# Patient Record
Sex: FEMALE | Race: WHITE | NOT HISPANIC OR LATINO | Employment: OTHER | URBAN - NONMETROPOLITAN AREA
[De-identification: names, ages, dates, MRNs, and addresses within clinical notes are randomized per-mention and may not be internally consistent; named-entity substitution may affect disease eponyms.]

---

## 2017-05-18 ENCOUNTER — OFFICE VISIT (OUTPATIENT)
Dept: ORTHOPEDIC SURGERY | Facility: CLINIC | Age: 72
End: 2017-05-18

## 2017-05-18 DIAGNOSIS — S43.005A SHOULDER JOINT DISLOCATION, LEFT, INITIAL ENCOUNTER: Primary | ICD-10-CM

## 2017-05-18 PROCEDURE — 99213 OFFICE O/P EST LOW 20 MIN: CPT | Performed by: ORTHOPAEDIC SURGERY

## 2017-05-23 ENCOUNTER — OFFICE VISIT (OUTPATIENT)
Dept: ORTHOPEDIC SURGERY | Facility: CLINIC | Age: 72
End: 2017-05-23

## 2017-05-23 DIAGNOSIS — S43.005D: Primary | ICD-10-CM

## 2017-05-23 PROBLEM — S43.006A SHOULDER JOINT DISLOCATION: Status: ACTIVE | Noted: 2017-05-23

## 2017-05-23 PROCEDURE — 99213 OFFICE O/P EST LOW 20 MIN: CPT | Performed by: ORTHOPAEDIC SURGERY

## 2017-06-15 ENCOUNTER — OFFICE VISIT (OUTPATIENT)
Dept: ORTHOPEDIC SURGERY | Facility: CLINIC | Age: 72
End: 2017-06-15

## 2017-06-15 DIAGNOSIS — S43.005D: Primary | ICD-10-CM

## 2017-06-15 PROCEDURE — 99213 OFFICE O/P EST LOW 20 MIN: CPT | Performed by: ORTHOPAEDIC SURGERY

## 2017-06-15 RX ORDER — LORAZEPAM 1 MG/1
1 TABLET ORAL NIGHTLY PRN
COMMUNITY
Start: 2016-03-25 | End: 2021-11-17

## 2017-06-15 RX ORDER — ATENOLOL 25 MG/1
31.25 TABLET ORAL DAILY
COMMUNITY
Start: 2017-05-14 | End: 2021-11-03 | Stop reason: SDUPTHER

## 2017-06-15 RX ORDER — ESCITALOPRAM OXALATE 10 MG/1
5 TABLET ORAL DAILY
COMMUNITY
Start: 2016-04-13 | End: 2021-07-17 | Stop reason: SDUPTHER

## 2017-06-15 RX ORDER — PILOCARPINE HYDROCHLORIDE 10 MG/ML
SOLUTION/ DROPS OPHTHALMIC
COMMUNITY
Start: 2017-05-02

## 2017-06-15 RX ORDER — ATENOLOL 50 MG/1
TABLET ORAL
COMMUNITY
Start: 2016-03-19 | End: 2021-08-03

## 2017-06-15 RX ORDER — BACITRACIN 500 [USP'U]/G
OINTMENT OPHTHALMIC
COMMUNITY
Start: 2016-09-22 | End: 2021-11-03

## 2017-06-15 NOTE — PROGRESS NOTES
Chief Complaint   Patient presents with   • Left Shoulder - Follow-up   Patient is here to follow up on a left shoulder dislocation that caused some tearing on May 15,2017.        HPI patient is following up on a shoulder dislocation which led to a massive rotator cuff tear.  She had an MRI which is consistent with a large tear of the supraspinatus.  She is very much set against surgery and does not want to have a surgical intervention at this point.  I certainly agree with conservative, nonoperative management for as long as she can tolerate her symptoms.  If however she starts to develop cuff tear arthropathy she does understand that she is going to need a shoulder joint replacement.  I have discussed that with the patient as well as her  in great detail.  She is making good progress with physical therapy and does not have any night or rest pain at this point.        There were no vitals filed for this visit.        Review of Systems   Constitutional: Negative for activity change, appetite change, fatigue, fever and unexpected weight change.   HENT: Negative for congestion, sneezing and voice change.    Eyes: Negative for visual disturbance.   Respiratory: Negative for cough, chest tightness and wheezing.    Cardiovascular: Negative for chest pain, palpitations and leg swelling.   Gastrointestinal: Negative for abdominal distention, constipation, diarrhea and vomiting.   Endocrine: Negative for polydipsia, polyphagia and polyuria.   Genitourinary: Negative for decreased urine volume, difficulty urinating, dysuria, flank pain and frequency.   Musculoskeletal: Negative for arthralgias, back pain, gait problem, joint swelling, myalgias, neck pain and neck stiffness.   Skin: Negative for color change, pallor and wound.   Allergic/Immunologic: Negative for environmental allergies and food allergies.   Neurological: Negative for dizziness, weakness and headaches.   Hematological: Does not bruise/bleed easily.    Psychiatric/Behavioral: Negative for agitation, confusion, decreased concentration and sleep disturbance. The patient is not nervous/anxious.            Physical Exam   Constitutional: She is oriented to person, place, and time. She appears well-developed and well-nourished.   HENT:   Head: Normocephalic.   Eyes: Conjunctivae are normal. Pupils are equal, round, and reactive to light.   Neck: Normal range of motion. Neck supple. No JVD present.   Cardiovascular: Normal rate, normal heart sounds and intact distal pulses.    Pulmonary/Chest: Effort normal and breath sounds normal. No respiratory distress.   Abdominal: Soft. Bowel sounds are normal. There is no tenderness. There is no guarding.   Lymphadenopathy:     She has no cervical adenopathy.   Neurological: She is alert and oriented to person, place, and time. She has normal reflexes.   Skin: Skin is warm and dry.   Psychiatric: Her behavior is normal. Judgment and thought content normal.   Vitals reviewed.          Joint/Body Part Specific Exam:    left shoulder. The shoulder is extremely tender anteriorly. There is tenderness over the insertion of the rotator cuff over the greater tuberosity of the humerus. Slight proximal migration of the humeral head is noted. AC joint is tender. Crossover adduction test is positive. Drop arm sign is positive. Forward flexion is 0-160° degrees, abduction is 0-165° degrees, external rotation is 0-30° degrees. Patient has mild atrophy both of the supra and infraspinatus fossa. Sulcus sign is negative. There is no evidence of multidirectional instability. Neer test is positive on compression. Skin and soft tissue tenderness is noted. Patient’s strength in abduction and external rotation are extremely lacking. The pain level is 2.    X-RAY Report:  No x-rays taken at this visit      Diagnostics:        Yanni was seen today for follow-up.    Diagnoses and all orders for this visit:    Shoulder joint dislocation, left,  subsequent encounter          Procedures        Plan:  Continue with outpatient physical therapy with stretching and strengthening exercises of the shoulder to prevent a frozen shoulder.    Redislocation precautions and avoid the provocative position for shoulder dislocation.    Tablet ibuprofen 600 mg tab 1 by mouth daily when necessary pain and discomfort.    Patient will eventually need a reverse total shoulder arthroplasty think that her shoulder function especially the rotator cuff function is in a recliner situation and will probably get worse as time goes by.    Follow-up in my office in 8 weeks care

## 2017-08-14 ENCOUNTER — OFFICE VISIT (OUTPATIENT)
Dept: ORTHOPEDIC SURGERY | Facility: CLINIC | Age: 72
End: 2017-08-14

## 2017-08-14 DIAGNOSIS — S43.005D: Primary | ICD-10-CM

## 2017-08-14 PROCEDURE — 99213 OFFICE O/P EST LOW 20 MIN: CPT | Performed by: ORTHOPAEDIC SURGERY

## 2017-08-14 NOTE — PROGRESS NOTES
Chief Complaint   Patient presents with   • Left Shoulder - Follow-up     CC: Follow up on her left shoulder dislocation with a massive rotator cuff tear      HPI the patient is here today for a follow up of her left shoulder. Patient states that she is doing quite well at this point.  The pain, swelling and bruising is settled down nicely.  Physical therapy has helped her immensely.  She has regained her near normal range of motion.  She has some discomfort in forward flexion and abduction but for the most part she is not taking any medication to control the symptoms.  Her symptoms are very tolerable.  At this point the patient is very convinced that she does not want to consider any form of surgical intervention.  She does understand the pathology of a shoulder dislocation that can lead to a massive rotator cuff tear and eventual cuff tear arthropathy         There were no vitals filed for this visit.        Review of Systems   Constitutional: Negative.    HENT: Negative.    Eyes: Negative.    Respiratory: Negative.    Cardiovascular: Negative.    Gastrointestinal: Negative.    Endocrine: Negative.    Genitourinary: Negative.    Musculoskeletal: Negative.    Skin: Negative.    Allergic/Immunologic: Negative.    Neurological: Negative.    Hematological: Negative.    Psychiatric/Behavioral: Negative.            Physical Exam   Constitutional: She is oriented to person, place, and time. She appears well-nourished.   HENT:   Head: Atraumatic.   Eyes: EOM are normal.   Neck: Neck supple.   Cardiovascular: Normal rate and intact distal pulses.    Pulmonary/Chest: Breath sounds normal.   Abdominal: Soft. Bowel sounds are normal.   Musculoskeletal: Normal range of motion. She exhibits edema and tenderness. She exhibits no deformity.   Neurological: She is alert and oriented to person, place, and time. She has normal reflexes.   Skin: Skin is dry.   Psychiatric: She has a normal mood and affect. Her behavior is normal.  Judgment and thought content normal.   Nursing note and vitals reviewed.          Joint/Body Part Specific Exam:  left shoulder. Rotator cuff function is somewhat impaired. There is a high riding humeral head with articulation of the humerus to the undersurface of the acromiohumeral articulation. AC joint is exquisitely tender and painful for patient. Axillary nerve function is well preserved. There is significant winging of the scapula with hollowing of the fossae over the proximal and distal aspects of the spine of the scapula. Forward flexion is 0-130 degrees, active abduction is 0-130 degrees. Drop arm sign is positive. External rotation against resistance is extremely painful and limited for the patient. Skin and soft tissues are essentially normal other than some amounts of swelling. The pain level is 5.  Apprehension sign is positive although she has had no further episodes of shoulder dislocation since the index injury.        X-RAY Report:        Diagnostics:        Yanni was seen today for follow-up.    Diagnoses and all orders for this visit:    Shoulder joint dislocation, left, subsequent encounter          Procedures        Plan:  Continue with a home based exercise program with stretching and strengthening exercises.    Re-dislocation precautions.    Continue with outpatient physical therapy 1-2 times a week.    Tablet ibuprofen 600 mg tab 1 by mouth twice a day p.m. pain and discomfort.    Intra-articular steroid injection discussed and offered to the patient if needed for control of pain and symptoms on a temporary basis.    Eventually this patient is going to need a reverse total shoulder arthroplasty in the future as the arthritis progresses and atrophy of the rotator cuff tear continues to bother her.    Calcium and vitamin D for bone health.    Follow-up in my office in 3-4 months for reevaluation and repeat x-rays.

## 2017-11-16 ENCOUNTER — OFFICE VISIT (OUTPATIENT)
Dept: ORTHOPEDIC SURGERY | Facility: CLINIC | Age: 72
End: 2017-11-16

## 2017-11-16 DIAGNOSIS — S43.005D DISLOCATION OF LEFT SHOULDER JOINT, SUBSEQUENT ENCOUNTER: ICD-10-CM

## 2017-11-16 DIAGNOSIS — M25.522 LEFT ELBOW PAIN: Primary | ICD-10-CM

## 2017-11-16 DIAGNOSIS — S43.005S SHOULDER DISLOCATION, LEFT, SEQUELA: ICD-10-CM

## 2017-11-16 PROCEDURE — 99213 OFFICE O/P EST LOW 20 MIN: CPT | Performed by: ORTHOPAEDIC SURGERY

## 2017-11-16 PROCEDURE — 73030 X-RAY EXAM OF SHOULDER: CPT | Performed by: ORTHOPAEDIC SURGERY

## 2017-11-16 PROCEDURE — 73070 X-RAY EXAM OF ELBOW: CPT | Performed by: ORTHOPAEDIC SURGERY

## 2017-11-16 NOTE — PROGRESS NOTES
Chief Complaint   Patient presents with   • Left Shoulder - Follow-up   • Left Elbow - Follow-up           HPI the patient is here today for a follow up of her left shoulder and left elbow.Patient sustained an injury to the left upper extremity when she fell and dislocated the glenohumeral joint.  A closed reduction is performed at the emergency room.  This has been followed by conservative, nonoperative care.  An MRI has been obtained which revealed a massive tear of the rotator cuff involving the supra and infraspinatus tendons.  She has been adamant in not wanting to undergo surgery and therefore has been working hard with physical therapy.  She has recovered range of motion to near normal levels of the shoulder.  She is very functional at this point.  There have been no further episodes of dislocation.  The patient states that she would like to as long as possible without surgical intervention.  The left elbow continues to bother her on the medial aspect of the elbow.  She has difficulty with full extension.  She finds it difficult to lift heavy weight with the left upper extremity because of the elbow pain.  We have discussed the pathology of medial epicondylar injury patient and have recommended conservative, nonoperative care.    There were no vitals filed for this visit.    Review of Systems   Constitutional: Negative.    HENT: Negative.    Eyes: Negative.    Respiratory: Negative.    Cardiovascular: Negative.    Gastrointestinal: Negative.    Endocrine: Negative.    Genitourinary: Negative.    Musculoskeletal: Negative.    Skin: Negative.    Allergic/Immunologic: Negative.    Neurological: Negative.    Hematological: Negative.    Psychiatric/Behavioral: Negative.            Physical Exam   Constitutional: She is oriented to person, place, and time. She appears well-nourished.   HENT:   Head: Atraumatic.   Eyes: EOM are normal.   Neck: Neck supple.   Cardiovascular: Normal rate and intact distal pulses.     Pulmonary/Chest: Breath sounds normal.   Abdominal: Bowel sounds are normal.   Musculoskeletal: She exhibits edema and tenderness. She exhibits no deformity.   Neurological: She is alert and oriented to person, place, and time. She has normal reflexes.   Skin: Skin is dry.   Psychiatric: She has a normal mood and affect. Her behavior is normal. Judgment and thought content normal.   Nursing note and vitals reviewed.          Joint/Body Part Specific Exam:  left elbow. medial Relationship of 3 bony points is well preserved. There is no evidence of posterior interosseous nerve palsy. Mild effusion is noted of the elbow. There is no ulnar neuritis. Patient has a lot of pain and tenderness over the lateral/medial aspect of the elbow. Range of motion of the elbow is 0-130 degrees of flexion, 0-90 degrees of pronation, 0-90 degrees of supination. Extension of the wrist against resistance bothers the patient significantly. Radial artery pulses are palpable. Skin and soft tissues are slightly swollen. There is point tenderness over the flexor pronator muscle mass/extensor supinator muscle mass.    left shoulder. Rotator cuff function is extremely impaired. There is a high riding humeral head with articulation of the humerus to the undersurface of the acromiohumeral articulation. AC joint is exquisitely tender and painful for patient. Axillary nerve function is well preserved. There is significant winging of the scapula with hollowing of the fossae over the proximal and distal aspects of the spine of the scapula. Forward flexion is 0-130 degrees, active abduction is 0-150 degrees. Drop arm sign is positive. External rotation against resistance is extremely painful and limited for the patient. Skin and soft tissues are essentially normal other than some amounts of swelling. The pain level is 3    X-RAY Report:  left Elbow X-Ray  Indication: Pain on full extension of the elbow  Views: AP and Lateral views  Findings: Fairly  normal contour of the bony architecture of the elbow   no bony lesion  Soft tissues within normal limits  within normal limits joint spaces  Hardware appropriately positioned not applicable      no prior studies available for comparison.    X-RAY was ordered and reviewed by Darshan Dela Cruz MD        left Shoulder X-Ray  Indication: Evaluation of the shoulder joint reduction status post closed reduction of a dislocation  AP and Lateral  Findings: Anatomically reduced joint with slight proximal migration of the humeral head.  no bony lesion  Soft tissues within normal limits  decreased joint spaces  Hardware appropriately positioned not applicable      yes prior studies available for comparison.    X-RAY was ordered and reviewed by Darshan Dela Cruz MD          Diagnostics:        Yanni was seen today for follow-up and follow-up.    Diagnoses and all orders for this visit:    Left elbow pain  -     XR Elbow 2 View Left    Shoulder dislocation, left, sequela  -     XR Shoulder 2+ View Left    Dislocation of left shoulder joint, subsequent encounter          Procedures        Plan:  Continue with a home based exercise program with stretching and strengthening exercises of the rotator cuff musculature.    Tablet ibuprofen 600 mg orally twice a day for pain and discomfort.    Redislocation precautions.    Topical application of Voltaren Gel to act as an anti-inflammatory salve to help decrease the swelling and inflammation around the elbow and the shoulder joint as well.    Intra-articular injection to the medial epicondylar tendons as well as to the shoulder discussed with the patient.    Eventually she will need a reverse total shoulder arthroplasty but the patient will like to proceed with conservative, nonoperative care for as long as possible.    Follow-up in my office in 3 months.

## 2018-02-22 ENCOUNTER — OFFICE VISIT (OUTPATIENT)
Dept: ORTHOPEDIC SURGERY | Facility: CLINIC | Age: 73
End: 2018-02-22

## 2018-02-22 DIAGNOSIS — M25.522 LEFT ELBOW PAIN: ICD-10-CM

## 2018-02-22 DIAGNOSIS — G56.22 ULNAR NEURITIS, LEFT: ICD-10-CM

## 2018-02-22 DIAGNOSIS — S43.005D DISLOCATION OF LEFT SHOULDER JOINT, SUBSEQUENT ENCOUNTER: ICD-10-CM

## 2018-02-22 DIAGNOSIS — S43.005S: Primary | ICD-10-CM

## 2018-02-22 PROCEDURE — 73020 X-RAY EXAM OF SHOULDER: CPT | Performed by: ORTHOPAEDIC SURGERY

## 2018-02-22 PROCEDURE — 99213 OFFICE O/P EST LOW 20 MIN: CPT | Performed by: ORTHOPAEDIC SURGERY

## 2018-02-22 PROCEDURE — 73070 X-RAY EXAM OF ELBOW: CPT | Performed by: ORTHOPAEDIC SURGERY

## 2018-02-22 NOTE — PROGRESS NOTES
Chief Complaint   Patient presents with   • Left Shoulder - Follow-up   • Shoulder Injury     LEFT SHOULDER DISLOCATION   Patient is here today following up on a left shoulder dislocation.        HPI patient has done quite well in terms of return of range of motion of the shoulder following a dislocation.  The rotator cuff function is impaired because of the tear.  She is most likely going to advance into a situation with cuff tear arthropathy.  However at this point it is her elbow that is bothering her the most.  She has pain and discomfort which started during the physical therapy process.  The pain is over the medial aspect of the elbow.  Extension against resistance bothers the patient significantly.  She has pain along the ulnar nerve which radiates along the ulnar side forearm into the fourth and fifth digits.  She has some weakness in  strength as well.  There is no history of recurrent dislocation of the shoulder.  It is my impression that she injured the medial side of the elbow over the epicondylar tendons as well as the ulnar nerve which is now getting aggravated as she is actively mobilize in the shoulder and the upper extremity as a whole.  If her symptoms continue to bother her she might be a candidate for an EMG/nerve conduction study for evaluation of the ulnar nerve involvement.        There were no vitals filed for this visit.        Review of Systems   Constitutional: Negative.    HENT: Negative.    Eyes: Negative.    Respiratory: Negative.    Cardiovascular: Negative.    Gastrointestinal: Negative.    Endocrine: Negative.    Genitourinary: Negative.    Musculoskeletal: Positive for joint swelling.   Skin: Negative.    Allergic/Immunologic: Negative.    Neurological: Negative.    Hematological: Negative.    Psychiatric/Behavioral: Negative.            Physical Exam   Constitutional: She is oriented to person, place, and time. She appears well-nourished.   HENT:   Head: Atraumatic.   Eyes: EOM  are normal.   Neck: Neck supple.   Cardiovascular: Normal rate, regular rhythm, normal heart sounds and intact distal pulses.    Pulmonary/Chest: Breath sounds normal.   Abdominal: Bowel sounds are normal.   Musculoskeletal: She exhibits edema and tenderness. She exhibits no deformity.   Neurological: She is alert and oriented to person, place, and time. She has normal reflexes.   Skin: Skin is dry.   Psychiatric: She has a normal mood and affect. Her behavior is normal. Judgment and thought content normal.   Nursing note and vitals reviewed.          Joint/Body Part Specific Exam:  left shoulder. Rotator cuff function is extremely impaired. There is a high riding humeral head with articulation of the humerus to the undersurface of the acromiohumeral articulation. AC joint is exquisitely tender and painful for patient. Axillary nerve function is well preserved. There is significant winging of the scapula with hollowing of the fossae over the proximal and distal aspects of the spine of the scapula. Forward flexion is 0-130 degrees, active abduction is 0-150 degrees. Drop arm sign is positive. External rotation against resistance is extremely painful and limited for the patient. Skin and soft tissues are essentially normal other than some amounts of swelling. The pain level is 2.     left elbow. medial Relationship of 3 bony points is well preserved. There is no evidence of posterior interosseous nerve palsy. Mild effusion is noted of the elbow. There is no ulnar neuritis. Patient has a lot of pain and tenderness over the lateral/medial aspect of the elbow. Range of motion of the elbow is 0-130 degrees of flexion, 0-90 degrees of pronation, 0-90 degrees of supination. Extension of the wrist against resistance bothers the patient significantly. Radial artery pulses are palpable. Skin and soft tissues are slightly swollen. There is point tenderness over the flexor pronator muscle mass/extensor supinator muscle mass.    Relationship of 3 bony points of the elbow is well preserved. There is no subluxation of the ulnar nerve anteriorly. Range of motion is 0-130 degrees of flexion, 0-90 degrees of supination. There is no instability of the ulnar nerve. There is tenderness over the cubital tunnel in full flexion but no anterior subluxation of the nerve is noted. Distally there is subjective weakness of the interosseous muscle function and slightly prolonged moving 2 point discrimination of ulnar nerve territory. Tinel’s sign is positive over the posterior aspect of the medial epicondyle, over the course of the ulnar nerve, over the cubital tunnel region. Ulnar artery pulses are palpable. Skin and soft tissues are essentially normal.  Diagnostics:    X-Ray:  left Shoulder X-Ray  Indication: Pain after dislocation of the shoulder  AP and Lateral  Findings: Anatomically reduced glenohumeral joint with slight proximal migration of the humeral head  no bony lesion  Soft tissues within normal limits  within normal limits joint spaces  Hardware appropriately positioned not applicable      yes prior studies available for comparison.    X-RAY was ordered and reviewed by MD Kristina Boldens was seen today for shoulder injury and follow-up.    Diagnoses and all orders for this visit:    Shoulder joint dislocation, left, sequela  -     XR Shoulder 1 View Left            Procedures        Plan:  Catching and strengthening exercises of the shoulder to prevent a shoulder redislocation.    Avoid repetitive loading of the elbow to let the ulnar neuritis settled down.    Topical application of Pennsaid as an anti-inflammatory salve to help the inflammatory symptoms around the ulnar nerve.    Avoid repetitive loading and lifting off the upper extremity.    Follow-up in my office in 3 months for reevaluation.

## 2018-02-24 PROBLEM — S43.005S: Status: ACTIVE | Noted: 2018-02-24

## 2018-02-24 PROBLEM — G56.22 ULNAR NEURITIS, LEFT: Status: ACTIVE | Noted: 2018-02-24

## 2018-02-24 PROBLEM — M25.522 LEFT ELBOW PAIN: Status: ACTIVE | Noted: 2018-02-24

## 2018-02-28 ENCOUNTER — OFFICE VISIT CONVERTED (OUTPATIENT)
Dept: FAMILY MEDICINE CLINIC | Age: 73
End: 2018-02-28
Attending: FAMILY MEDICINE

## 2018-05-30 ENCOUNTER — OFFICE VISIT (OUTPATIENT)
Dept: ORTHOPEDIC SURGERY | Facility: CLINIC | Age: 73
End: 2018-05-30

## 2018-05-30 DIAGNOSIS — S43.005D DISLOCATION OF LEFT SHOULDER JOINT, SUBSEQUENT ENCOUNTER: ICD-10-CM

## 2018-05-30 DIAGNOSIS — G56.22 ULNAR NEURITIS, LEFT: ICD-10-CM

## 2018-05-30 DIAGNOSIS — M25.522 LEFT ELBOW PAIN: Primary | ICD-10-CM

## 2018-05-30 PROCEDURE — 20605 DRAIN/INJ JOINT/BURSA W/O US: CPT | Performed by: ORTHOPAEDIC SURGERY

## 2018-05-30 PROCEDURE — 99213 OFFICE O/P EST LOW 20 MIN: CPT | Performed by: ORTHOPAEDIC SURGERY

## 2018-05-30 RX ADMIN — METHYLPREDNISOLONE ACETATE 160 MG: 80 INJECTION, SUSPENSION INTRA-ARTICULAR; INTRALESIONAL; INTRAMUSCULAR; SOFT TISSUE at 14:19

## 2018-05-30 RX ADMIN — LIDOCAINE HYDROCHLORIDE 2 ML: 20 INJECTION, SOLUTION EPIDURAL; INFILTRATION; INTRACAUDAL; PERINEURAL at 14:19

## 2018-06-10 RX ORDER — METHYLPREDNISOLONE ACETATE 80 MG/ML
160 INJECTION, SUSPENSION INTRA-ARTICULAR; INTRALESIONAL; INTRAMUSCULAR; SOFT TISSUE
Status: COMPLETED | OUTPATIENT
Start: 2018-05-30 | End: 2018-05-30

## 2018-06-10 RX ORDER — LIDOCAINE HYDROCHLORIDE 20 MG/ML
2 INJECTION, SOLUTION EPIDURAL; INFILTRATION; INTRACAUDAL; PERINEURAL
Status: COMPLETED | OUTPATIENT
Start: 2018-05-30 | End: 2018-05-30

## 2018-06-26 ENCOUNTER — OFFICE VISIT CONVERTED (OUTPATIENT)
Dept: FAMILY MEDICINE CLINIC | Age: 73
End: 2018-06-26
Attending: FAMILY MEDICINE

## 2018-09-19 ENCOUNTER — OFFICE VISIT CONVERTED (OUTPATIENT)
Dept: FAMILY MEDICINE CLINIC | Age: 73
End: 2018-09-19
Attending: FAMILY MEDICINE

## 2018-09-24 ENCOUNTER — OFFICE VISIT CONVERTED (OUTPATIENT)
Dept: FAMILY MEDICINE CLINIC | Age: 73
End: 2018-09-24
Attending: NURSE PRACTITIONER

## 2018-12-19 ENCOUNTER — OFFICE VISIT CONVERTED (OUTPATIENT)
Dept: FAMILY MEDICINE CLINIC | Age: 73
End: 2018-12-19
Attending: FAMILY MEDICINE

## 2019-03-19 ENCOUNTER — OFFICE VISIT CONVERTED (OUTPATIENT)
Dept: FAMILY MEDICINE CLINIC | Age: 74
End: 2019-03-19
Attending: FAMILY MEDICINE

## 2019-03-20 ENCOUNTER — HOSPITAL ENCOUNTER (OUTPATIENT)
Dept: OTHER | Facility: HOSPITAL | Age: 74
Discharge: HOME | End: 2019-03-20
Attending: FAMILY MEDICINE

## 2019-03-20 LAB
ALBUMIN SERPL-MCNC: 4.3 G/DL (ref 3.5–5)
ALBUMIN/GLOB SERPL: 1.3 {RATIO} (ref 1.4–2.6)
ALP SERPL-CCNC: 86 U/L (ref 43–160)
ALT SERPL-CCNC: 18 U/L (ref 10–40)
ANION GAP SERPL CALC-SCNC: 14 MMOL/L (ref 8–19)
AST SERPL-CCNC: 17 U/L (ref 15–50)
BILIRUB SERPL-MCNC: 0.68 MG/DL (ref 0.2–1.3)
BUN SERPL-MCNC: 19 MG/DL (ref 5–25)
BUN/CREAT SERPL: 32 {RATIO} (ref 6–20)
CALCIUM SERPL-MCNC: 9.2 MG/DL (ref 8.7–10.4)
CHLORIDE SERPL-SCNC: 99 MMOL/L (ref 99–111)
CHOLEST SERPL-MCNC: 172 MG/DL (ref 107–200)
CHOLEST/HDLC SERPL: 3.7 {RATIO} (ref 3–6)
CONV CO2: 26 MMOL/L (ref 22–32)
CONV TOTAL PROTEIN: 7.6 G/DL (ref 6.3–8.2)
CREAT UR-MCNC: 0.6 MG/DL (ref 0.5–0.9)
GFR SERPLBLD BASED ON 1.73 SQ M-ARVRAT: >60 ML/MIN/{1.73_M2}
GLOBULIN UR ELPH-MCNC: 3.3 G/DL (ref 2–3.5)
GLUCOSE SERPL-MCNC: 121 MG/DL (ref 65–99)
HDLC SERPL-MCNC: 47 MG/DL (ref 40–60)
LDLC SERPL CALC-MCNC: 102 MG/DL (ref 70–100)
OSMOLALITY SERPL CALC.SUM OF ELEC: 284 MOSM/KG (ref 273–304)
POTASSIUM SERPL-SCNC: 4.4 MMOL/L (ref 3.5–5.3)
SODIUM SERPL-SCNC: 135 MMOL/L (ref 135–147)
TRIGL SERPL-MCNC: 116 MG/DL (ref 40–150)
VLDLC SERPL-MCNC: 23 MG/DL (ref 5–37)

## 2019-07-10 ENCOUNTER — OFFICE VISIT CONVERTED (OUTPATIENT)
Dept: FAMILY MEDICINE CLINIC | Age: 74
End: 2019-07-10
Attending: FAMILY MEDICINE

## 2019-09-11 ENCOUNTER — OFFICE VISIT CONVERTED (OUTPATIENT)
Dept: FAMILY MEDICINE CLINIC | Age: 74
End: 2019-09-11
Attending: NURSE PRACTITIONER

## 2019-10-10 ENCOUNTER — HOSPITAL ENCOUNTER (OUTPATIENT)
Dept: OTHER | Facility: HOSPITAL | Age: 74
Discharge: HOME | End: 2019-10-10
Attending: FAMILY MEDICINE

## 2019-10-10 ENCOUNTER — OFFICE VISIT CONVERTED (OUTPATIENT)
Dept: FAMILY MEDICINE CLINIC | Age: 74
End: 2019-10-10
Attending: FAMILY MEDICINE

## 2019-10-10 LAB
ALBUMIN SERPL-MCNC: 4.4 G/DL (ref 3.5–5)
ALBUMIN/GLOB SERPL: 1.4 {RATIO} (ref 1.4–2.6)
ALP SERPL-CCNC: 96 U/L (ref 43–160)
ALT SERPL-CCNC: 19 U/L (ref 10–40)
ANION GAP SERPL CALC-SCNC: 20 MMOL/L (ref 8–19)
APPEARANCE UR: CLEAR
AST SERPL-CCNC: 18 U/L (ref 15–50)
BACTERIA UR QL AUTO: NORMAL
BILIRUB SERPL-MCNC: 0.58 MG/DL (ref 0.2–1.3)
BILIRUB UR QL: NEGATIVE
BUN SERPL-MCNC: 18 MG/DL (ref 5–25)
BUN/CREAT SERPL: 29 {RATIO} (ref 6–20)
CALCIUM SERPL-MCNC: 9.9 MG/DL (ref 8.7–10.4)
CASTS URNS QL MICRO: NORMAL /[LPF]
CHLORIDE SERPL-SCNC: 100 MMOL/L (ref 99–111)
COLOR UR: YELLOW
CONV CO2: 23 MMOL/L (ref 22–32)
CONV LEUKOCYTE ESTERASE: NEGATIVE
CONV TOTAL PROTEIN: 7.6 G/DL (ref 6.3–8.2)
CONV UROBILINOGEN IN URINE BY AUTOMATED TEST STRIP: 0.2 {EHRLICHU}/DL (ref 0.1–1)
CREAT UR-MCNC: 0.63 MG/DL (ref 0.5–0.9)
EPI CELLS #/AREA URNS HPF: NORMAL /[HPF]
GFR SERPLBLD BASED ON 1.73 SQ M-ARVRAT: >60 ML/MIN/{1.73_M2}
GLOBULIN UR ELPH-MCNC: 3.2 G/DL (ref 2–3.5)
GLUCOSE 24H UR-MCNC: NEGATIVE MG/DL
GLUCOSE SERPL-MCNC: 107 MG/DL (ref 65–99)
HGB UR QL STRIP: NEGATIVE
KETONES UR QL STRIP: NEGATIVE MG/DL
MUCOUS THREADS URNS QL MICRO: NORMAL
NITRITE UR-MCNC: NEGATIVE MG/ML
OSMOLALITY SERPL CALC.SUM OF ELEC: 290 MOSM/KG (ref 273–304)
PH UR STRIP.AUTO: 5.5 [PH] (ref 5–8)
POTASSIUM SERPL-SCNC: 4.4 MMOL/L (ref 3.5–5.3)
PROT UR-MCNC: NEGATIVE MG/DL
RBC # BLD AUTO: NORMAL /[HPF]
SODIUM SERPL-SCNC: 139 MMOL/L (ref 135–147)
SP GR UR STRIP: 1.01 (ref 1–1.03)
SPECIMEN SOURCE: NORMAL
UNIDENT CRYS URNS QL MICRO: NORMAL /[HPF]
WBC #/AREA URNS HPF: NORMAL /[HPF]

## 2019-10-23 ENCOUNTER — OFFICE VISIT CONVERTED (OUTPATIENT)
Dept: FAMILY MEDICINE CLINIC | Age: 74
End: 2019-10-23
Attending: NURSE PRACTITIONER

## 2019-10-25 ENCOUNTER — HOSPITAL ENCOUNTER (OUTPATIENT)
Dept: OTHER | Facility: HOSPITAL | Age: 74
Discharge: HOME | End: 2019-10-25
Attending: NURSE PRACTITIONER

## 2019-11-21 ENCOUNTER — OFFICE VISIT CONVERTED (OUTPATIENT)
Dept: FAMILY MEDICINE CLINIC | Age: 74
End: 2019-11-21
Attending: NURSE PRACTITIONER

## 2019-11-21 ENCOUNTER — HOSPITAL ENCOUNTER (OUTPATIENT)
Dept: OTHER | Facility: HOSPITAL | Age: 74
Discharge: HOME | End: 2019-11-21
Attending: NURSE PRACTITIONER

## 2020-01-10 ENCOUNTER — OFFICE VISIT CONVERTED (OUTPATIENT)
Dept: FAMILY MEDICINE CLINIC | Age: 75
End: 2020-01-10
Attending: FAMILY MEDICINE

## 2020-01-10 ENCOUNTER — HOSPITAL ENCOUNTER (OUTPATIENT)
Dept: OTHER | Facility: HOSPITAL | Age: 75
Discharge: HOME | End: 2020-01-10
Attending: FAMILY MEDICINE

## 2020-01-10 LAB
ALBUMIN SERPL-MCNC: 4.4 G/DL (ref 3.5–5)
ALBUMIN/GLOB SERPL: 1.3 {RATIO} (ref 1.4–2.6)
ALP SERPL-CCNC: 92 U/L (ref 43–160)
ALT SERPL-CCNC: 17 U/L (ref 10–40)
ANION GAP SERPL CALC-SCNC: 15 MMOL/L (ref 8–19)
AST SERPL-CCNC: 16 U/L (ref 15–50)
BILIRUB SERPL-MCNC: 0.59 MG/DL (ref 0.2–1.3)
BUN SERPL-MCNC: 18 MG/DL (ref 5–25)
BUN/CREAT SERPL: 32 {RATIO} (ref 6–20)
CALCIUM SERPL-MCNC: 9.3 MG/DL (ref 8.7–10.4)
CHLORIDE SERPL-SCNC: 99 MMOL/L (ref 99–111)
CHOLEST SERPL-MCNC: 181 MG/DL (ref 107–200)
CHOLEST/HDLC SERPL: 3.5 {RATIO} (ref 3–6)
CONV CO2: 27 MMOL/L (ref 22–32)
CONV TOTAL PROTEIN: 7.9 G/DL (ref 6.3–8.2)
CREAT UR-MCNC: 0.56 MG/DL (ref 0.5–0.9)
GFR SERPLBLD BASED ON 1.73 SQ M-ARVRAT: >60 ML/MIN/{1.73_M2}
GLOBULIN UR ELPH-MCNC: 3.5 G/DL (ref 2–3.5)
GLUCOSE SERPL-MCNC: 99 MG/DL (ref 65–99)
HDLC SERPL-MCNC: 52 MG/DL (ref 40–60)
LDLC SERPL CALC-MCNC: 103 MG/DL (ref 70–100)
OSMOLALITY SERPL CALC.SUM OF ELEC: 286 MOSM/KG (ref 273–304)
POTASSIUM SERPL-SCNC: 4.1 MMOL/L (ref 3.5–5.3)
SODIUM SERPL-SCNC: 137 MMOL/L (ref 135–147)
TRIGL SERPL-MCNC: 131 MG/DL (ref 40–150)
VLDLC SERPL-MCNC: 26 MG/DL (ref 5–37)

## 2020-04-03 ENCOUNTER — OFFICE VISIT CONVERTED (OUTPATIENT)
Dept: FAMILY MEDICINE CLINIC | Age: 75
End: 2020-04-03
Attending: FAMILY MEDICINE

## 2020-04-15 ENCOUNTER — OFFICE VISIT CONVERTED (OUTPATIENT)
Dept: PULMONOLOGY | Facility: CLINIC | Age: 75
End: 2020-04-15
Attending: INTERNAL MEDICINE

## 2020-07-07 ENCOUNTER — OFFICE VISIT CONVERTED (OUTPATIENT)
Dept: FAMILY MEDICINE CLINIC | Age: 75
End: 2020-07-07
Attending: FAMILY MEDICINE

## 2020-07-15 ENCOUNTER — HOSPITAL ENCOUNTER (OUTPATIENT)
Dept: OTHER | Facility: HOSPITAL | Age: 75
Discharge: HOME | End: 2020-07-15
Attending: FAMILY MEDICINE

## 2020-07-23 ENCOUNTER — OFFICE VISIT (OUTPATIENT)
Dept: ORTHOPEDIC SURGERY | Facility: CLINIC | Age: 75
End: 2020-07-23

## 2020-07-23 VITALS — BODY MASS INDEX: 36.5 KG/M2 | WEIGHT: 206 LBS | HEIGHT: 63 IN | TEMPERATURE: 97.6 F

## 2020-07-23 DIAGNOSIS — S89.91XA RIGHT KNEE INJURY, INITIAL ENCOUNTER: ICD-10-CM
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
## 2020-07-23 PROCEDURE — 99203 OFFICE O/P NEW LOW 30 MIN: CPT | Performed by: ORTHOPAEDIC SURGERY

## 2020-09-29 ENCOUNTER — OFFICE VISIT CONVERTED (OUTPATIENT)
Dept: FAMILY MEDICINE CLINIC | Age: 75
End: 2020-09-29
Attending: FAMILY MEDICINE

## 2020-09-29 ENCOUNTER — HOSPITAL ENCOUNTER (OUTPATIENT)
Dept: OTHER | Facility: HOSPITAL | Age: 75
Discharge: HOME | End: 2020-09-29
Attending: FAMILY MEDICINE

## 2020-09-30 LAB
ALBUMIN SERPL-MCNC: 4.5 G/DL (ref 3.5–5)
ALBUMIN/GLOB SERPL: 1.5 {RATIO} (ref 1.4–2.6)
ALP SERPL-CCNC: 84 U/L (ref 43–160)
ALT SERPL-CCNC: 18 U/L (ref 10–40)
ANION GAP SERPL CALC-SCNC: 16 MMOL/L (ref 8–19)
AST SERPL-CCNC: 18 U/L (ref 15–50)
BILIRUB SERPL-MCNC: 0.66 MG/DL (ref 0.2–1.3)
BUN SERPL-MCNC: 21 MG/DL (ref 5–25)
BUN/CREAT SERPL: 32 {RATIO} (ref 6–20)
CALCIUM SERPL-MCNC: 9.6 MG/DL (ref 8.7–10.4)
CHLORIDE SERPL-SCNC: 100 MMOL/L (ref 99–111)
CHOLEST SERPL-MCNC: 188 MG/DL (ref 107–200)
CHOLEST/HDLC SERPL: 4.2 {RATIO} (ref 3–6)
CONV CO2: 26 MMOL/L (ref 22–32)
CONV TOTAL PROTEIN: 7.6 G/DL (ref 6.3–8.2)
CREAT UR-MCNC: 0.65 MG/DL (ref 0.5–0.9)
GFR SERPLBLD BASED ON 1.73 SQ M-ARVRAT: >60 ML/MIN/{1.73_M2}
GLOBULIN UR ELPH-MCNC: 3.1 G/DL (ref 2–3.5)
GLUCOSE SERPL-MCNC: 112 MG/DL (ref 65–99)
HDLC SERPL-MCNC: 45 MG/DL (ref 40–60)
LDLC SERPL CALC-MCNC: 103 MG/DL (ref 70–100)
OSMOLALITY SERPL CALC.SUM OF ELEC: 288 MOSM/KG (ref 273–304)
POTASSIUM SERPL-SCNC: 4.6 MMOL/L (ref 3.5–5.3)
SODIUM SERPL-SCNC: 137 MMOL/L (ref 135–147)
TRIGL SERPL-MCNC: 199 MG/DL (ref 40–150)
VLDLC SERPL-MCNC: 40 MG/DL (ref 5–37)

## 2020-11-10 ENCOUNTER — HOSPITAL ENCOUNTER (OUTPATIENT)
Dept: OTHER | Facility: HOSPITAL | Age: 75
Discharge: HOME | End: 2020-11-10
Attending: PEDIATRICS

## 2020-11-10 ENCOUNTER — OFFICE VISIT CONVERTED (OUTPATIENT)
Dept: FAMILY MEDICINE CLINIC | Age: 75
End: 2020-11-10

## 2020-11-13 LAB — SARS-COV-2 RNA SPEC QL NAA+PROBE: DETECTED

## 2021-01-21 ENCOUNTER — OFFICE VISIT (OUTPATIENT)
Dept: ORTHOPEDIC SURGERY | Facility: CLINIC | Age: 76
End: 2021-01-21

## 2021-01-21 VITALS — HEIGHT: 63 IN | TEMPERATURE: 97 F | BODY MASS INDEX: 35.79 KG/M2 | WEIGHT: 202 LBS

## 2021-01-21 DIAGNOSIS — M17.11 PRIMARY OSTEOARTHRITIS OF RIGHT KNEE: ICD-10-CM

## 2021-01-21 DIAGNOSIS — S43.006A: ICD-10-CM

## 2021-01-21 DIAGNOSIS — G56.22 ULNAR NEURITIS, LEFT: Primary | ICD-10-CM

## 2021-01-21 PROCEDURE — 99213 OFFICE O/P EST LOW 20 MIN: CPT | Performed by: ORTHOPAEDIC SURGERY

## 2021-01-21 RX ORDER — CEPHALEXIN 500 MG/1
CAPSULE ORAL
COMMUNITY
Start: 2021-01-07 | End: 2021-08-03

## 2021-01-26 ENCOUNTER — HOSPITAL ENCOUNTER (OUTPATIENT)
Dept: OTHER | Facility: HOSPITAL | Age: 76
Discharge: HOME | End: 2021-01-26
Attending: FAMILY MEDICINE

## 2021-01-26 ENCOUNTER — OFFICE VISIT CONVERTED (OUTPATIENT)
Dept: FAMILY MEDICINE CLINIC | Age: 76
End: 2021-01-26
Attending: FAMILY MEDICINE

## 2021-01-26 LAB
APPEARANCE UR: CLEAR
BACTERIA UR QL AUTO: NORMAL
BILIRUB UR QL: NEGATIVE
CASTS URNS QL MICRO: NORMAL /[LPF]
COLOR UR: YELLOW
CONV LEUKOCYTE ESTERASE: NEGATIVE
CONV UROBILINOGEN IN URINE BY AUTOMATED TEST STRIP: 0.2 {EHRLICHU}/DL (ref 0.1–1)
EPI CELLS #/AREA URNS HPF: NORMAL /[HPF]
GLUCOSE 24H UR-MCNC: NEGATIVE MG/DL
HGB UR QL STRIP: NEGATIVE
KETONES UR QL STRIP: NEGATIVE MG/DL
MUCOUS THREADS URNS QL MICRO: NORMAL
NITRITE UR-MCNC: NEGATIVE MG/ML
PH UR STRIP.AUTO: 6.5 [PH] (ref 5–8)
PROT UR-MCNC: NEGATIVE MG/DL
RBC # BLD AUTO: NORMAL /[HPF]
SP GR UR STRIP: 1.02 (ref 1–1.03)
SPECIMEN SOURCE: NORMAL
UNIDENT CRYS URNS QL MICRO: NORMAL /[HPF]
WBC #/AREA URNS HPF: NORMAL /[HPF]

## 2021-02-03 PROBLEM — M17.11 PRIMARY OSTEOARTHRITIS OF RIGHT KNEE: Status: ACTIVE | Noted: 2021-02-03

## 2021-02-26 ENCOUNTER — OFFICE VISIT CONVERTED (OUTPATIENT)
Dept: FAMILY MEDICINE CLINIC | Age: 76
End: 2021-02-26
Attending: FAMILY MEDICINE

## 2021-03-01 ENCOUNTER — HOSPITAL ENCOUNTER (OUTPATIENT)
Dept: OTHER | Facility: HOSPITAL | Age: 76
Discharge: HOME | End: 2021-03-01
Attending: FAMILY MEDICINE

## 2021-03-01 LAB
ALBUMIN SERPL-MCNC: 4.3 G/DL (ref 3.5–5)
ALBUMIN/GLOB SERPL: 1.3 {RATIO} (ref 1.4–2.6)
ALP SERPL-CCNC: 91 U/L (ref 43–160)
ALT SERPL-CCNC: 16 U/L (ref 10–40)
ANION GAP SERPL CALC-SCNC: 20 MMOL/L (ref 8–19)
AST SERPL-CCNC: 20 U/L (ref 15–50)
BILIRUB SERPL-MCNC: 0.74 MG/DL (ref 0.2–1.3)
BUN SERPL-MCNC: 22 MG/DL (ref 5–25)
BUN/CREAT SERPL: 32 {RATIO} (ref 6–20)
CALCIUM SERPL-MCNC: 9.4 MG/DL (ref 8.7–10.4)
CHLORIDE SERPL-SCNC: 103 MMOL/L (ref 99–111)
CHOLEST SERPL-MCNC: 185 MG/DL (ref 107–200)
CHOLEST/HDLC SERPL: 3.4 {RATIO} (ref 3–6)
CONV CO2: 23 MMOL/L (ref 22–32)
CONV TOTAL PROTEIN: 7.6 G/DL (ref 6.3–8.2)
CREAT UR-MCNC: 0.69 MG/DL (ref 0.5–0.9)
GFR SERPLBLD BASED ON 1.73 SQ M-ARVRAT: >60 ML/MIN/{1.73_M2}
GLOBULIN UR ELPH-MCNC: 3.3 G/DL (ref 2–3.5)
GLUCOSE SERPL-MCNC: 127 MG/DL (ref 65–99)
HDLC SERPL-MCNC: 55 MG/DL (ref 40–60)
LDLC SERPL CALC-MCNC: 109 MG/DL (ref 70–100)
OSMOLALITY SERPL CALC.SUM OF ELEC: 297 MOSM/KG (ref 273–304)
POTASSIUM SERPL-SCNC: 4.5 MMOL/L (ref 3.5–5.3)
SODIUM SERPL-SCNC: 141 MMOL/L (ref 135–147)
TRIGL SERPL-MCNC: 106 MG/DL (ref 40–150)
VLDLC SERPL-MCNC: 21 MG/DL (ref 5–37)

## 2021-04-15 ENCOUNTER — OFFICE VISIT CONVERTED (OUTPATIENT)
Dept: FAMILY MEDICINE CLINIC | Age: 76
End: 2021-04-15
Attending: FAMILY MEDICINE
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
## 2021-05-18 NOTE — PROGRESS NOTES
"Yanni Huynh  1945     Office/Outpatient Visit    Visit Date: Fri, Apr 3, 2020 03:44 pm    Provider: Maria E Melgar MD (Assistant: Vannessa Mazariegos RN)    Location: [facility]        Electronically signed by Maria E Melgar MD on  04/03/2020 04:26:10 PM                             Subjective:        CC: Mrs. Huynh is a 74 year old White female.  3 month follow up;         HPI: Yanni's telehealth visit today is for follow-up on chronic issues.        She is on escitalopram for anxiety which has been very helpful.  No anxiety or panic attacks.  Mood is \"good.\"  No adverse effects.  She is on lorazepam and melatonin both for insomnia.  She has been on the lorazepam for years now.  She saw Dr. Demetrius Romero PhD in 2015 who did work with her to significantly decrease the amount of medication she was using, but she was unable to get off of it altogether.  She has tried trazodone and Ambien in the past but had intolerable side effects with both.        She is on atenolol for HTN.  BP has been well controlled.  No CP, palpitations, or SOB.         She is on ibuprofen and topical diclofenac for L shoulder pain from a torn rotator cuff.  She follows with Dr. Dela Cruz.    ROS:     CONSTITUTIONAL:  Negative for fatigue and fever.      EYES:  Negative for blurred vision.      E/N/T:  Negative for nasal congestion and frequent rhinorrhea.      CARDIOVASCULAR:  Positive for pedal edema.   Negative for chest pain or palpitations.      RESPIRATORY:  Positive for chronic cough.   Negative for recent cough or dyspnea.      GASTROINTESTINAL:  Negative for abdominal pain, constipation, diarrhea, nausea and vomiting.      MUSCULOSKELETAL:  Positive for arthralgias and (bilateral shoulders) limb pain ( right leg pain ).   Negative for myalgias.      PSYCHIATRIC:  Negative for anxiety, depression and sleep disturbance.          Past Medical History / Family History / Social History:         Last Reviewed on 4/03/2020 " 04:14 PM by Maria E Melgar    Past Medical History:                 PAST MEDICAL HISTORY         Hypertension     keratoconus s/p 3 cornea transplants         PREVENTIVE HEALTH MAINTENANCE             BONE DENSITY: was last done 6/28/2018 with the following abnormality noted-- osteopenia     COLORECTAL CANCER SCREENING: Up to date (colonoscopy q10y; sigmoidoscopy q5y; Cologuard q3y) was last done 11/13/17, Results are in chart; colonoscopy with normal results; The next colonoscopy is due  11/2027; diverticulosis and hemorrhoids     EYE EXAM: was last done 03/2019 Dr Eng     MAMMOGRAM: Done within last 2 years and results in are chart was last done 5/1/2018 with normal results     PAP SMEAR: No longer indicated due to age and history hysterectomy         PAST MEDICAL HISTORY             CURRENT MEDICAL PROVIDERS:    Dentist: Dr lange    General Surgeon: Apryl Denton    Ophthalmologist: Dr. Gallardo/Dr. Aguiar         PAST MEDICAL HISTORY                 ADVANCED DIRECTIVES: None         Surgical History:         Cholecystectomy     Hernia Repair: ventral hernia;     Hysterectomy: total, d/t endometriosis;     cornea transplant x 3;    colectomy (14 in) d/t diverticulosis;    R ankle ORIF;         Family History:     Father: Lung Cancer ( dec at age 54 )     Mother: Coronary Artery Disease         Social History:     Occupation: Homemaker    . Retired     Marital Status:      Children: 1 child         Tobacco/Alcohol/Supplements:     Last Reviewed on 4/03/2020 04:14 PM by Maria E Melgar    Tobacco: She has never smoked.  Non-drinker     Caffeine:  She admits to consuming caffeine via tea ( -occassionally, not often ).          Substance Abuse History:     Last Reviewed on 4/03/2020 04:14 PM by Maria E Melgar        Mental Health History:     Last Reviewed on 4/03/2020 04:14 PM by Maria E Melgar        Communicable Diseases (eg STDs):     Last Reviewed on 4/03/2020 04:14 PM by Maria E Melgar         Current Problems:     Last Reviewed on 1/10/2020 03:11 PM by Maria E Melgar    Anxiety disorder, unspecified    Primary insomnia    Essential (primary) hypertension    Sprain of left rotator cuff capsule, subsequent encounter    Other long term (current) drug therapy    History of falling    Low back pain    Pain in right knee    Contusion of right knee    Encounter for screening for depression    Allergic rhinitis, unspecified    Laryngeal spasm        Immunizations:     None        Allergies:     Last Reviewed on 4/03/2020 03:45 PM by Vannessa Mazariegos    No Known Allergies.        Current Medications:     Last Reviewed on 4/03/2020 03:45 PM by Vannessa Mazariegos    Cortisol manager supplement one daily     AREDS eye supplement daily     Melatonin 3 mg oral tablet [ 1 po qhs]    Calcium 500mg/Vitamin D 400IU supplement PRN     Ibuprofen 200 mg oral tablet [2 tab bid PRN]    Pilocarpine HCl 1 % ophthalmic (eye) Drops [Instill one drop in left eye at HS]    Escitalopram Oxalate 10 mg oral tablet [1/2 tab daily]    atenolol 25 mg oral tablet [TAKE ONE TABLET BY MOUTH EVERY MORNING AND THEN TAKE 1/4 TABLET AT NIGHT]    LORazepam 1 mg oral tablet [TAKE 1 TABLET BY MOUTH ONCE DAILY AT BEDTIME AS NEEDED]    Diclofenac Sodium 75 mg oral tablet, delayed release (enteric coated) [1 tab bid with food PRN]    Fluorometholone 0.1 % ophthalmic (eye) Drops, Suspension [instill 1 drop each eye QD]    fluticasone propionate 50 mcg/actuation Intranasal Spray, Suspension [inhale 1 spray (50 mcg) in each nostril by intranasal route once daily]        Assessment:         F41.9   Anxiety disorder, unspecified       Z79.899   Other long term (current) drug therapy       F51.01   Primary insomnia       I10   Essential (primary) hypertension           ORDERS:         Lab Orders:       APPTO  Appointment need  (In-House)                      Plan:         Anxiety disorder, unspecifiedStable on current regimen.  Anxiety well controlled.  No  adverse effects.  She does require ongoing use of this controlled substance to function.  Tox screen and Kobi run today.  No refills needed.  RTC 3 months.    Telehealth: Verbal consent obtained for visit to occur via phone call; Staff, other than provider, present during telephone visit include Vannessa Mazariegos RN; Total time spent was 9 minutes; 43126--Pyituptln E/M 5-10 minutes     FOLLOW-UP: Schedule a follow-up visit in 3 months.:.  f/u anxiety with [place]          Orders:       APPTO  Appointment need  (In-House)              Other long term (current) drug therapy    Controlled substance documentation: Kobi reviewed; prior drug screen consistent; consent is reviewed and signed and on the chart.  She is aware of risk of addiction on this medication, understands that she will need to follow up for a review every 3 months and her medications will be adjusted or decreased as deemed appropriate at each visit.  No history of drug or alcohol abuse.  No concerns about diversion or abuse. She denies side effects related to the medication.  She is aware that she may be called in for pill counts.  The dosing of this medication will be reviewed on a regular basis and reduced if possible..  Ongoing use of a controlled substance is necessary for this patient to have a normal quality of life         Primary insomniaStable.  Controlled substance monitoring as above.  No refills needed.        Essential (primary) hypertensionStable.  No refills needed.            Patient Recommendations:        For  Anxiety disorder, unspecified:    Schedule a follow-up visit in 3 months.                APPOINTMENT INFORMATION:        Monday Tuesday Wednesday Thursday Friday Saturday Sunday            Time:___________________AM  PM   Date:_____________________             Charge Capture:         Primary Diagnosis:     F41.9  Anxiety disorder, unspecified           Orders:      APPTO  Appointment need  (In-House)            10167   Phys/QHP telephone evaluation 5-10 min  (In-House)              Z79.899  Other long term (current) drug therapy     F51.01  Primary insomnia     I10  Essential (primary) hypertension
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
## 2021-05-18 NOTE — PROGRESS NOTES
"Yanni Huynh 1945     Office/Outpatient Visit    Visit Date: Wed, Dec 19, 2018 02:36 pm    Provider: Maria E Melgar MD (Assistant: Brittney Tavares MA)    Location: [facility]        Electronically signed by Maria E Melgar MD on  12/19/2018 03:36:29 PM                             SUBJECTIVE:        CC:     Mrs. Huynh is a 73 year old White female.  This is a follow-up visit.  Patient presents today for three month follow up, also has complaints of left ear pain and coughing X 2 months;         HPI: Yanni is here today for routine f/u of chronic issues.        She has had pain in the L ear and a cough for the past 2 months.  The ear pain is intermittent.  It is an ache.  She hasn't had her ears cleaned out in a while.  +Congestion and rhinorrhea, only on the L side.  She does have a history of deviated septum.  No sore throat.  She does have some hoarseness.  She has had some wheezing lately. She has a history of asthma in childhood.  No CP or SOB.  No abd pain, N/V/D.          She is on escitalopram for anxiety.  Her anxiety has been well controlled.  No anxiety or panic attacks.  Mood is \"good.\"  No adverse effects.  She is on lorazepam and melatonin both for insomnia.  She has been stable on these meds for years, especially the lorazepam.  She saw Dr. Demetirus Romero PhD in 2015 who did work with her to significantly decrease the amount of medication she was using.  She has tried trazodone and Ambien in the past but had intolerable side effects with both of these.        She is on atenolol for HTN.  BP has been well controlled.  No CP, palpitations, or SOB.         She is on ibuprofen and topical diclofenac for torn rotator cuff L shoulder.  She follows with Dr. Dela Cruz.     ROS:     CONSTITUTIONAL:  Negative for fatigue and fever.      EYES:  Negative for blurred vision.      E/N/T:  Positive for nasal congestion and frequent rhinorrhea.      CARDIOVASCULAR:  Negative for chest pain and " palpitations.      RESPIRATORY:  Positive for chronic cough.   Negative for recent cough or dyspnea.      GASTROINTESTINAL:  Negative for abdominal pain, constipation, diarrhea, nausea and vomiting.      MUSCULOSKELETAL:  Positive for arthralgias.   Negative for myalgias.      PSYCHIATRIC:  Negative for anxiety, depression and sleep disturbance.          PMH/FMH/SH:     Last Reviewed on 12/19/2018 02:56 PM by Maria E Melgar    Past Medical History:                 PAST MEDICAL HISTORY         Hypertension     keratoconus s/p 3 cornea transplants         PREVENTIVE HEALTH MAINTENANCE             BONE DENSITY: was last done 6/28/2018 with the following abnormality noted-- osteopenia     COLORECTAL CANCER SCREENING: Up to date (colonoscopy q10y; sigmoidoscopy q5y; Cologuard q3y) was last done 11/13/17, Results are in chart; colonoscopy with normal results; The next colonoscopy is due  11/2027; diverticulosis and hemorrhoids     EYE EXAM: was last done 3/2018     MAMMOGRAM: Done within last 2 years and results in are chart was last done 5/1/2018 with normal results     PAP SMEAR: No longer indicated due to age and history hysterectomy         PAST MEDICAL HISTORY             CURRENT MEDICAL PROVIDERS:    Dentist: Dr lange    General Surgeon: Apryl Denton    Ophthalmologist: Dr. Gallardo/Dr. Aguiar         PAST MEDICAL HISTORY                 ADVANCED DIRECTIVES: None         Surgical History:         Cholecystectomy      Hernia Repair: ventral hernia;     Hysterectomy: total, d/t endometriosis;     cornea transplant x 3;    colectomy (14 in) d/t diverticulosis;    R ankle ORIF;         Family History:     Father: Lung Cancer ( dec at age 54 )     Mother: Coronary Artery Disease         Social History:     Occupation: Homemaker    . Retired     Marital Status:      Children: 1 child         Tobacco/Alcohol/Supplements:     Last Reviewed on 12/19/2018 02:56 PM by Maria E Melgar    Tobacco: She has never  smoked.          Substance Abuse History:     Last Reviewed on 12/19/2018 02:56 PM by Maria E Melgar        Mental Health History:     Last Reviewed on 12/19/2018 02:56 PM by Maria E Melgar        Communicable Diseases (eg STDs):     Last Reviewed on 12/19/2018 02:56 PM by Maria E Melgar            Current Problems:     Last Reviewed on 9/19/2018 01:12 PM by Maria E Melgar    Screening for cancer of colon     Hypertension     Use of high risk medications     Anxiety     Rotator cuff tear     Insomnia         Immunizations:     None        Allergies:     Last Reviewed on 9/24/2018 01:42 PM by Soha Cuellar      No Known Drug Allergies.         Current Medications:     Last Reviewed on 9/24/2018 01:42 PM by Soha Cuellar    Atenolol 25mg Tablet Take one tablet by mouth in the morning and 1/4 of a tablet at night     Escitalopram Oxalate 10mg Tablet 1/2 tab daily     Lorazepam 1mg Tablet 1 tab Q HS PRN     AREDS eye supplement daily     Calcium 500mg/Vitamin D 400IU supplement PRN     Cortisol manager supplement one daily     Melatonin 3mg Tablet 1 po qhs     Pilocarpine HCl 1% Ophthalmic Solution Instill one drop in left eye at HS     Fluorometholone 0.1% Ophthalmic Suspension instill 1 drop each eye QD     Ibuprofen 200mg Tablet 2 tab bid PRN         OBJECTIVE:        Vitals:         Current: 12/19/2018 2:41:57 PM    Ht:  5 ft, 1.5 in;  Wt: 212 lbs;  BMI: 39.4    T: 97.8 F (oral);  BP: 137/61 mm Hg (left arm, sitting);  P: 55 bpm (left arm (BP Cuff), sitting);  sCr: 0.69 mg/dL;  GFR: 79.31        Exams:     PHYSICAL EXAM:     GENERAL: vital signs recorded - well developed, well nourished;  well groomed;  no apparent distress;     EYES: extraocular movements intact; conjunctiva and cornea are normal; right coloboma;     E/N/T: OROPHARYNX: oral mucosa is normal; posterior pharynx shows cobblestone appearance and no exudate;     RESPIRATORY: normal respiratory rate and pattern with no distress; normal breath  sounds with no rales, rhonchi, wheezes or rubs;     CARDIOVASCULAR: normal rate; rhythm is regular;  no systolic murmur; no edema;     GASTROINTESTINAL: nontender; normal bowel sounds;     MUSCULOSKELETAL: normal gait; normal overall tone     PSYCHIATRIC: appropriate affect and demeanor; normal psychomotor function; normal speech pattern;         Lab/Test Results:             Amphetamines Screen, Urin:  Negative (12/19/2018),     BAR-Barbiturates Screen, Urin:  Negative (12/19/2018),     Buprenorphine:  Negative (12/19/2018),     BZO-Benzodiazepines Screen,Ur:  Positive (12/19/2018),     Cocaine(Metab.)Screen, Ur:  Negative (12/19/2018),     MDMA-Ecstasy:  Negative (12/19/2018),     Met-Methamphetamine:  Negative (12/19/2018),     MTD-Methadone Screen, Urine:  Negative (12/19/2018),     Opiate Screen, Urine:  Negative (12/19/2018),     OXY-Oxycodone:  Negative (12/19/2018),     PCP-Phencyclidine Screen, Uri:  Negative (12/19/2018),     THC Cannabinoids Screen, Urin:  Negative (12/19/2018),     Urine temperature:  confirmed (12/19/2018),     Date and time of last pill:  Lorazepam 12/18/18 @11pm/AS (12/19/2018),     Performed by:  natty (12/19/2018),     Collection Time:  1455 (12/19/2018),             ASSESSMENT           307.41   F51.01  Insomnia              DDx:     V58.69   Z79.899  Use of high risk medications              DDx:     300.02   F41.9  Anxiety              DDx:     401.1   I10  Hypertension              DDx:     786.2   R05  Cough              DDx:         ORDERS:         Radiology/Test Orders:       3014F  Screening mammography results documented and reviewed (PV)1  (In-House)         3017F  Colorectal CA screen results documented and reviewed (PV)  (In-House)           Lab Orders:       [id number]  Drug test prsmv qual dir optical obs per day  (In-House)         APPTO  Appointment need  (In-House)           Other Orders:         Calculated BMI above the upper parameter and a follow-up plan was  documented in the medical record  (In-House)                   PLAN:          Insomnia She is stable on escitalopram and lorazepam for her insomnia and anxiety.  She has been on this regimen for quite some time.  No adverse effects.  She does require ongoing use of this controlled substance to function.  Tox screen and Kobi run today.  RTC 3 months.     MIPS Vaccines Flu and Pneumonia updated in Shot record     MAMMOGRAM: Done within last 2 years and results in are chart     COLORECTAL CANCER SCREENING: Results are in chart     BMI Elevated - Follow-Up Plan: She was provided education on weight loss strategies     FOLLOW-UP: Schedule a follow-up visit in 3 months..  f/u anxiety with [place]           Orders:       APPTO  Appointment need  (In-House)         3014F  Screening mammography results documented and reviewed (PV)1  (In-House)         3017F  Colorectal CA screen results documented and reviewed (PV)  (In-House)           Calculated BMI above the upper parameter and a follow-up plan was documented in the medical record  (In-House)             Patient Education Handouts:       [facility] Medication Compliance           Use of high risk medications     Controlled substance documentation: Kobi reviewed; drug screen performed and appropriate; consent is reviewed and signed and on the chart.  She is aware of risk of addiction on this medication, understands that she will need to follow up for a review every 3 months and her medications will be adjusted or decreased as deemed appropriate at each visit.  No history of drug or alcohol abuse.  No concerns about diversion or abuse. She denies side effects related to the medication.  She is aware that she may be called in for pill counts.  The dosing of this medication will be reviewed on a regular basis and reduced if possible..  Ongoing use of a controlled substance is necessary for this patient to have a normal quality of life           Orders:       75519  Drug  test prsmv qual dir optical obs per day  (In-House)            Anxiety As above.          Hypertension BP at goal.  Labs UTD.  No refills needed.          Cough Likely allergies based on exam.  However, she cannot take antihistamines (already has dry eye) or intranasal steroids (glaucoma).  OK to try nasal saline.   Can also try Zantac to see if this may be GERD.  Will f/u at next visit.             Patient Recommendations:        For  Insomnia:     Schedule a follow-up visit in 3 months.                APPOINTMENT INFORMATION:        Monday Tuesday Wednesday Thursday Friday Saturday Sunday            Time:___________________AM  PM   Date:_____________________             CHARGE CAPTURE           **Please note: ICD descriptions below are intended for billing purposes only and may not represent clinical diagnoses**        Primary Diagnosis:         307.41 Insomnia            F51.01    Primary insomnia              Orders:          [id number]   Office/outpatient visit; established patient, level 4  (In-House)             APPTO   Appointment need  (In-House)             3014F   Screening mammography results documented and reviewed (PV)1  (In-House)             3017F   Colorectal CA screen results documented and reviewed (PV)  (In-House)                Calculated BMI above the upper parameter and a follow-up plan was documented in the medical record  (In-House)           V58.69 Use of high risk medications            Z79.899    Other long term (current) drug therapy              Orders:          [id number]   Drug test prsmv qual dir optical obs per day  (In-House)           300.02 Anxiety            F41.9    Anxiety disorder, unspecified    401.1 Hypertension            I10    Essential (primary) hypertension    786.2 Cough            R05    Cough

## 2021-05-18 NOTE — PROGRESS NOTES
"Yanni Huynh  1945     Office/Outpatient Visit    Visit Date: Fri, Boris 10, 2020 02:54 pm    Provider: Maria E Melgar MD (Assistant: Brittney Tavares MA)    Location: [facility]        Electronically signed by Maria E Melgar MD on  01/10/2020 05:22:00 PM                             Subjective:        CC: Mrs. Huynh is a 74 year old White female.  Patient presents today for three month follow up;         HPI: Yanni is here today to follow up on chronic issues.        She is having sinus pain and tenderness. She has nasal soreness in the R nare for months now.  She has been diagnosed with deviated septum in the past.  +Cough, from drainage.          She also had another \"choking\" spell on New Year's Yuko.  This is the second one now.  Both have been associated with coughing spells.  The first episode woke her from sleep.  The second episode occurred while she had a coughing fit just as she was starting to take her pills.  The coughing spell occurred, and she spit out the pills and water (she states she did not aspirate on any of this them).  Then she was unable to breathe.  Her breathing is cut off and \"I make a squeaky noise.\"  She is unsure how long it lasts -- \"it feels like minutes but it's probably not really that long.\"  The breathing comes back slowly, feeling as though something in the throat is relaxing.  She was diagnosed with a hiatal hernia years ago and was prescribed an acid blocker for a time, but she really does not have much trouble with reflux in the general way of things, so she does not usually take anything.  She occasionally uses Rolaids.        She is on escitalopram for anxiety which has been very helpful.  No anxiety or panic attacks.  Mood is \"good.\"  No adverse effects.  She is on lorazepam and melatonin both for insomnia.  She has been on the lorazepam for years now.  She saw Dr. Demetrius Romero PhD in 2015 who did work with her to significantly decrease the amount of " medication she was using, but she was unable to get off of it altogether.  She has tried trazodone and Ambien in the past but had intolerable side effects with both.        She is on atenolol for HTN.  BP has been well controlled.  No CP, palpitations, or SOB.         She is on ibuprofen and topical diclofenac for L shoulder pain from a torn rotator cuff.  She follows with Dr. Dela Cruz.    ROS:     CONSTITUTIONAL:  Negative for fatigue and fever.      EYES:  Negative for blurred vision.      E/N/T:  Negative for nasal congestion and frequent rhinorrhea.      CARDIOVASCULAR:  Positive for pedal edema.   Negative for chest pain or palpitations.      RESPIRATORY:  Positive for chronic cough.   Negative for recent cough or dyspnea.      GASTROINTESTINAL:  Negative for abdominal pain, constipation, diarrhea, nausea and vomiting.      MUSCULOSKELETAL:  Positive for arthralgias and (bilateral shoulders) limb pain ( right leg pain ).   Negative for myalgias.      PSYCHIATRIC:  Negative for anxiety, depression and sleep disturbance.          Past Medical History / Family History / Social History:         Last Reviewed on 1/10/2020 03:11 PM by Maria E Melgar    Past Medical History:                 PAST MEDICAL HISTORY         Hypertension     keratoconus s/p 3 cornea transplants         PREVENTIVE HEALTH MAINTENANCE             BONE DENSITY: was last done 6/28/2018 with the following abnormality noted-- osteopenia     COLORECTAL CANCER SCREENING: Up to date (colonoscopy q10y; sigmoidoscopy q5y; Cologuard q3y) was last done 11/13/17, Results are in chart; colonoscopy with normal results; The next colonoscopy is due  11/2027; diverticulosis and hemorrhoids     EYE EXAM: was last done 03/2019 Dr Eng     MAMMOGRAM: Done within last 2 years and results in are chart was last done 5/1/2018 with normal results     PAP SMEAR: No longer indicated due to age and history hysterectomy         PAST MEDICAL HISTORY             CURRENT  MEDICAL PROVIDERS:    Dentist: Dr lange    General Surgeon: Apryl Denton    Ophthalmologist: Dr. Gallardo/Dr. Aguiar         PAST MEDICAL HISTORY                 ADVANCED DIRECTIVES: None         Surgical History:         Cholecystectomy     Hernia Repair: ventral hernia;     Hysterectomy: total, d/t endometriosis;     cornea transplant x 3;    colectomy (14 in) d/t diverticulosis;    R ankle ORIF;         Family History:     Father: Lung Cancer ( dec at age 54 )     Mother: Coronary Artery Disease         Social History:     Occupation: Homemaker    . Retired     Marital Status:      Children: 1 child         Tobacco/Alcohol/Supplements:     Last Reviewed on 1/10/2020 03:11 PM by Maria E Melgar    Tobacco: She has never smoked.  Non-drinker     Caffeine:  She admits to consuming caffeine via tea ( -occassionally, not often ).          Substance Abuse History:     Last Reviewed on 1/10/2020 03:11 PM by Maria E Melgar        Mental Health History:     Last Reviewed on 1/10/2020 03:11 PM by Maria E Melgar        Communicable Diseases (eg STDs):     Last Reviewed on 1/10/2020 03:11 PM by Maria E Melgar        Current Problems:     Last Reviewed on 10/10/2019 01:41 PM by Maria E Melgar    Anxiety disorder, unspecified    Primary insomnia    Essential (primary) hypertension    Sprain of left rotator cuff capsule, subsequent encounter    Other long term (current) drug therapy    Anxiety    Insomnia    Hypertension    Rotator cuff tear    Use of high risk medications    Encounter for screening for malignant neoplasm of colon    Screening for cancer of colon    History of fall    History of falling    Low back pain    Lower back pain    Pain in right knee    Contusion of right knee        Immunizations:     None        Allergies:     Last Reviewed on 11/21/2019 11:40 AM by Josef Diggs    No Known Allergies.        Current Medications:     Last Reviewed on 11/21/2019 11:40 AM by Josef Diggs     Cortisol manager supplement one daily     AREDS eye supplement daily     Melatonin 3 mg oral tablet [ 1 po qhs]    Calcium 500mg/Vitamin D 400IU supplement PRN     Ibuprofen 200 mg oral tablet [2 tab bid PRN]    Pilocarpine HCl 1 % ophthalmic (eye) Drops [Instill one drop in left eye at HS]    Lorazepam 1mg Tablet [1 tab Q HS PRN]    Escitalopram Oxalate 10 mg oral tablet [1/2 tab daily]    atenolol 25 mg oral tablet [TAKE ONE TABLET BY MOUTH EVERY MORNING AND THEN TAKE 1/4 TABLET AT NIGHT]    Diclofenac Sodium 75 mg oral tablet, delayed release (enteric coated) [1 tab bid with food PRN]    Fluorometholone 0.1 % ophthalmic (eye) Drops, Suspension [instill 1 drop each eye QD]        Objective:        Vitals:         Current: 1/10/2020 3:02:22 PM    Ht:  5 ft, 1.5 in;  Wt: 207 lbs;  BMI: 38.5T: 98.1 F (oral);  BP: 148/61 mm Hg (left arm, sitting);  P: 54 bpm (left arm (BP Cuff), sitting);  sCr: 0.63 mg/dL;  GFR: 84.71        Repeat:     3:53:40 PM  BP:   138/71mm Hg (left arm, sitting, HR: 57)     Exams:     PHYSICAL EXAM:     GENERAL: vital signs recorded - well developed, well nourished;  well groomed;  no apparent distress;     EYES: extraocular movements intact; conjunctiva and cornea are normal; right coloboma;     E/N/T: NOSE: nasal mucosa is edematous and erythematous;  OROPHARYNX: oral mucosa is normal; posterior pharynx shows cobblestone appearance and no exudate;     RESPIRATORY: normal respiratory rate and pattern with no distress; normal breath sounds with no rales, rhonchi, wheezes or rubs;     CARDIOVASCULAR: normal rate; rhythm is regular;  no systolic murmur; no edema;     MUSCULOSKELETAL: normal gait; normal overall tone     PSYCHIATRIC: appropriate affect and demeanor; normal psychomotor function; normal speech pattern;         Lab/Test Results:         Amphetamines Screen, Urin: Negative (01/10/2020),     BAR-Barbiturates Screen, Urin: Negative (01/10/2020),     Buprenorphine: Negative (01/10/2020),      BZO-Benzodiazepines Screen,Ur: Positive (01/10/2020),     Cocaine(Metab.)Screen, Ur: Negative (01/10/2020),     MDMA-Ecstasy: Negative (01/10/2020),     Met-Methamphetamine: Negative (01/10/2020),     MTD-Methadone Screen, Urine: Negative (01/10/2020),     Opiate Screen, Urine: Negative (01/10/2020),     OXY-Oxycodone: Negative (01/10/2020),     PCP-Phencyclidine Screen, Uri: Negative (01/10/2020),     THC Cannabinoids Screen, Urin: Negative (01/10/2020),     Urine temperature: confirmed (01/10/2020),     Date and time of last pill: Lorazepam 01/10/2020 @ 0100/AS (01/10/2020),     Performed by: natty (01/10/2020),     Collection Time: 1520 (01/10/2020),             Assessment:         F51.01   Primary insomnia       Z79.899   Other long term (current) drug therapy       F41.9   Anxiety disorder, unspecified       I10   Essential (primary) hypertension       Z13.31   Encounter for screening for depression       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       J30.9   Allergic rhinitis, unspecified           ORDERS:         Meds Prescribed:       [New Rx] fluticasone propionate 50 mcg/actuation Intranasal Spray, Suspension [inhale 1 spray (50 mcg) in each nostril by intranasal route once daily], #16 (sixteen) grams, Refills: 2 (two)         Radiology/Test Orders:       3017F  Colorectal CA screen results documented and reviewed (PV)  (In-House)              Lab Orders:       [id number]  Drug test prsmv qual dir optical obs per day  (In-House)            APPTO  Appointment need  (In-House)            88705  [facility] - [facility] CMP AND LIPID: [id number], [id number]  (Send-Out)              Procedures Ordered:       REFER  Referral to Specialist or Other Facility  (Send-Out)              Other Orders:       1100F  Pt screen for fall risk; document 2+ falls in the past yr or any fall w/injury in past year (ROS)  (In-House)              Screening mammogram results documented  (Send-Out)              Depression screen negative  (In-House)                       Plan:         Primary insomniaShe is stable on her current regimen.  Sleep is well controlled.  No adverse effects.  Continue to wean as able.  She does require ongoing use of this controlled substance to function.  Tox screen and Kobi run today.  No refills needed today.  RTC 3 months.    MIPS PHQ-9 Depression Screening: Completed form scanned and in chart; Total Score 0; Negative Depression Screen     FOLLOW-UP: Schedule a follow-up visit in 3 months.:.  f/u insomnia with [place]          Orders:       1100F  Pt screen for fall risk; document 2+ falls in the past yr or any fall w/injury in past year (ROS)  (In-House)              Screening mammogram results documented  (Send-Out)            3017F  Colorectal CA screen results documented and reviewed (PV)  (In-House)            APPTO  Appointment need  (In-House)              Depression screen negative  (In-House)              Other long term (current) drug therapy    Controlled substance documentation: Kobi reviewed; drug screen performed and appropriate; consent is reviewed and signed and on the chart.  She is aware of risk of addiction on this medication, understands that she will need to follow up for a review every 3 months and her medications will be adjusted or decreased as deemed appropriate at each visit.  No history of drug or alcohol abuse.  No concerns about diversion or abuse. She denies side effects related to the medication.  She is aware that she may be called in for pill counts.  The dosing of this medication will be reviewed on a regular basis and reduced if possible..  Ongoing use of a controlled substance is necessary for this patient to have a normal quality of life           Orders:       [id number]  Drug test prsmv qual dir optical obs per day  (In-House)              Anxiety disorder, unspecifiedStable.  No refills needed.          Essential (primary) hypertensionBP at goal.  Checking labs.    LABORATORY:  Labs ordered to be  "performed today include HTN/Lipid Panel: CMP, Lipid.            Orders:       34136  [facility] - [facility] CMP AND LIPID: [id number], [id number]  (Send-Out)              Laryngeal spasmConcern for laryngeal spasm.        She also had another \"choking\" spell on New Year's Yuko.  This is the second one now.  Both have been associated with coughing spells.  The first episode woke her from sleep.  The second episode occurred while she had a coughing fit just as she was starting to take her pills.  The coughing spell occurred, and she spit out the pills and water (she states she did not aspirate on any of this them).  Then she was unable to breathe.  Her breathing is cut off and \"I make a squeaky noise.\"  She is unsure how long it lasts -- \"it feels like minutes but it's probably not really that long.\"  The breathing comes back slowly, feeling as though something in the throat is relaxing.  She was diagnosed with a hiatal hernia years ago and was prescribed an acid blocker for a time, but she really does not have much trouble with reflux in the general way of things, so she does not usually take anything.  She occasionally uses Rolaids.            REFERRALS:  Referral initiated to a pulmonologist ( Dr. Dhiraj Rdz, , [facility]; for evaluation of laryngeal spasm ).            Orders:       REFER  Referral to Specialist or Other Facility  (Send-Out)              Allergic rhinitis, unspecifiedStart daily Flonase.          Prescriptions:       [New Rx] fluticasone propionate 50 mcg/actuation Intranasal Spray, Suspension [inhale 1 spray (50 mcg) in each nostril by intranasal route once daily], #16 (sixteen) grams, Refills: 2 (two)             Patient Recommendations:        For  Primary insomnia:    Schedule a follow-up visit in 3 months.                APPOINTMENT INFORMATION:        Monday Tuesday Wednesday Thursday Friday Saturday Sunday            Time:___________________AM  PM   Date:_____________________     "         Charge Capture:         Primary Diagnosis:     F51.01  Primary insomnia           Orders:      87492  Office/outpatient visit; established patient, level 4  (In-House)            1100F  Pt screen for fall risk; document 2+ falls in the past yr or any fall w/injury in past year (ROS)  (In-House)            3017F  Colorectal CA screen results documented and reviewed (PV)  (In-House)            APPTO  Appointment need  (In-House)              Depression screen negative  (In-House)              Z79.899  Other long term (current) drug therapy           Orders:      47776  Drug test prsmv qual dir optical obs per day  (In-House)              F41.9  Anxiety disorder, unspecified     I10  Essential (primary) hypertension     Z13.31  Encounter for screening for depression     J38.5  Laryngeal spasm     J30.9  Allergic rhinitis, unspecified
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
## 2021-05-18 NOTE — PROGRESS NOTES
"Yanni Huynh  1945     Office/Outpatient Visit    Visit Date: Tue, Sep 29, 2020 10:52 am    Provider: Maria E Wolfe MD (Assistant: Brittney Tavares MA)    Location: [facility]        Electronically signed by Maria E Melgar MD on  09/29/2020 04:59:22 PM                             Subjective:        CC: Mrs. Huynh is a 74 year old White female.  Patient presents today for follow up visit;         HPI: Yanni is here today for routine follow-up on chronic issues.        She is on escitalopram for anxiety.  Sx of anxiety and panic attacks have been well controlled.  Mood is \"good.\"  No adverse effects.  She is on lorazepam and melatonin both for insomnia.  She has been on the lorazepam for years now.  She saw Dr. Demetrius Romero PhD in 2015 who did work with her to significantly decrease the amount of medication she was using, but she was unable to stop the lorazepam altogether.  She has tried trazodone and Ambien in the past but had intolerable side effects with both.        She is on atenolol for HTN.  BP has been well controlled.  No CP, palpitations, or SOB.         She is on ibuprofen and topical diclofenac for L shoulder pain from a torn rotator cuff.  She follows with Dr. Dela Cruz.  She went to him    ROS:     CONSTITUTIONAL:  Negative for fatigue and fever.      EYES:  Negative for blurred vision and itching.      E/N/T:  Positive for nasal congestion, frequent rhinorrhea and itchy ears.      CARDIOVASCULAR:  Positive for pedal edema.   Negative for chest pain or palpitations.      RESPIRATORY:  Positive for chronic cough.   Negative for recent cough or dyspnea.      GASTROINTESTINAL:  Negative for abdominal pain, constipation, diarrhea, nausea and vomiting.      MUSCULOSKELETAL:  Positive for arthralgias and (bilateral shoulders, right knee) limb pain ( right leg pain ).   Negative for myalgias.      PSYCHIATRIC:  Negative for anxiety, depression and sleep disturbance.          " Past Medical History / Family History / Social History:         Last Reviewed on 9/29/2020 11:11 AM by Maria E Melgar    Past Medical History:                 PAST MEDICAL HISTORY         Hypertension     keratoconus s/p 3 cornea transplants         PREVENTIVE HEALTH MAINTENANCE             BONE DENSITY: was last done 7/15/2020 with the following abnormality noted-- osteopenia     COLORECTAL CANCER SCREENING: Up to date (colonoscopy q10y; sigmoidoscopy q5y; Cologuard q3y) was last done 11/13/17, Results are in chart; colonoscopy with normal results; The next colonoscopy is due  11/2027; diverticulosis and hemorrhoids     EYE EXAM: was last done 03/2019 Dr Eng     MAMMOGRAM: Done within last 2 years and results in are chart was last done 5/1/2018 with normal results     PAP SMEAR: No longer indicated due to age and history hysterectomy         PAST MEDICAL HISTORY             CURRENT MEDICAL PROVIDERS:    Dentist: Dr lange    General Surgeon: Apryl Denton    Ophthalmologist: Dr. Gallardo/Dr. Aguiar         PAST MEDICAL HISTORY                 ADVANCED DIRECTIVES: None         Surgical History:         Cholecystectomy     Hernia Repair: ventral hernia;     Hysterectomy: total, d/t endometriosis;     cornea transplant x 3;    colectomy (14 in) d/t diverticulosis;    R ankle ORIF;         Family History:     Father: Lung Cancer ( dec at age 54 )     Mother: Coronary Artery Disease         Social History:     Occupation: Homemaker    . Retired     Marital Status:      Children: 1 child         Tobacco/Alcohol/Supplements:     Last Reviewed on 9/29/2020 11:11 AM by Maira E Melgar    Tobacco: She has never smoked.  Non-drinker     Caffeine:  She admits to consuming caffeine via tea ( -occassionally, not often ).          Substance Abuse History:     Last Reviewed on 9/29/2020 11:11 AM by Maria E Melgar        Mental Health History:     Last Reviewed on 9/29/2020 11:11 AM by Maria E Melgar         Communicable Diseases (eg STDs):     Last Reviewed on 9/29/2020 11:11 AM by Maria E Melgar        Current Problems:     Last Reviewed on 7/07/2020 04:54 PM by Maria E Melgar    Anxiety disorder, unspecified    Primary insomnia    HTN - Essential (primary) hypertension    Sprain of left rotator cuff capsule, subsequent encounter    Other long term (current) drug therapy    History of falling    Low back pain    Pain in right knee    Contusion of right knee    Allergic rhinitis, unspecified    Laryngeal spasm    Asymptomatic menopausal state        Immunizations:     None        Allergies:     Last Reviewed on 9/29/2020 10:55 AM by Brittney Tavares    No Known Allergies.        Current Medications:     Last Reviewed on 9/29/2020 10:55 AM by Brittney Tavares    Cortisol manager supplement one daily     AREDS eye supplement daily     Calcium 500mg/Vitamin D 400IU supplement PRN     Ibuprofen 200 mg oral tablet [2 tab bid PRN]    Pilocarpine HCl 1 % ophthalmic (eye) Drops [Instill one drop in left eye at HS]    atenolol 25 mg oral tablet [TAKE ONE TABLET BY MOUTH EVERY MORNING AND THEN TAKE 1/4 TABLET AT NIGHT]    escitalopram oxalate 10 mg oral tablet [Take 1/2 (one-half) tablet by mouth once daily]    LORazepam 1 mg oral tablet [TAKE 1 TABLET BY MOUTH ONCE DAILY AT BEDTIME AS NEEDED]    melatonin 3 mg oral tablet [ 1/2 po qhs]    Fluorometholone 0.1 % ophthalmic (eye) Drops, Suspension [instill 1 drop each eye QD]    fluticasone propionate 50 mcg/actuation Intranasal Spray, Suspension [inhale 1 spray (50 mcg) in each nostril by intranasal route once daily]        Objective:        Vitals:         Current: 9/29/2020 10:56:42 AM    Ht:  5 ft, 1.5 in;  Wt: 204.2 lbs;  BMI: 38.0T: 97.1 F (temporal);  BP: 146/67 mm Hg (left arm, sitting);  P: 56 bpm (left arm (BP Cuff), sitting);  sCr: 0.56 mg/dL;  GFR: 94.75        Repeat:     11:36:6 AM  BP:   146/60mm Hg (left arm, sitting, HR: 54)     Exams:     PHYSICAL EXAM:      GENERAL: vital signs recorded - well developed, well nourished;  well groomed;  no apparent distress;     EYES: extraocular movements intact; conjunctiva and cornea are normal; right coloboma;     E/N/T: EARS: external auditory canal occluded by cerumen bilaterally;  NOSE: turbinates are mildly swollen bilaterally;  OROPHARYNX:  normal mucosa, dentition, gingiva, and posterior pharynx;     RESPIRATORY: normal respiratory rate and pattern with no distress; normal breath sounds with no rales, rhonchi, wheezes or rubs;     CARDIOVASCULAR: normal rate; rhythm is regular;  no systolic murmur; no edema;     GASTROINTESTINAL: nontender; normal bowel sounds;     MUSCULOSKELETAL: normal gait; normal overall tone     PSYCHIATRIC: appropriate affect and demeanor; normal psychomotor function; normal speech pattern;         Lab/Test Results:         Amphetamines Screen, Urin: Negative (09/29/2020),     BAR-Barbiturates Screen, Urin: Negative (09/29/2020),     Buprenorphine: Negative (09/29/2020),     BZO-Benzodiazepines Screen,Ur: Positive (09/29/2020),     Cocaine(Metab.)Screen, Ur: Negative (09/29/2020),     MDMA-Ecstasy: Negative (09/29/2020),     Met-Methamphetamine: Negative (09/29/2020),     MTD-Methadone Screen, Urine: Negative (09/29/2020),     Opiate Screen, Urine: Negative (09/29/2020),     OXY-Oxycodone: Negative (09/29/2020),     PCP-Phencyclidine Screen, Uri: Negative (09/29/2020),     THC Cannabinoids Screen, Urin: Negative (09/29/2020),     Urine temperature: confirmed (09/29/2020),     Date and time of last pill: Lorazepam 09/29/2020 @ 2430/AS (09/29/2020),     Performed by: ezekiel (09/29/2020),     Collection Time: 11:15 (09/29/2020),             Procedures:     Impacted cerumen, bilateral        Cerumen/Wax removal: Cerumen impaction is noted in both ears The degree of wax accumulation is moderate in the left ear and right ear.  With moderate dificulty, using a syringe irrigation, the wax is removed.  Removed from  ear was hard balls of wax.  The patient tolerated the procedure well.      There were no complications.  Performed by: AS             Assessment:         F51.01   Primary insomnia       Z79.899   Other long term (current) drug therapy       F41.9   Anxiety disorder, unspecified       I10   HTN - Essential (primary) hypertension       H61.23   Impacted cerumen, bilateral           ORDERS:         Radiology/Test Orders:       3017F  Colorectal CA screen results documented and reviewed (PV)  (In-House)              Lab Orders:       30502  HTNLP - [facility] CMP AND LIPID: [id number], [id number]  (Send-Out)            70773  Drug test prsmv qual dir optical obs per day  (In-House)            APPTO  Appointment need  (In-House)              Procedures Ordered:       [id number]  Removal of impacted cerumen right ear (NURSE)  (In-House)              Other Orders:       1124F  Advance Care Planning discussed and doc in MR; no surrogate named or advance care plan provided  (Send-Out)            [id number]  Removal of impacted cerumen left ear (NURSE)  (In-House)                      Plan:         Primary insomniaStable on current regimen.  Sx well controlled.  No adverse effects.  She does require ongoing use of this controlled substance to function.  Tox screen and Kobi run today.  No refills needed.  RTC 5 months.    MIPS Vaccines Flu and Pneumonia updated in Shot record Colorectal Cancer Screening is up to date and the results are in the chart Advance Directive/Surrogate Decision Maker discussed and pt declines to complete today     FOLLOW-UP: Schedule a follow-up visit in 5 months.:.  Medicare wellness 30 min with [facility]          Orders:       3017F  Colorectal CA screen results documented and reviewed (PV)  (In-House)            1124F  Advance Care Planning discussed and doc in MR; no surrogate named or advance care plan provided  (Send-Out)            APPTO  Appointment need  (In-House)              Other long term (current) drug  therapy    Controlled substance documentation: Kobi reviewed; drug screen performed and appropriate; consent is reviewed and signed and on the chart.  She is aware of risk of addiction on this medication, understands that she will need to follow up for a review every 3 months and her medications will be adjusted or decreased as deemed appropriate at each visit.  No history of drug or alcohol abuse.  No concerns about diversion or abuse. She denies side effects related to the medication.  She is aware that she may be called in for pill counts.  The dosing of this medication will be reviewed on a regular basis and reduced if possible..  Ongoing use of a controlled substance is necessary for this patient to have a normal quality of life           Orders:       13830  Drug test prsmv qual dir optical obs per day  (In-House)              Anxiety disorder, unspecifiedStable.  No refills needed.        HTN - Essential (primary) hypertensionStable.  Checking labs.  No refills needed.    LABORATORY:  Labs ordered to be performed today include HTN/Lipid Panel: CMP, Lipid.            Orders:       [id number]  HTN - [facility] CMP AND LIPID: [id number], [id number]  (Send-Out)              Impacted cerumen, bilateralBilateral ear washout today.  Tolerated this well.          Orders:       [id number]  Removal of impacted cerumen left ear (NURSE)  (In-House)            [id number]  Removal of impacted cerumen right ear (NURSE)  (In-House)                  Patient Recommendations:        For  Primary insomnia:    Schedule a follow-up visit in 5 months.                APPOINTMENT INFORMATION:        Monday Tuesday Wednesday Thursday Friday Saturday Sunday            Time:___________________AM  PM   Date:_____________________             Charge Capture:         Primary Diagnosis:     F51.01  Primary insomnia           Orders:      [id number]  Office/outpatient visit; established patient, level 4  (In-House)            3017F  Colorectal CA screen results  documented and reviewed (PV)  (In-House)            APPTO  Appointment need  (In-House)              Z79.899  Other long term (current) drug therapy           Orders:      [id number]  Drug test prsmv qual dir optical obs per day  (In-House)              F41.9  Anxiety disorder, unspecified     I10  HTN - Essential (primary) hypertension     H61.23  Impacted cerumen, bilateral           Orders:      [id number]  Removal of impacted cerumen left ear (NURSE)  (In-House)            [id number]  Removal of impacted cerumen right ear (NURSE)  (In-House)

## 2021-05-18 NOTE — PROGRESS NOTES
Yanni Huynh 1945     Office/Outpatient Visit    Visit Date: Wed, Sep 11, 2019 02:08 pm    Provider: Natividad Rodgers N.P. (Assistant: Solange Lomax LPN)    Location: [facility]        Electronically signed by Natividad Rodgers N.P. on  09/11/2019 02:38:18 PM                             SUBJECTIVE:        CC:     Mrs. Huynh is a 73 year old White female.  pt fell monday, going up concrete steps. has bruises present to both lower legs, just above ankles. shes not in much pain, but states they are sore.;         HPI: Yanni presents with reports of fall 2 days ago. She fell on concrete steps and hit both of her lower legs above ankles. She denies LOC or hitting head. Now with bruising on both. She has been putting ice on it and taking Ibuprofen. Swelling has improved. She is getting ready to go to [place] so wanted to have it looked at prior to leaving. Denies pain elsewhere.             ROS:     CONSTITUTIONAL:  Negative for chills and fever.      CARDIOVASCULAR:  Negative for chest pain and palpitations.      RESPIRATORY:  Negative for dyspnea and frequent wheezing.      INTEGUMENTARY/BREAST:  Positive for bruising to bilateral lower extremities.      NEUROLOGICAL:  Negative for dizziness and headaches.      PSYCHIATRIC:  Negative for depression and suicidal thoughts.          PMH/FMH/SH:     Last Reviewed on 7/10/2019 02:21 PM by Maria E Melgar    Past Medical History:                 PAST MEDICAL HISTORY         Hypertension     keratoconus s/p 3 cornea transplants         PREVENTIVE HEALTH MAINTENANCE             BONE DENSITY: was last done 6/28/2018 with the following abnormality noted-- osteopenia     COLORECTAL CANCER SCREENING: Up to date (colonoscopy q10y; sigmoidoscopy q5y; Cologuard q3y) was last done 11/13/17, Results are in chart; colonoscopy with normal results; The next colonoscopy is due  11/2027; diverticulosis and hemorrhoids     EYE EXAM: was last done 03/2019 Dr Eng     MAMMOGRAM:  Done within last 2 years and results in are chart was last done 5/1/2018 with normal results     PAP SMEAR: No longer indicated due to age and history hysterectomy         PAST MEDICAL HISTORY             CURRENT MEDICAL PROVIDERS:    Dentist: Dr lange    General Surgeon: Apryl Denton    Ophthalmologist: Dr. Gallardo/Dr. Aguiar         PAST MEDICAL HISTORY                 ADVANCED DIRECTIVES: None         Surgical History:         Cholecystectomy      Hernia Repair: ventral hernia;     Hysterectomy: total, d/t endometriosis;     cornea transplant x 3;    colectomy (14 in) d/t diverticulosis;    R ankle ORIF;         Family History:     Father: Lung Cancer ( dec at age 54 )     Mother: Coronary Artery Disease         Social History:     Occupation: Homemaker    . Retired     Marital Status:      Children: 1 child         Tobacco/Alcohol/Supplements:     Last Reviewed on 7/10/2019 02:21 PM by Maria E Melgar    Tobacco: She has never smoked.          Substance Abuse History:     Last Reviewed on 7/10/2019 02:21 PM by Maria E Melgar        Mental Health History:     Last Reviewed on 7/10/2019 02:21 PM by Maria E Melgar        Communicable Diseases (eg STDs):     Last Reviewed on 7/10/2019 02:21 PM by Maria E Melgar            Current Problems:     Last Reviewed on 7/10/2019 02:21 PM by Maria E Melgar    Screening for cancer of colon     Hypertension     Use of high risk medications     Anxiety     Rotator cuff tear     Insomnia         Immunizations:     None        Allergies:     Last Reviewed on 7/10/2019 02:21 PM by Maria E Melgar      No Known Drug Allergies.         Current Medications:     Last Reviewed on 7/10/2019 02:21 PM by Maria E Melgar    Lorazepam 1mg Tablet 1 tab Q HS PRN     Atenolol 25mg Tablet Take one tablet by mouth in the morning and 1/4 of a tablet at night     Escitalopram Oxalate 10mg Tablet 1/2 tab daily     AREDS eye supplement daily     Calcium 500mg/Vitamin D 400IU  supplement PRN     Cortisol manager supplement one daily     Melatonin 3mg Tablet 1 po qhs     Pilocarpine HCl 1% Ophthalmic Solution Instill one drop in left eye at HS     Fluorometholone 0.1% Ophthalmic Suspension instill 1 drop each eye QD     Ibuprofen 200mg Tablet 2 tab bid PRN         OBJECTIVE:        Vitals:         Historical:     07/10/2019  BP:   137/63 mm Hg ( (left arm, , sitting, );)     03/19/2019  BP:   141/58 mm Hg ( (left arm, , sitting, );)         Current: pt states her bp is usually elevated whenever she comes into the office. Sometimes when they recheck it its higher. 9/11/2019 2:15:48 PM    Ht:  5 ft, 1.5 in;  Wt: 205.4 lbs;  BMI: 38.2    T: 98.1 F (oral);  BP: 145/65 mm Hg (left arm, sitting);  P: 60 bpm (left arm (BP Cuff), sitting);  sCr: 0.6 mg/dL;  GFR: 89.99        Exams:     PHYSICAL EXAM:     GENERAL: well developed, well nourished;  no apparent distress;     RESPIRATORY: normal respiratory rate and pattern with no distress; normal breath sounds with no rales, rhonchi, wheezes or rubs;     CARDIOVASCULAR: normal rate; rhythm is regular;     BREAST/INTEGUMENT: SKIN: no significant rashes or lesions; no suspicious moles; healing bruising noted to bilateral lower extremities;     MUSCULOSKELETAL: normal gait; FROM of bilateral ankles, no pain with movement of ankle, tenderness to areas of bruising with palpation;     NEUROLOGIC: mental status: alert and oriented x 3; GROSSLY INTACT     PSYCHIATRIC: appropriate affect and demeanor;         ASSESSMENT           924.10   S80.10XA  Contusion of lower leg              DDx:     V15.88   Z91.81  History of fall              DDx:         PLAN:          Contusion of lower leg Reassurance provided. Bruises healing. Continue ice, Ibuprofen as needed per package instructions, elevating lower extremities. While driving to [place], stop and take frequent breaks to stretch her legs. Follow up if not improving and will consider imaging as needed.          FOLLOW-UP: Patient already scheduled for follow-up appointment with PCP          History of fall As above.             CHARGE CAPTURE           **Please note: ICD descriptions below are intended for billing purposes only and may not represent clinical diagnoses**        Primary Diagnosis:         924.10 Contusion of lower leg            S80.10XA    Contusion of unspecified lower leg, initial encounter              Orders:          40859   Office/outpatient visit; established patient, level 3  (In-House)           V15.88 History of fall            Z91.81    History of falling

## 2021-05-18 NOTE — PROGRESS NOTES
"Yanni Huynh 1945     Office/Outpatient Visit    Visit Date: Thu, Oct 10, 2019 01:17 pm    Provider: Maria E Melgar MD (Assistant: Vannessa Mazariegos RN)    Location: [facility]        Electronically signed by Maria E Melgar MD on  10/10/2019 02:28:51 PM                             SUBJECTIVE:        CC:     Mrs. Huynh is a 73 year old White female.  3 month follow up;         HPI: Yanni is here for routine follow-up on chronic issues.        She is on escitalopram for anxiety.  This has really helped her.  No anxiety or panic attacks.  Mood is \"good.\"  No adverse effects.  She is on lorazepam and melatonin both for insomnia.  She has been on the lorazepam for many years.  She saw Dr. Demetrius Romero PhD in 2015 who did work with her to significantly decrease the amount of medication she was using, but was unable to get off of it altogether.  She has tried trazodone and Ambien in the past but had intolerable side effects with both of these.        She woke up this morning with acutely worsened L low back/L hip pain.  However, she really has a long history of L lower back pain.  No radiation down the leg.  No radiation into the groin.  Pain is a dull ache.  No dysuria, hematuria, urgency or frequency.  She has tried heating pad.  She does frequently hip check her counter at home on that L side.        She is on atenolol for HTN.  BP has been well controlled.  No CP, palpitations, or SOB.         She is on ibuprofen and topical diclofenac for L shoulder pain from a torn rotator cuff.  She follows with Dr. Dela Cruz.  She is still having some shin tenderness after landing hard across a concrete step.  Mild pedal edema.      ROS:     CONSTITUTIONAL:  Negative for fatigue and fever.      EYES:  Negative for blurred vision.      E/N/T:  Negative for nasal congestion and frequent rhinorrhea.      CARDIOVASCULAR:  Positive for pedal edema.   Negative for chest pain or palpitations.      RESPIRATORY:  Positive " for chronic cough.   Negative for recent cough or dyspnea.      GASTROINTESTINAL:  Negative for abdominal pain, constipation, diarrhea, nausea and vomiting.      MUSCULOSKELETAL:  Positive for arthralgias (bilateral shoulders).   Negative for myalgias.      PSYCHIATRIC:  Negative for anxiety, depression and sleep disturbance.          PMH/FMH/SH:     Last Reviewed on 10/10/2019 01:41 PM by Maria E Melgar    Past Medical History:                 PAST MEDICAL HISTORY         Hypertension     keratoconus s/p 3 cornea transplants         PREVENTIVE HEALTH MAINTENANCE             BONE DENSITY: was last done 6/28/2018 with the following abnormality noted-- osteopenia     COLORECTAL CANCER SCREENING: Up to date (colonoscopy q10y; sigmoidoscopy q5y; Cologuard q3y) was last done 11/13/17, Results are in chart; colonoscopy with normal results; The next colonoscopy is due  11/2027; diverticulosis and hemorrhoids     EYE EXAM: was last done 03/2019 Dr Eng     MAMMOGRAM: Done within last 2 years and results in are chart was last done 5/1/2018 with normal results     PAP SMEAR: No longer indicated due to age and history hysterectomy         PAST MEDICAL HISTORY             CURRENT MEDICAL PROVIDERS:    Dentist: Dr lange    General Surgeon: Apryl Denton    Ophthalmologist: Dr. Gallardo/Dr. Aguiar         PAST MEDICAL HISTORY                 ADVANCED DIRECTIVES: None         Surgical History:         Cholecystectomy      Hernia Repair: ventral hernia;     Hysterectomy: total, d/t endometriosis;     cornea transplant x 3;    colectomy (14 in) d/t diverticulosis;    R ankle ORIF;         Family History:     Father: Lung Cancer ( dec at age 54 )     Mother: Coronary Artery Disease         Social History:     Occupation: Homemaker    . Retired     Marital Status:      Children: 1 child         Tobacco/Alcohol/Supplements:     Last Reviewed on 10/10/2019 01:41 PM by Maria E Melgar    Tobacco: She has never smoked.   Non-drinker     Caffeine:  She admits to consuming caffeine via tea ( -occassionally, not often ).          Substance Abuse History:     Last Reviewed on 10/10/2019 01:41 PM by Maria E Melgar        Mental Health History:     Last Reviewed on 10/10/2019 01:41 PM by Maria E Melgar        Communicable Diseases (eg STDs):     Last Reviewed on 10/10/2019 01:41 PM by Maria E Melgar            Current Problems:     Last Reviewed on 7/10/2019 02:21 PM by Maria E Melgar    History of fall     Screening for cancer of colon     Hypertension     Use of high risk medications     Anxiety     Contusion of lower leg     Rotator cuff tear     Insomnia         Immunizations:     None        Allergies:     Last Reviewed on 9/11/2019 02:16 PM by Solange Lomax      No Known Drug Allergies.         Current Medications:     Last Reviewed on 9/11/2019 02:16 PM by Solange Lomax    Lorazepam 1mg Tablet 1 tab Q HS PRN     Atenolol 25mg Tablet Take one tablet by mouth in the morning and 1/4 of a tablet at night     Escitalopram Oxalate 10mg Tablet 1/2 tab daily     AREDS eye supplement daily     Calcium 500mg/Vitamin D 400IU supplement PRN     Cortisol manager supplement one daily     Melatonin 3mg Tablet 1 po qhs     Pilocarpine HCl 1% Ophthalmic Solution Instill one drop in left eye at HS     Fluorometholone 0.1% Ophthalmic Suspension instill 1 drop each eye QD     Ibuprofen 200mg Tablet 2 tab bid PRN         OBJECTIVE:        Vitals:         Current: 10/10/2019 1:22:40 PM    Ht:  5 ft, 1.5 in;  Wt: 207.2 lbs;  BMI: 38.5    T: 98.4 F (oral);  BP: 146/60 mm Hg (right arm, sitting);  P: 56 bpm (right arm (BP Cuff), sitting);  sCr: 0.6 mg/dL;  GFR: 90.32        Repeat:     2:08:01 PM     BP:   122/64mm Hg (right arm, sitting, P-52)         Exams:     PHYSICAL EXAM:     GENERAL: vital signs recorded - well developed, well nourished;  well groomed;  no apparent distress;     EYES: extraocular movements intact; conjunctiva and cornea are normal;  right coloboma;     E/N/T: OROPHARYNX: oral mucosa is normal; posterior pharynx shows cobblestone appearance and no exudate;     RESPIRATORY: normal respiratory rate and pattern with no distress; normal breath sounds with no rales, rhonchi, wheezes or rubs;     CARDIOVASCULAR: normal rate; rhythm is regular;  no systolic murmur; no edema;     MUSCULOSKELETAL: normal gait; pain with range of motion in: left hip flexion and abduction;  muscle strength: 5/5 bilat hip flexors;  5/5 bilat hip extensors;  normal overall tone no TTP lumbar back; +TTP over L tronchanteric bursa     PSYCHIATRIC: appropriate affect and demeanor; normal psychomotor function; normal speech pattern;         Lab/Test Results:             Amphetamines Screen, Urin:  Negative (10/10/2019),     BAR-Barbiturates Screen, Urin:  Negative (10/10/2019),     Buprenorphine:  Negative (10/10/2019),     BZO-Benzodiazepines Screen,Ur:  Positive (10/10/2019),     Cocaine(Metab.)Screen, Ur:  Negative (10/10/2019),     MDMA-Ecstasy:  Negative (10/10/2019),     Met-Methamphetamine:  Negative (10/10/2019),     MTD-Methadone Screen, Urine:  Negative (10/10/2019),     Opiate Screen, Urine:  Negative (10/10/2019),     OXY-Oxycodone:  Negative (10/10/2019),     PCP-Phencyclidine Screen, Uri:  Negative (10/10/2019),     THC Cannabinoids Screen, Urin:  Negative (10/10/2019),     Urine temperature:  confirmed (10/10/2019),     Date and time of last pill:  lorazepam- 10/10/19 @ 12am/ezekiel (10/10/2019),     Performed by:  tls (10/10/2019),     Collection Time:  1330 (10/10/2019),             ASSESSMENT           307.41   F51.01  Insomnia              DDx:     V58.69   Z79.899  Use of high risk medications              DDx:     300.02   F41.9  Anxiety              DDx:     726.5   M70.72  Hip bursitis              DDx:     724.2   M54.5  Lower back pain              DDx:     401.1   I10  Hypertension              DDx:     840.4   S43.422D  Rotator cuff tear              DDx:          ORDERS:         Radiology/Test Orders:       3014F  Screening mammography results documented and reviewed (PV)1  (In-House)         3017F  Colorectal CA screen results documented and reviewed (PV)  (In-House)         [id number]  Left radiologic exam, hip, unilateral; complete, minimum of two views  (Send-Out)           Lab Orders:       [id number]  [facility]  (Send-Out)         94416  Drug test prsmv read direct optical obs pr date  (In-House)         APPTO  Appointment need  (In-House)         83025  COMP [facility] Comp. Metabolic Panel  (Send-Out)           Procedures Ordered:       REFER  Referral to Specialist or Other Facility  (Send-Out)                   PLAN:          Insomnia She is stable on her current regimen.  Insomnia is well controlled.  No adverse effects.  She does require ongoing use of this controlled substance to function.  Tox screen and Kobi run today.  No refills needed today.  RTC 3 months.     MIPS Vaccines Flu and Pneumonia updated in Shot record Screening mammomgram done within last 2 years and results in are chart Colorectal Cancer Screening is up to date and the results are in the chart     FOLLOW-UP: Schedule a follow-up visit in 3 months.:.            Orders:       3014F  Screening mammography results documented and reviewed (PV)1  (In-House)         3017F  Colorectal CA screen results documented and reviewed (PV)  (In-House)         APPTO  Appointment need  (In-House)            Use of high risk medications     Controlled substance documentation: Kobi reviewed; drug screen performed and appropriate; consent is reviewed and signed and on the chart.  She is aware of risk of addiction on this medication, understands that she will need to follow up for a review every 3 months and her medications will be adjusted or decreased as deemed appropriate at each visit.  No history of drug or alcohol abuse.  No concerns about diversion or abuse. She denies side  effects related to the medication.  She is aware that she may be called in for pill counts.  The dosing of this medication will be reviewed on a regular basis and reduced if possible..  Ongoing use of a controlled substance is necessary for this patient to have a normal quality of life           Orders:       [id number]  Drug test prsmv read direct optical obs pr date  (In-House)            Anxiety Stable on escitalopram.  No refills needed.          Hip bursitis +TTP directly over L tronchanteric bursa today.  Referral placed to Dr. Dela Cruz whom she already sees for evaluation of hip.  XR ordered today.         RADIOLOGY:  I have ordered a left hip x-ray to be done today.      REFERRALS:  Referral initiated to an orthopedist ( Dr. Darshan Dela Cruz; for evaluation of L hip pain, ?tronchanteric bursitis ).  ALREADY A PT BUT THIS IS A NEW DX; NEEDS APPT PLEASE           Orders:       [id number]  Left radiologic exam, hip, unilateral; complete, minimum of two views  (Send-Out)         REFER  Referral to Specialist or Other Facility  (Send-Out)            Lower back pain UA neg.           Orders:       [id number]  [facility] Urinalysis, automated, with micro  (Send-Out)            Hypertension BP at goal.  No refills needed.  Checking labs.     LABORATORY:  Labs ordered to be performed today include Comprehensive metabolic panel.            Orders:       [id number]  [facility] Comp. Metabolic Panel  (Send-Out)               Patient Recommendations:        For  Insomnia:     Schedule a follow-up visit in 3 months.                APPOINTMENT INFORMATION:        Monday Tuesday Wednesday Thursday Friday Saturday Sunday            Time:___________________AM  PM   Date:_____________________             CHARGE CAPTURE           **Please note: ICD descriptions below are intended for billing purposes only and may not represent clinical diagnoses**        Primary Diagnosis:         307.41 Insomnia            F51.01    Primary insomnia               Orders:          89623   Office/outpatient visit; established patient, level 4  (In-House)             3014F   Screening mammography results documented and reviewed (PV)1  (In-House)             3017F   Colorectal CA screen results documented and reviewed (PV)  (In-House)             APPTO   Appointment need  (In-House)           V58.69 Use of high risk medications            Z79.899    Other long term (current) drug therapy              Orders:          [id number]   Drug test prsmv read direct optical obs pr date  (In-House)           300.02 Anxiety            F41.9    Anxiety disorder, unspecified    726.5 Hip bursitis            M70.72    Other bursitis of hip, left hip    724.2 Lower back pain            M54.5    Low back pain    401.1 Hypertension            I10    Essential (primary) hypertension    840.4 Rotator cuff tear            S43.422D    Sprain of left rotator cuff capsule, subsequent encounter

## 2021-05-18 NOTE — PROGRESS NOTES
"Yanni Huynh  1945     Office/Outpatient Visit    Visit Date: Tue, Jan 26, 2021 02:37 pm    Provider: Maria E Melgar MD (Assistant: Josef Diggs, )    Location: [facility]        Electronically signed by Maria E Melgar MD on  01/26/2021 04:03:37 PM                             Subjective:        CC: Mrs. Huynh is a 75 year old White female.  presents today due to low back pain x1 week, urinary urgency         HPI: Yanni is here today with complaint of low back pain for the past 1 week.  A/w urinary urgency, but no frequency.  No dysuria or hematuria.  No abdominal pain.  \"Mild twinges\" in flank pain.  No F/C, no N/V.  She has low back pain R>L for the past week, worsened this morning.  Mild incontinence.  This is not really new.  She was stepping up too high on Saturday to get into an SUV.  She thinks she might have tweaked her back when she was trying to do that.  Twisting or bending makes the back pain worse.        She just had a course of Keflex for cellulitis.    ROS:     CONSTITUTIONAL:  Negative for chills and fever.      CARDIOVASCULAR:  Negative for chest pain.      RESPIRATORY:  Positive for chronic cough.   Negative for recent cough or dyspnea.      GASTROINTESTINAL:  Negative for abdominal pain, constipation, diarrhea, nausea and vomiting.      GENITOURINARY:  Positive for urinary incontinence and urgency.   Negative for dysuria, hematuria or frequent urination.      MUSCULOSKELETAL:  Positive for arthralgias and back pain.          Past Medical History / Family History / Social History:         Last Reviewed on 1/26/2021 03:08 PM by Maria E Melgar    Past Medical History:                 PAST MEDICAL HISTORY         Hypertension     keratoconus s/p 3 cornea transplants         PREVENTIVE HEALTH MAINTENANCE             BONE DENSITY: was last done 7/15/2020 with the following abnormality noted-- osteopenia     COLORECTAL CANCER SCREENING: Up to date (colonoscopy q10y; " sigmoidoscopy q5y; Cologuard q3y) was last done 11/13/17, Results are in chart; colonoscopy with normal results; The next colonoscopy is due  11/2027; diverticulosis and hemorrhoids     EYE EXAM: was last done 03/2019 Dr Eng     MAMMOGRAM: Done within last 2 years and results in are chart was last done 5/1/2018 with normal results     PAP SMEAR: No longer indicated due to age and history hysterectomy         PAST MEDICAL HISTORY             CURRENT MEDICAL PROVIDERS:    Dentist: Dr lange    General Surgeon: Apryl Denton    Ophthalmologist: Dr. Gallardo/Dr. Aguiar         PAST MEDICAL HISTORY                 ADVANCED DIRECTIVES: None         Surgical History:         Cholecystectomy     Hernia Repair: ventral hernia;     Hysterectomy: total, d/t endometriosis;     cornea transplant x 3;    colectomy (14 in) d/t diverticulosis;    R ankle ORIF;         Family History:     Father: Lung Cancer ( dec at age 54 )     Mother: Coronary Artery Disease         Social History:     Occupation: Homemaker    . Retired     Marital Status:      Children: 1 child         Tobacco/Alcohol/Supplements:     Last Reviewed on 1/26/2021 03:08 PM by Maria E Melgar    Tobacco: She has never smoked.  Non-drinker     Caffeine:  She admits to consuming caffeine via tea ( -occassionally, not often ).          Substance Abuse History:     Last Reviewed on 1/26/2021 03:08 PM by Maria E Melgar        Mental Health History:     Last Reviewed on 1/26/2021 03:08 PM by Maria E Melgar        Communicable Diseases (eg STDs):     Last Reviewed on 1/26/2021 03:08 PM by Maria E Melgar        Current Problems:     Last Reviewed on 11/10/2020 04:28 PM by Kacie Vincent    Anxiety disorder, unspecified    Primary insomnia    HTN - Essential (primary) hypertension    Sprain of left rotator cuff capsule, subsequent encounter    Other long term (current) drug therapy    History of falling    Low back pain    Pain in right knee    Contusion of  right knee    Allergic rhinitis, unspecified    Laryngeal spasm    Asymptomatic menopausal state    Impacted cerumen, bilateral    Acute suppurative otitis media without spontaneous rupture of ear drum, left ear    Acute frontal sinusitis, unspecified    Cough        Immunizations:     None        Allergies:     Last Reviewed on 11/10/2020 04:28 PM by Kacie Vincent    No Known Allergies.        Current Medications:     Last Reviewed on 11/10/2020 04:29 PM by Kacie Vincent    Cortisol manager supplement one daily     AREDS eye supplement daily     Calcium 500mg/Vitamin D 400IU supplement PRN     Ibuprofen 200 mg oral tablet [2 tab bid PRN]    atenolol 25 mg oral tablet [TAKE ONE TABLET BY MOUTH EVERY MORNING AND THEN TAKE 1/4 TABLET AT NIGHT]    escitalopram oxalate 10 mg oral tablet [Take 1/2 (one-half) tablet by mouth once daily]    LORazepam 1 mg oral tablet [TAKE 1 TABLET BY MOUTH ONCE DAILY AT BEDTIME AS NEEDED]    Pilocarpine HCl 1 % ophthalmic (eye) Drops [Instill one drop in left eye at HS]    melatonin 3 mg oral tablet [ 1/2 po qhs]    Fluorometholone 0.1 % ophthalmic (eye) Drops, Suspension [instill 1 drop each eye QD]    fluticasone propionate 50 mcg/actuation Intranasal Spray, Suspension [inhale 1 spray (50 mcg) in each nostril by intranasal route once daily]    Augmentin 875-125 mg oral tablet [take 1 tablet by oral route every 12 hours for 10 days]        Objective:        Vitals:         Current: 1/26/2021 2:44:43 PM    Ht:  5 ft, 1.5 in;  Wt: 201.2 lbs;  BMI: 37.4T: 96.3 F (temporal);  BP: 155/56 mm Hg (left arm, sitting);  P: 56 bpm (left arm (BP Cuff), sitting);  sCr: 0.65 mg/dL;  GFR: 79.90        Exams:     PHYSICAL EXAM:     GENERAL: vital signs recorded - well developed, well nourished;  well groomed;  no apparent distress;     EYES: extraocular movements intact; conjunctiva and cornea are normal; right coloboma;     RESPIRATORY: normal respiratory rate and pattern with no distress; normal  breath sounds with no rales, rhonchi, wheezes or rubs;     CARDIOVASCULAR: normal rate; rhythm is regular;  no systolic murmur; no edema;     GASTROINTESTINAL: nontender; normal bowel sounds;     MUSCULOSKELETAL: normal gait; normal overall tone No CVA tenderness +R lumbar paraspinal TTP        Assessment:         M54.5   Low back pain           ORDERS:         Meds Prescribed:       [New Rx] cyclobenzaprine 5 mg oral tablet [take 1 tablet (5 mg) by oral route QHS PRN], #15 (fifteen) tablets, Refills: 0 (zero)         Lab Orders:       [id number]  [facility] - [facility] Urinalysis, automated, with micro  (Send-Out)                      Plan:         Low back painUA completely clear today.  This seems like more of a MSK issue than urinary.  Treating with Flexeril which she has used to good effect in the past.  DO NOT TAKE TOGETHER WITH LORAZEPAM.  Gentle stretching, alternate heat and ice, OTC analgesics prn.  She has f/u with me already scheduled in 1 month.          Prescriptions:       [New Rx] cyclobenzaprine 5 mg oral tablet [take 1 tablet (5 mg) by oral route QHS PRN], #15 (fifteen) tablets, Refills: 0 (zero)           Orders:       [id number]  [facility] - [facility] Urinalysis, automated, with micro  (Send-Out)                  Charge Capture:         Primary Diagnosis:     M54.5  Low back pain           Orders:      [id number]  Office/outpatient visit; established patient, level 3  (In-House)

## 2021-05-18 NOTE — PROGRESS NOTES
Yanni Huynh 1945     Office/Outpatient Visit    Visit Date: Wed, Feb 28, 2018 09:57 am    Provider: Maria E Melgar MD (Assistant: Soha Cuellar MA)    Location: [facility]        Electronically signed by Maria E Melgar MD on  02/28/2018 12:39:31 PM                             SUBJECTIVE:        CC:     Mrs. Huynh is a 72 year old White female.  Patient is here for routine check up and medication refills.; anxiety, insomnia, HTN         HPI: Yanni is here today for routine follow-up of chronic issues.        She is on escitalopram for anxiety.  Anxiety has been well controlled.  No adverse effects with the medication.        She is on lorazepam and melatonin for insomnia.  She has been on this regimen for quite some time, using lorazepam in particular for years now.  She saw Dr. Demetrius Romero PhD in 2015 who did work with her to significantly decrease the amount of medication she was using.  She now takes around 1 tab of lorazepam 1 mg per night.  She has tried trazodone and Ambien in the past but had intolerable side effects with both of these.        She is on atenolol for HTN.  BP has been well controlled.  No CP, palpitations, or SOB.         She is on ibuprofen and topical diclofenac for torn rotator cuff L shoulder.  She is following with Dr. Dela Cruz for that.     ROS:     CONSTITUTIONAL:  Negative for fatigue and fever.      EYES:  Negative for blurred vision.      CARDIOVASCULAR:  Negative for chest pain and palpitations.      RESPIRATORY:  Negative for recent cough and dyspnea.      GASTROINTESTINAL:  Negative for abdominal pain, constipation, diarrhea, nausea and vomiting.      MUSCULOSKELETAL:  Positive for arthralgias (L shoulder - rotator cuff tear, following with Dr. Dela Cruz; also R shoulder).   Negative for myalgias.      NEUROLOGICAL:  Negative for paresthesias and weakness.      PSYCHIATRIC:  Negative for anxiety, depression and doing well on meds.          PMH/FMH/SH:      Last Reviewed on 2/28/2018 10:17 AM by Maria E Melgar    Past Medical History:                 PAST MEDICAL HISTORY         Hypertension     keratoconus s/p 3 cornea transplants         PREVENTIVE HEALTH MAINTENANCE             COLORECTAL CANCER SCREENING: Up to date (colonoscopy q10y; sigmoidoscopy q5y; Cologuard q3y) was last done 11/13/17, Results are in chart; colonoscopy with normal results; The next colonoscopy is due  11/2027; diverticulosis and hemorrhoids     MAMMOGRAM: Done within last 2 years and results in are chart was last done 4/18/17 with normal results         Surgical History:         Cholecystectomy      Hernia Repair: ventral hernia;     Hysterectomy: total, d/t endometriosis;     cornea transplant x 3;    colectomy (14 in) d/t diverticulosis;    R ankle ORIF;         Family History:     Father: Lung Cancer ( dec at age 54 )     Mother: Coronary Artery Disease         Social History:     Occupation: Homemaker    . Retired     Marital Status:      Children: 1 child         Tobacco/Alcohol/Supplements:     Last Reviewed on 2/28/2018 10:17 AM by Maria E Melgar    Tobacco: She has never smoked.          Substance Abuse History:     Last Reviewed on 2/28/2018 10:17 AM by Maria E Melgar        Mental Health History:     Last Reviewed on 2/28/2018 10:17 AM by Maria E Melgar        Communicable Diseases (eg STDs):     Last Reviewed on 2/28/2018 10:17 AM by Maria E Melgar            Current Problems:     Last Reviewed on 11/30/2017 10:21 AM by Maria E Melgar    Screening for cancer of colon     Hypertension     Use of high risk medications     Anxiety     Low back pain     Rotator cuff tear     Insomnia         Immunizations:     None        Allergies:     Last Reviewed on 2/28/2018 10:06 AM by Soha Cuellar      No Known Drug Allergies.         Current Medications:     Last Reviewed on 2/28/2018 10:06 AM by Soha Cuellar    Lorazepam 1mg Tablet 1 tab Q HS PRN     Escitalopram Oxalate  10mg Tablet 1/2 tab daily     Diclofenac Sodium 1% Topical Gel apply 2-4grams QID PRN for pain     Proctosol-HC 2.5% Cream prn for hemmorroids     AREDS eye supplement daily     Calcium 500mg/Vitamin D 400IU supplement PRN     Cortisol manager supplement one daily     Melatonin 3mg Tablet 1 po qhs     Fluorometholone 0.1% Ophthalmic Suspension instill 1 drop each eye QD     Ibuprofen 200mg Tablet 2 tab bid PRN     Atenolol 25mg Tablet Take one tablet by mouth in the morning and 1/4 of a tablet at night     Pilocarpine HCl 1% Ophthalmic Solution Instill one drop in left eye at HS         OBJECTIVE:        Vitals:         Current: 2/28/2018 10:00:34 AM    Ht:  5 ft, 1.5 in;  Wt: 203.8 lbs;  BMI: 37.9    T: 98.5 F (oral);  BP: 141/69 mm Hg (left arm, sitting);  P: 56 bpm (left arm (BP Cuff), sitting)        Repeat:     10:33:55 AM     BP:   130/80mm Hg (left arm, sitting)         Exams:     PHYSICAL EXAM:     GENERAL: vital signs recorded - well developed, well nourished;  well groomed;  no apparent distress;     EYES: extraocular movements intact; conjunctiva and cornea are normal; right coloboma;     E/N/T: OROPHARYNX:  normal mucosa, dentition, gingiva, and posterior pharynx;     RESPIRATORY: normal respiratory rate and pattern with no distress; normal breath sounds with no rales, rhonchi, wheezes or rubs;     CARDIOVASCULAR: normal rate; rhythm is regular;  no systolic murmur; no edema;     GASTROINTESTINAL: nontender; normal bowel sounds;     MUSCULOSKELETAL: normal gait; normal overall tone     PSYCHIATRIC: appropriate affect and demeanor; normal psychomotor function; normal speech pattern;         Lab/Test Results:             Amphetamines Screen, Urin:  Negative (02/28/2018),     BAR-Barbiturates Screen, Urin:  Negative (02/28/2018),     Buprenorphine:  Negative (02/28/2018),     BZO-Benzodiazepines Screen,Ur:  Positive (02/28/2018),     Cocaine(Metab.)Screen, Ur:  Negative (02/28/2018),     MDMA-Ecstasy:   Negative (02/28/2018),     Met-Methamphetamine:  Negative (02/28/2018),     MTD-Methadone Screen, Urine:  Negative (02/28/2018),     Opiate Screen, Urine:  Negative (02/28/2018),     OXY-Oxycodone:  Negative (02/28/2018),     PCP-Phencyclidine Screen, Uri:  Negative (02/28/2018),     THC Cannabinoids Screen, Urin:  Negative (02/28/2018),     Urine temperature:  confirmed (02/28/2018),     Date and time of last pill:  Lorazepam 2/27/18@1am (02/28/2018),     Performed by:  tls (02/28/2018),     Collection Time:  10:40 (02/28/2018),             ASSESSMENT           300.02   F41.9  Anxiety              DDx:     307.41   F51.01  Insomnia              DDx:     V58.69   Z79.899  Use of high risk medications              DDx:     401.1   I10  Hypertension              DDx:         ORDERS:         Lab Orders:       [id number]  Drug test prsmv qual dir optical obs per day  (In-House)                   PLAN:          Anxiety Stable on escitalopram.  Mood and anxiety both are doing quite well.  No refills needed today.  RTC 4 months for AWV.         FOLLOW-UP: Schedule a follow-up visit in 4 months..  AWV           Patient Education Handouts:       [facility] Medication Compliance           Insomnia Stable on current regimen of lorazepam and melatonin.  She has been on this for years now.  She really can't sleep without it.  No adverse effects.  She does require ongoing use of this controlled substance to function.  Refills have already been sent, but I am going to have team 5 call the pharmacy and add on 2 more refills to bring the total to 3 so she will be covered through my maternity leave.  I will see her back once I return.  Tox screen and Kobi run today.          Use of high risk medications         FOLLOW-UP: Schedule a follow-up visit in 4 months..  Controlled substance documentation: Kobi reviewed; drug screen performed and appropriate; consent is reviewed and signed and on the chart.  She is aware of risk of addiction on  this medication, understands that she will need to follow up for a review every 3 months and her medications will be adjusted or decreased as deemed appropriate at each visit.  No history of drug or alcohol abuse.  No concerns about diversion or abuse. She denies side effects related to the medication.  She is aware that she may be called in for pill counts.  The dosing of this medication will be reviewed on a regular basis and reduced if possible..  Ongoing use of a controlled substance is necessary for this patient to have a normal quality of life Drug screen           Orders:       [id number]  Drug test prsmv qual dir optical obs per day  (In-House)            Hypertension BP at goal.  No refills needed today.  RTC 4 months and plan to check labs at that time.             Patient Recommendations:        For  Anxiety:     Schedule a follow-up visit in 4 months.                APPOINTMENT INFORMATION:        Monday Tuesday Wednesday Thursday Friday Saturday Sunday            Time:___________________AM  PM   Date:_____________________         For  Use of high risk medications:     Schedule a follow-up visit in 4 months.                APPOINTMENT INFORMATION:        Monday Tuesday Wednesday Thursday Friday Saturday Sunday            Time:___________________AM  PM   Date:_____________________             CHARGE CAPTURE           **Please note: ICD descriptions below are intended for billing purposes only and may not represent clinical diagnoses**        Primary Diagnosis:         300.02 Anxiety            F41.9    Anxiety disorder, unspecified              Orders:          [id number]   Office/outpatient visit; established patient, level 4  (In-House)           307.41 Insomnia            F51.01    Primary insomnia    V58.69 Use of high risk medications            Z79.899    Other long term (current) drug therapy              Orders:          [id number]   Drug test prsmv qual dir optical obs per day  (In-House)            401.1 Hypertension            I10    Essential (primary) hypertension

## 2021-05-18 NOTE — PROGRESS NOTES
"Yanni Huynh 1945     Office/Outpatient Visit    Visit Date: Wed, Sep 19, 2018 01:03 pm    Provider: Maria E Melgar MD (Assistant: Soha Cuellar MA)    Location: [facility]        Electronically signed by Maria E Melgar MD on  09/19/2018 03:04:55 PM                             SUBJECTIVE:        CC:     Mrs. Huynh is a 72 year old White female.  Patient is here for routine check up and medication refills.;         HPI: Yanni is here to f/u on chronic issues.  She is getting ready to go down to [place] for 3 weeks.        She is on escitalopram for anxiety.  Baseline anxiety is pretty well controlled with no anxiety or panic attacks.  She denies adverse effects.  Mood is \"good.\"          She is on lorazepam and melatonin together for insomnia.  She has been on this regimen for years, especially the lorazepam.  She saw Dr. Demetrius Romero PhD in 2015 who did work with her to significantly decrease the amount of medication she was using.  She has been working on cutting this down and she is now down to 1/2 tab of lorazepam 1 mg QHS and \"sometimes I fall asleep with it.\"  She has tried trazodone and Ambien in the past but had intolerable side effects with both of these.        She is on atenolol for HTN.  BP has been well controlled.  No CP, palpitations, or SOB.         She is on ibuprofen and topical diclofenac for torn rotator cuff L shoulder.  She follows with Dr. Dela Cruz.     ROS:     CONSTITUTIONAL:  Negative for fatigue and fever.      EYES:  Negative for blurred vision.      CARDIOVASCULAR:  Negative for chest pain and palpitations.      RESPIRATORY:  Negative for recent cough and dyspnea.      GASTROINTESTINAL:  Negative for abdominal pain, constipation, diarrhea, nausea and vomiting.      MUSCULOSKELETAL:  Positive for arthralgias.   Negative for myalgias.      PSYCHIATRIC:  Negative for anxiety, depression and sleep disturbance.          PMH/FMH/SH:     Last Reviewed on 9/19/2018 01:12 PM by " Maria E Melgar    Past Medical History:                 PAST MEDICAL HISTORY         Hypertension     keratoconus s/p 3 cornea transplants         PREVENTIVE HEALTH MAINTENANCE             BONE DENSITY: was last done 6/28/2018 with the following abnormality noted-- osteopenia     COLORECTAL CANCER SCREENING: Up to date (colonoscopy q10y; sigmoidoscopy q5y; Cologuard q3y) was last done 11/13/17, Results are in chart; colonoscopy with normal results; The next colonoscopy is due  11/2027; diverticulosis and hemorrhoids     EYE EXAM: was last done 3/2018     MAMMOGRAM: Done within last 2 years and results in are chart was last done 5/1/2018 with normal results     PAP SMEAR: No longer indicated due to age and history hysterectomy         PAST MEDICAL HISTORY             CURRENT MEDICAL PROVIDERS:    Dentist: Dr lange    General Surgeon: Apryl Denton    Ophthalmologist: Dr. Gallardo/Dr. Aguiar         PAST MEDICAL HISTORY                 ADVANCED DIRECTIVES: None         Surgical History:         Cholecystectomy      Hernia Repair: ventral hernia;     Hysterectomy: total, d/t endometriosis;     cornea transplant x 3;    colectomy (14 in) d/t diverticulosis;    R ankle ORIF;         Family History:     Father: Lung Cancer ( dec at age 54 )     Mother: Coronary Artery Disease         Social History:     Occupation: Homemaker    . Retired     Marital Status:      Children: 1 child         Tobacco/Alcohol/Supplements:     Last Reviewed on 9/19/2018 01:12 PM by Maria E Melgra    Tobacco: She has never smoked.          Substance Abuse History:     Last Reviewed on 9/19/2018 01:12 PM by Maria E Melgar        Mental Health History:     Last Reviewed on 9/19/2018 01:12 PM by Maria E Melgar        Communicable Diseases (eg STDs):     Last Reviewed on 9/19/2018 01:12 PM by Maria E Melgar            Current Problems:     Last Reviewed on 6/26/2018 10:07 AM by Maria E Melgar    Screening for cancer of colon      Hypertension     Use of high risk medications     Anxiety     Low back pain     Rotator cuff tear     Insomnia         Immunizations:     None        Allergies:     Last Reviewed on 9/19/2018 01:08 PM by Soha uCellar      No Known Drug Allergies.         Current Medications:     Last Reviewed on 9/19/2018 01:08 PM by Soha Cuellar    Atenolol 25mg Tablet Take one tablet by mouth in the morning and 1/4 of a tablet at night     Escitalopram Oxalate 10mg Tablet 1/2 tab daily     Lorazepam 1mg Tablet 1 tab Q HS PRN     AREDS eye supplement daily     Calcium 500mg/Vitamin D 400IU supplement PRN     Cortisol manager supplement one daily     Melatonin 3mg Tablet 1 po qhs     Pilocarpine HCl 1% Ophthalmic Solution Instill one drop in left eye at HS     Fluorometholone 0.1% Ophthalmic Suspension instill 1 drop each eye QD     Ibuprofen 200mg Tablet 2 tab bid PRN         OBJECTIVE:        Vitals:         Current: 9/19/2018 1:07:33 PM    Ht:  5 ft, 1.5 in;  Wt: 213.2 lbs;  BMI: 39.6    T: 98.8 F (oral);  BP: 144/61 mm Hg (left arm, sitting);  P: 63 bpm (left arm (BP Cuff), sitting);  sCr: 0.44 mg/dL;  GFR: 126.52        Exams:     PHYSICAL EXAM:     GENERAL: vital signs recorded - well developed, well nourished;  well groomed;  no apparent distress;     EYES: extraocular movements intact; conjunctiva and cornea are normal; right coloboma;     E/N/T: OROPHARYNX:  normal mucosa, dentition, gingiva, and posterior pharynx;     RESPIRATORY: normal respiratory rate and pattern with no distress; normal breath sounds with no rales, rhonchi, wheezes or rubs;     CARDIOVASCULAR: normal rate; rhythm is regular;  no systolic murmur; no edema;     GASTROINTESTINAL: nontender; normal bowel sounds;     MUSCULOSKELETAL: normal gait; normal overall tone     PSYCHIATRIC: appropriate affect and demeanor; normal psychomotor function; normal speech pattern;         Lab/Test Results:             Amphetamines Screen, Urin:  Negative  (09/19/2018),     BAR-Barbiturates Screen, Urin:  Negative (09/19/2018),     Buprenorphine:  Negative (09/19/2018),     BZO-Benzodiazepines Screen,Ur:  Positive (09/19/2018),     Cocaine(Metab.)Screen, Ur:  Negative (09/19/2018),     MDMA-Ecstasy:  Negative (09/19/2018),     Met-Methamphetamine:  Negative (09/19/2018),     MTD-Methadone Screen, Urine:  Negative (09/19/2018),     Opiate Screen, Urine:  Negative (09/19/2018),     OXY-Oxycodone:  Negative (09/19/2018),     PCP-Phencyclidine Screen, Uri:  Negative (09/19/2018),     THC Cannabinoids Screen, Urin:  Negative (09/19/2018),     Urine temperature:  confirmed (09/19/2018),     Date and time of last pill:  Lorazepam 09- @ 12:45AM ./mlb (09/19/2018),     Performed by:  natty (09/19/2018),     Collection Time:  1335 (09/19/2018),             ASSESSMENT           307.41   F51.01  Insomnia              DDx:     V58.69   Z79.899  Use of high risk medications              DDx:     300.02   F41.9  Anxiety              DDx:     401.1   I10  Hypertension              DDx:         ORDERS:         Meds Prescribed:       Refill of: Escitalopram Oxalate 10mg Tablet 1/2 tab daily  #45 ([facility] [facility]) tablet(s) Refills: 1       Refill of: Atenolol 25mg Tablet Take one tablet by mouth in the morning and 1/4 of a tablet at night  #100 (One [place]) tablet(s) Refills: 1         Lab Orders:       [id number]  Drug test prsmv qual dir optical obs per day  (In-House)         APPTO  Appointment need  (In-House)                   PLAN:          Insomnia Stable on melatonin and lorazepam.  In fact, ever since we discussed the bad long-term effects of lorazepam, she has been working on cutting down her use.  She is now down to 1/2 tab QHS most nights.  No refills needed today.  No adverse effects.  Tox screen and Kobi running today.  RTC 3 months.         FOLLOW-UP: Schedule a follow-up visit in 3 months..  f/u insomnia with [place]           Orders:       APPTO  Appointment need   (In-House)             Patient Education Handouts:       [facility] Medication Compliance           Use of high risk medications     Controlled substance documentation: Kobi reviewed; drug screen performed and appropriate; consent is reviewed and signed and on the chart.  She is aware of risk of addiction on this medication, understands that she will need to follow up for a review every 3 months and her medications will be adjusted or decreased as deemed appropriate at each visit.  No history of drug or alcohol abuse.  No concerns about diversion or abuse. She denies side effects related to the medication.  She is aware that she may be called in for pill counts.  The dosing of this medication will be reviewed on a regular basis and reduced if possible..  Ongoing use of a controlled substance is necessary for this patient to have a normal quality of life           Orders:       21001  Drug test prsmv qual dir optical obs per day  (In-House)            Anxiety Stable. Refills sent.           Prescriptions:       Refill of: Escitalopram Oxalate 10mg Tablet 1/2 tab daily  #45 ([facility]) tablet(s) Refills: 1          Hypertension BP at goal.  Refills sent.           Prescriptions:       Refill of: Atenolol 25mg Tablet Take one tablet by mouth in the morning and 1/4 of a tablet at night  #100 (One [place]) tablet(s) Refills: 1             Patient Recommendations:        For  Insomnia:     Schedule a follow-up visit in 3 months.                APPOINTMENT INFORMATION:        Monday Tuesday Wednesday Thursday Friday Saturday Sunday            Time:___________________AM  PM   Date:_____________________             CHARGE CAPTURE           **Please note: ICD descriptions below are intended for billing purposes only and may not represent clinical diagnoses**        Primary Diagnosis:         307.41 Insomnia            F51.01    Primary insomnia              Orders:          76710   Office/outpatient visit; established  patient, level 4  (In-House)             APPTO   Appointment need  (In-House)           V58.69 Use of high risk medications            Z79.899    Other long term (current) drug therapy              Orders:          78421   Drug test prsmv qual dir optical obs per day  (In-House)           300.02 Anxiety            F41.9    Anxiety disorder, unspecified    401.1 Hypertension            I10    Essential (primary) hypertension

## 2021-05-18 NOTE — PROGRESS NOTES
"Yanni Huynh 1945     Office/Outpatient Visit    Visit Date: Tue, Mar 19, 2019 11:22 am    Provider: Maria E Melgar MD (Assistant: Brittney Tavares MA)    Location: [facility]        Electronically signed by Maria E Melgar MD on  03/19/2019 12:51:10 PM                             SUBJECTIVE:        CC:     Mrs. Huynh is a 73 year old White female.  This is a follow-up visit.  Patient presents today for three month follow up;         HPI: Yanni is here today to follow up on chronic issues.        She is on escitalopram for anxiety, and it has been working well.  Denies anxiety or panic attacks.  Mood is \"good.\"  No adverse effects.  She is on lorazepam and melatonin both for insomnia.  She has been on the lorazepam for many years.  She saw Dr. Demetrius Romero PhD in 2015 who did work with her to significantly decrease the amount of medication she was using, but was unable to get off of it altogether.  She has tried trazodone and Ambien in the past but had intolerable side effects with both of these.        She is on atenolol for HTN.  BP has been well controlled.  No CP, palpitations, or SOB.         She is on ibuprofen and topical diclofenac for torn rotator cuff L shoulder.  She follows with Dr. Dela Cruz.  She is now having pain in the R shoulder as well.  Worst at night.  She takes 2 tabs of ibuprofen 200 mg daily.      ROS:     CONSTITUTIONAL:  Negative for fatigue and fever.      EYES:  Negative for blurred vision.      E/N/T:  Negative for nasal congestion and frequent rhinorrhea.      CARDIOVASCULAR:  Negative for chest pain and palpitations.      RESPIRATORY:  Positive for chronic cough.   Negative for recent cough or dyspnea.      GASTROINTESTINAL:  Negative for abdominal pain, constipation, diarrhea, nausea and vomiting.      MUSCULOSKELETAL:  Positive for arthralgias (bilateral shoulders).   Negative for myalgias.      PSYCHIATRIC:  Negative for anxiety, depression and sleep disturbance.  "         PMH/FMH/SH:     Last Reviewed on 3/19/2019 11:31 AM by Maria E Melgar    Past Medical History:                 PAST MEDICAL HISTORY         Hypertension     keratoconus s/p 3 cornea transplants         PREVENTIVE HEALTH MAINTENANCE             BONE DENSITY: was last done 6/28/2018 with the following abnormality noted-- osteopenia     COLORECTAL CANCER SCREENING: Up to date (colonoscopy q10y; sigmoidoscopy q5y; Cologuard q3y) was last done 11/13/17, Results are in chart; colonoscopy with normal results; The next colonoscopy is due  11/2027; diverticulosis and hemorrhoids     EYE EXAM: was last done 3/2018     MAMMOGRAM: Done within last 2 years and results in are chart was last done 5/1/2018 with normal results     PAP SMEAR: No longer indicated due to age and history hysterectomy         PAST MEDICAL HISTORY             CURRENT MEDICAL PROVIDERS:    Dentist: Dr lange    General Surgeon: Apryl Denton    Ophthalmologist: Dr. Gallardo/Dr. Aguiar         PAST MEDICAL HISTORY                 ADVANCED DIRECTIVES: None         Surgical History:         Cholecystectomy      Hernia Repair: ventral hernia;     Hysterectomy: total, d/t endometriosis;     cornea transplant x 3;    colectomy (14 in) d/t diverticulosis;    R ankle ORIF;         Family History:     Father: Lung Cancer ( dec at age 54 )     Mother: Coronary Artery Disease         Social History:     Occupation: Homemaker    . Retired     Marital Status:      Children: 1 child         Tobacco/Alcohol/Supplements:     Last Reviewed on 3/19/2019 11:31 AM by Maria E Melgar    Tobacco: She has never smoked.          Substance Abuse History:     Last Reviewed on 3/19/2019 11:31 AM by Maria E Melgar        Mental Health History:     Last Reviewed on 3/19/2019 11:31 AM by Maria E Melgar        Communicable Diseases (eg STDs):     Last Reviewed on 3/19/2019 11:31 AM by Maria E Melgar            Current Problems:     Last Reviewed on 12/19/2018  02:56 PM by Maria E Melgar    Screening for cancer of colon     Hypertension     Use of high risk medications     Anxiety     Rotator cuff tear     Insomnia         Immunizations:     None        Allergies:     Last Reviewed on 12/19/2018 02:56 PM by Maria E Melgar      No Known Drug Allergies.         Current Medications:     Last Reviewed on 12/19/2018 02:56 PM by Maria E Melgar    Lorazepam 1mg Tablet 1 tab Q HS PRN     Atenolol 25mg Tablet Take one tablet by mouth in the morning and 1/4 of a tablet at night     Escitalopram Oxalate 10mg Tablet 1/2 tab daily     AREDS eye supplement daily     Calcium 500mg/Vitamin D 400IU supplement PRN     Cortisol manager supplement one daily     Melatonin 3mg Tablet 1 po qhs     Pilocarpine HCl 1% Ophthalmic Solution Instill one drop in left eye at HS     Fluorometholone 0.1% Ophthalmic Suspension instill 1 drop each eye QD     Ibuprofen 200mg Tablet 2 tab bid PRN         OBJECTIVE:        Vitals:         Current: 3/19/2019 11:27:45 AM    Ht:  5 ft, 1.5 in;  Wt: 209.4 lbs;  BMI: 38.9    T: 98.2 F (oral);  BP: 146/60 mm Hg (left arm, sitting);  P: 63 bpm (left arm (BP Cuff), sitting);  sCr: 0.69 mg/dL;  GFR: 78.89        Repeat:     11:51:13 AM     BP:   141/58mm Hg (left arm, sitting)         Exams:     PHYSICAL EXAM:     GENERAL: vital signs recorded - well developed, well nourished;  well groomed;  no apparent distress;     EYES: extraocular movements intact; conjunctiva and cornea are normal; right coloboma;     E/N/T: OROPHARYNX: oral mucosa is normal; posterior pharynx shows cobblestone appearance and no exudate;     RESPIRATORY: normal respiratory rate and pattern with no distress; normal breath sounds with no rales, rhonchi, wheezes or rubs;     CARDIOVASCULAR: normal rate; rhythm is regular;  no systolic murmur; no edema;     GASTROINTESTINAL: nontender; normal bowel sounds;     MUSCULOSKELETAL: normal gait; normal overall tone     PSYCHIATRIC: appropriate affect  and demeanor; normal psychomotor function; normal speech pattern;         Lab/Test Results:             Amphetamines Screen, Urin:  Negative (03/19/2019),     BAR-Barbiturates Screen, Urin:  Negative (03/19/2019),     Buprenorphine:  Negative (03/19/2019),     BZO-Benzodiazepines Screen,Ur:  Positive (03/19/2019),     Cocaine(Metab.)Screen, Ur:  Negative (03/19/2019),     MDMA-Ecstasy:  Negative (03/19/2019),     Met-Methamphetamine:  Negative (03/19/2019),     MTD-Methadone Screen, Urine:  Negative (03/19/2019),     Opiate Screen, Urine:  Negative (03/19/2019),     OXY-Oxycodone:  Negative (03/19/2019),     PCP-Phencyclidine Screen, Uri:  Negative (03/19/2019),     THC Cannabinoids Screen, Urin:  Negative (03/19/2019),     Urine temperature:  confirmed (03/19/2019),     Date and time of last pill:  Lorazepam 03/19/2019 @ 1200am/AS (03/19/2019),     Performed by:  tls (03/19/2019),     Collection Time:  1145 (03/19/2019),             ASSESSMENT           307.41   F51.01  Insomnia              DDx:     V58.69   Z79.899  Use of high risk medications              DDx:     300.02   F41.9  Anxiety              DDx:     401.1   I10  Hypertension              DDx:     840.4   S43.422D  Rotator cuff tear              DDx:         ORDERS:         Meds Prescribed:       Refill of: Atenolol 25mg Tablet Take one tablet by mouth in the morning and 1/4 of a tablet at night  #100 (One [place]) tablet(s) Refills: 1         Radiology/Test Orders:       3014F  Screening mammography results documented and reviewed (PV)1  (In-House)         3017F  Colorectal CA screen results documented and reviewed (PV)  (In-House)           Lab Orders:       [id number]  Drug test prsmv qual dir optical obs per day  (In-House)         APPTO  Appointment need  (In-House)         22069  HTNLP - [facility] CMP AND LIPID: [id number], [id number]  (Send-Out)           Other Orders:         Calculated BMI above the upper parameter and a follow-up plan was documented in the  medical record  (In-House)                   PLAN:          Insomnia Stable on lorazepam and melatonin and has been for years.  She has not been successful at cutting down any more than her current level.  She has been to sleep CBT.  No adverse effects.  She is sleeping well.  Tox screen and Kobi run today.  RTC 3 months for AWV.     MIPS Vaccines Flu and Pneumonia updated in Shot record     MAMMOGRAM: Done within last 2 years and results in are chart     COLORECTAL CANCER SCREENING: Results are in chart     BMI Elevated - Follow-Up Plan: She was provided education on weight loss strategies     FOLLOW-UP: Schedule a follow-up visit in 3 months..  Medicare wellness 30 min with Henryluis fernando           Orders:       3014F  Screening mammography results documented and reviewed (PV)1  (In-House)         3017F  Colorectal CA screen results documented and reviewed (PV)  (In-House)           Calculated BMI above the upper parameter and a follow-up plan was documented in the medical record  (In-House)         APPTO  Appointment need  (In-House)             Patient Education Handouts:       [facility] [place] Medication Compliance           Use of high risk medications     Controlled substance documentation: Kobi reviewed; drug screen performed and appropriate; consent is reviewed and signed and on the chart.  She is aware of risk of addiction on this medication, understands that she will need to follow up for a review every 3 months and her medications will be adjusted or decreased as deemed appropriate at each visit.  No history of drug or alcohol abuse.  No concerns about diversion or abuse. She denies side effects related to the medication.  She is aware that she may be called in for pill counts.  The dosing of this medication will be reviewed on a regular basis and reduced if possible..  Ongoing use of a controlled substance is necessary for this patient to have a normal quality of life           Orders:       78589  Drug test  "prsmv qual dir optical obs per day  (In-House)            Anxiety Stable on Lexapro.  Mood has been good.          Hypertension BP is at goal.  Refills sent.  Checking labs.     LABORATORY:  Labs ordered to be performed today include HTN/Lipid Panel: CMP, Lipid.            Prescriptions:       Refill of: Atenolol 25mg Tablet Take one tablet by mouth in the morning and 1/4 of a tablet at night  #100 (One [place]) tablet(s) Refills: 1           Orders:       85509  HTN - [facility] CMP AND LIPID: [id number], [id number]  (Send-Out)            Rotator cuff tear Having a lot of bilateral shoulder pain. She has not been back to see Dr. Dela Cruz for 8 months because he wants to do shoulder replacement \"and I don't want him to know he's right.\"  Discussed that this is her decision, just based on the amount of pain she was having.             Patient Recommendations:        For  Insomnia:     Schedule a follow-up visit in 3 months.                APPOINTMENT INFORMATION:        Monday Tuesday Wednesday Thursday Friday Saturday Sunday            Time:___________________AM  PM   Date:_____________________             CHARGE CAPTURE           **Please note: ICD descriptions below are intended for billing purposes only and may not represent clinical diagnoses**        Primary Diagnosis:         307.41 Insomnia            F51.01    Primary insomnia              Orders:          97415   Office/outpatient visit; established patient, level 4  (In-House)             3014F   Screening mammography results documented and reviewed (PV)1  (In-House)             3017F   Colorectal CA screen results documented and reviewed (PV)  (In-House)                Calculated BMI above the upper parameter and a follow-up plan was documented in the medical record  (In-House)             APPTO   Appointment need  (In-House)           V58.69 Use of high risk medications            Z79.899    Other long term (current) drug therapy              Orders:          " 96215   Drug test prsmv qual dir optical obs per day  (In-House)           300.02 Anxiety            F41.9    Anxiety disorder, unspecified    401.1 Hypertension            I10    Essential (primary) hypertension    840.4 Rotator cuff tear            S43.422D    Sprain of left rotator cuff capsule, subsequent encounter

## 2021-05-18 NOTE — PROGRESS NOTES
Yanni Huynh 1945     Office/Outpatient Visit    Visit Date: Mon, Sep 24, 2018 01:38 pm    Provider: Natividad Rodgers N.P. (Assistant: Soha Cuellar MA)    Location: [facility]        Electronically signed by Natividad Rodgers N.P. on  09/24/2018 02:59:38 PM                             SUBJECTIVE:        CC:     Mrs. Huynh is a 72 year old White female.  Patient complains of left side pain.;         HPI:         Patient to be evaluated for abdominal pain, other specified site.  This is located primarily in the left flank.  There is some radiation to the left lower quadrant.  It began 2 days ago.  She characterizes it as aching.  She estimates that the frequency of pain is waxes and wanes.  Associated symptoms include diarrhea and sweaty, clammy feeling.  She denies constipation, hematuria or vomiting.  Pertinent medical history includes kidney stones and diverticulosis.  She has been doing intensive cleaning recently as she is getting ready to leave on vacation so entire lower back feels sore.      ROS:     CONSTITUTIONAL:  Negative for chills and fever.      CARDIOVASCULAR:  Negative for chest pain and palpitations.      RESPIRATORY:  Negative for dyspnea and frequent wheezing.      GASTROINTESTINAL:  Positive for abdominal pain ( LLQ; flank ) and diarrhea ( eats prunes for constipation and sometimes over does it ).   Negative for constipation or vomiting.      GENITOURINARY:  Negative for dysuria and frequent urination.      MUSCULOSKELETAL:  Positive for back pain.          PM/[employer]/SH:     Last Reviewed on 9/19/2018 01:12 PM by Maria E Melgar    Past Medical History:                 PAST MEDICAL HISTORY         Hypertension     keratoconus s/p 3 cornea transplants         PREVENTIVE HEALTH MAINTENANCE             BONE DENSITY: was last done 6/28/2018 with the following abnormality noted-- osteopenia     COLORECTAL CANCER SCREENING: Up to date (colonoscopy q10y; sigmoidoscopy q5y; Cologuard q3y)  was last done 11/13/17, Results are in chart; colonoscopy with normal results; The next colonoscopy is due  11/2027; diverticulosis and hemorrhoids     EYE EXAM: was last done 3/2018     MAMMOGRAM: Done within last 2 years and results in are chart was last done 5/1/2018 with normal results     PAP SMEAR: No longer indicated due to age and history hysterectomy         PAST MEDICAL HISTORY             CURRENT MEDICAL PROVIDERS:    Dentist: Dr lange    General Surgeon: Apryl Denton    Ophthalmologist: Dr. Gallardo/Dr. Aguiar         PAST MEDICAL HISTORY                 ADVANCED DIRECTIVES: None         Surgical History:         Cholecystectomy      Hernia Repair: ventral hernia;     Hysterectomy: total, d/t endometriosis;     cornea transplant x 3;    colectomy (14 in) d/t diverticulosis;    R ankle ORIF;         Family History:     Father: Lung Cancer ( dec at age 54 )     Mother: Coronary Artery Disease         Social History:     Occupation: Homemaker    . Retired     Marital Status:      Children: 1 child         Tobacco/Alcohol/Supplements:     Last Reviewed on 9/24/2018 01:39 PM by Soha Cuellar    Tobacco: She has never smoked.          Substance Abuse History:     Last Reviewed on 9/19/2018 01:12 PM by Maria E Melgar        Mental Health History:     Last Reviewed on 9/19/2018 01:12 PM by Maria E Melgar        Communicable Diseases (eg STDs):     Last Reviewed on 9/19/2018 01:12 PM by Maria E Melgar            Current Problems:     Last Reviewed on 9/19/2018 01:12 PM by Maria E Melgar    Screening for cancer of colon     Hypertension     Use of high risk medications     Anxiety     Rotator cuff tear     Insomnia         Immunizations:     None        Allergies:     Last Reviewed on 9/24/2018 01:38 PM by Soha Cuellar      No Known Drug Allergies.         Current Medications:     Last Reviewed on 9/24/2018 01:38 PM by Soha Cuellar    Atenolol 25mg Tablet Take one tablet by mouth in the  morning and 1/4 of a tablet at night     Escitalopram Oxalate 10mg Tablet 1/2 tab daily     Lorazepam 1mg Tablet 1 tab Q HS PRN     AREDS eye supplement daily     Calcium 500mg/Vitamin D 400IU supplement PRN     Cortisol manager supplement one daily     Melatonin 3mg Tablet 1 po qhs     Pilocarpine HCl 1% Ophthalmic Solution Instill one drop in left eye at HS     Fluorometholone 0.1% Ophthalmic Suspension instill 1 drop each eye QD     Ibuprofen 200mg Tablet 2 tab bid PRN         OBJECTIVE:        Vitals:         Current: 9/24/2018 1:42:08 PM    Ht:  5 ft, 1.5 in;  Wt: 210.6 lbs;  BMI: 39.1    T: 98.5 F (oral);  BP: 148/69 mm Hg (left arm, sitting);  P: 65 bpm (left arm (BP Cuff), sitting);  sCr: 0.44 mg/dL;  GFR: 125.86        Exams:     PHYSICAL EXAM:     GENERAL: well developed, well nourished;  no apparent distress;     RESPIRATORY: normal respiratory rate and pattern with no distress; normal breath sounds with no rales, rhonchi, wheezes or rubs;     CARDIOVASCULAR: normal rate; rhythm is regular;     GASTROINTESTINAL: nontender; normal bowel sounds; no organomegaly;     MUSCULOSKELETAL: normal gait; normal range of motion of all major muscle groups; pain with range of motion in: back flexion, extension, and lateral flexion;     NEUROLOGIC: mental status: alert and oriented x 3; GROSSLY INTACT     PSYCHIATRIC: appropriate affect and demeanor;         ASSESSMENT           789.09   R10.12  Abdominal pain, other specified site              DDx:         ORDERS:         Lab Orders:       [id number]  BDUAM - [facility] Urinalysis, automated, with micro  (Send-Out)         83298  AMYS - [facility] Amylase, Serum  (Send-Out)         53506  COMP - [facility] Comp. Metabolic Panel  (Send-Out)         00746  LIP - [facility] Lipase, Serum  (Send-Out)         93519  BDCB2 - [facility] CBC w/o diff  (Send-Out)         44228  URCU - [facility] Urine Culture  (Send-Out)                   PLAN:          Abdominal pain, other specified site Discussed with patient, no blood  in urine which is typically found with kidney stones. With history of diverticulosis, will check labs today with plan to treat if white blood cells elevated. Discussed possibility of this being musculoskeletal related with all of the cleaning that she has been doing recently. Advised heating pad and rest. Further recommendations to follow.     LABORATORY:  Labs ordered to be performed today include amylase, CBC W/O DIFF, Comprehensive metabolic panel, Lipase, and Urine culture.      FOLLOW-UP: Schedule follow-up appointments on a p.r.n. basis. Patient already scheduled for follow-up appointment with PCP           Orders:       54237  BDUAM - [facility] Urinalysis, automated, with micro  (Send-Out)         92150  AMYS - [facility] Amylase, Serum  (Send-Out)         19308  COMP - [facility] Comp. Metabolic Panel  (Send-Out)         59892  LIP - [facility] Lipase, Serum  (Send-Out)         55797  BDCB2 - [facility] CBC w/o diff  (Send-Out)         65910  URCU - [facility] Urine Culture  (Send-Out)               Patient Recommendations:        For  Abdominal pain, other specified site:     Schedule follow-up appointments as needed.              CHARGE CAPTURE           **Please note: ICD descriptions below are intended for billing purposes only and may not represent clinical diagnoses**        Primary Diagnosis:         789.09 Abdominal pain, other specified site            R10.12    Left upper quadrant pain              Orders:          07559   Office/outpatient visit; established patient, level 3  (In-House)

## 2021-05-18 NOTE — PROGRESS NOTES
Yanni Huynh  1945     Office/Outpatient Visit    Visit Date: Fri, Feb 26, 2021 02:37 pm    Provider: Maria E Melgar MD (Assistant: Tamy Ni MA)    Location: [facility]        Electronically signed by Maria E Melgar MD on  02/26/2021 05:11:02 PM                             Subjective:        CC: Annual wellness visit    HPI:       She is UTD on colonoscopy, last done 11/2017 and this was normal.  Ten year repeat recommended.  Pap smear is no longer indicated by age and history; s/p hysterectomy.  She is due for mammogram, last done 5/2018 and this was normal; scheduled with Dr. Apryl Whyte for 2 weeks.  She is UTD on DEXA, last done 7/2020 and this showed osteopenia.  She is due for Pneumovax, Prevnar, Shingrix, Havrix, Td and flu.  She is planning to get her COVID shot.  She is due for routine labs including HTN panel.        She is on lorazepam and melatonin for insomnia.  She has been taking the lorazepam for years, and really cannot sleep without it.  She saw Dr. Demetrius Romero PhD in 2015 who worked with her to decrease the amount she was using, but she was unable to get off of it altogether.  She has tried trazodone and Ambien in the past but had intolerable side effects with both of these.    Mrs. Huynh is here for a Medicare wellness visit.  The required HRA questions are integrated within this visit note. Family medical history and individual medical/surgical history were reviewed and updated.  A current height, weight, BMI, blood pressure, and pulse were recorded in the vitals section of the note and have been reviewed. Patient's medications, including supplements, were recorded in the chart and reviewed.  Current providers and suppliers were reviewed and updated.          Self-Assessment of Health: She rates her health as very good. She rates her confidence of being able to control/manage most of her health problems as very confident. Her physical/emotional health has  limited her social activites not at all.  A review of cognitive impairment was performed, including ability to drive a car, manage finances, and any memory changes, and was found to be negative.  A review of functional ability, including bathing, dressing, walking, and urine/bowel continence as well as level of safety was performed and was found to be negative.  Falls Risk: Has not had any falls or only one fall without injury in the past year.  She denies having trouble hearing the TV/radio when others do not, having to strain to hear or understand conversations and wearing hearing aid(s).  Concerning home safety, she reports that at home she DOES have adequate lighting, a skid resistant shower/tub, grab bars in the bath, handrails on stairs, functioning smoke alarms and absence of throw rugs.          Immunization Status: ** >10 years since last Td booster; ** Has not received pneumococcal vaccination; ** Has not received influenza vaccine for this season; ** Has not received Prevnar 13 vaccination; Age>60, no shingles vaccination; Physical Activity: She never excercises.; Type of diet patient normally eats is described as well-balanced with fruits and vegetables Tobacco: She has never smoked.  Preventative Health updated today           PHQ-9 Depression Screening: Completed form scanned and in chart; Total Score 0     ROS:     CONSTITUTIONAL:  Negative for fatigue and fever.      EYES:  Negative for blurred vision and itching.      E/N/T:  Positive for nasal congestion and frequent rhinorrhea.      CARDIOVASCULAR:  Positive for pedal edema.   Negative for chest pain or palpitations.      RESPIRATORY:  Positive for chronic cough.   Negative for recent cough or dyspnea.      GASTROINTESTINAL:  Negative for abdominal pain, constipation, diarrhea, nausea and vomiting.      GENITOURINARY:  Negative for dysuria and urinary incontinence.      MUSCULOSKELETAL:  Positive for arthralgias and (bilateral shoulders, right  knee) limb pain ( right leg pain ).   Negative for myalgias.      NEUROLOGICAL:  Negative for paresthesias and weakness.      PSYCHIATRIC:  Negative for anxiety, depression and sleep disturbance.          Past Medical History / Family History / Social History:         Last Reviewed on 2/26/2021 03:09 PM by Maria E Melgar    Past Medical History:                 PAST MEDICAL HISTORY         Hypertension     keratoconus s/p 3 cornea transplants         PREVENTIVE HEALTH MAINTENANCE             BONE DENSITY: was last done 7/15/2020 with the following abnormality noted-- osteopenia     COLORECTAL CANCER SCREENING: Up to date (colonoscopy q10y; sigmoidoscopy q5y; Cologuard q3y) was last done 11/13/17, Results are in chart; colonoscopy with normal results; The next colonoscopy is due  11/2027; diverticulosis and hemorrhoids     EYE EXAM: was last done 03/2019 Dr Eng     MAMMOGRAM: Done within last 2 years and results in are chart was last done 5/1/2018 with normal results     PAP SMEAR: No longer indicated due to age and history hysterectomy         PAST MEDICAL HISTORY             CURRENT MEDICAL PROVIDERS:    Dentist: Dr lange    General Surgeon: Apryl Denton    Ophthalmologist: Dr. Gallardo/Dr. Aguiar         PAST MEDICAL HISTORY                 ADVANCED DIRECTIVES: None         Surgical History:         Cholecystectomy     Hernia Repair: ventral hernia;     Hysterectomy: total, d/t endometriosis;     cornea transplant x 3;    colectomy (14 in) d/t diverticulosis;    R ankle ORIF;         Family History:     Father: Lung Cancer ( dec at age 54 )     Mother: Coronary Artery Disease         Social History:     Occupation: Homemaker    . Retired     Marital Status:      Children: 1 child         Tobacco/Alcohol/Supplements:     Last Reviewed on 2/26/2021 03:09 PM by Maria E Melgar    Tobacco: She has never smoked.  Non-drinker     Caffeine:  She admits to consuming caffeine via tea ( -occassionally, not  often ).          Substance Abuse History:     Last Reviewed on 2/26/2021 03:09 PM by Maria E Melgar        Mental Health History:     Last Reviewed on 2/26/2021 03:09 PM by Maria E Melgar        Communicable Diseases (eg STDs):     Last Reviewed on 2/26/2021 03:09 PM by Maria E Melgar        Current Problems:     Last Reviewed on 1/26/2021 03:08 PM by Maria E Melgar    Sprain of left rotator cuff capsule, subsequent encounter    Anxiety disorder, unspecified    Primary insomnia    HTN - Essential (primary) hypertension    Other long term (current) drug therapy    History of falling    Low back pain    Pain in right knee    Contusion of right knee    Laryngeal spasm    Allergic rhinitis, unspecified    Asymptomatic menopausal state        Immunizations:     None        Allergies:     Last Reviewed on 2/26/2021 02:42 PM by Tamy Ni    No Known Allergies.        Current Medications:     Last Reviewed on 2/26/2021 02:42 PM by Tamy Ni    Cortisol manager supplement one daily     AREDS eye supplement daily     Calcium 500mg/Vitamin D 400IU supplement PRN     Ibuprofen 200 mg oral tablet [2 tab bid PRN]    Pilocarpine HCl 1 % ophthalmic (eye) Drops [Instill one drop in left eye at HS]    LORazepam 1 mg oral tablet [TAKE 1 TABLET BY MOUTH ONCE DAILY AT BEDTIME AS NEEDED]    escitalopram oxalate 10 mg oral tablet [Take 1/2 (one-half) tablet by mouth once daily]    atenolol 25 mg oral tablet [TAKE ONE TABLET BY MOUTH EVERY MORNING AND THEN TAKE 1/4 TABLET AT NIGHT]    melatonin 3 mg oral tablet [ 1/2 po qhs]    Fluorometholone 0.1 % ophthalmic (eye) Drops, Suspension [instill 1 drop each eye QD]    fluticasone propionate 50 mcg/actuation Intranasal Spray, Suspension [inhale 1 spray (50 mcg) in each nostril by intranasal route once daily]    cyclobenzaprine 5 mg oral tablet [take 1 tablet (5 mg) by oral route QHS PRN]        Objective:        Vitals:         Current: 2/26/2021 2:51:49 PM    Ht:  5 ft, 1.5 in;   Wt: 199.6 lbs;  BMI: 37.1T: 97.9 F (temporal);  BP: 134/46 mm Hg (right arm, sitting);  P: 58 bpm (right arm (BP Cuff), sitting);  sCr: 0.65 mg/dL;  GFR: 79.63        Exams:     PHYSICAL EXAM:     GENERAL: vital signs recorded - well developed, well nourished;  well groomed;  no apparent distress;     EYES: extraocular movements intact; conjunctiva and cornea are normal; right coloboma;     RESPIRATORY: normal respiratory rate and pattern with no distress; normal breath sounds with no rales, rhonchi, wheezes or rubs;     CARDIOVASCULAR: normal rate; rhythm is regular;  no systolic murmur; no edema;     GASTROINTESTINAL: nontender; normal bowel sounds;     MUSCULOSKELETAL: normal gait; normal overall tone     NEUROLOGIC: mental status: alert and oriented x 3; Reflexes: brachioradialis: 2+; knee jerks: 2+;     PSYCHIATRIC: appropriate affect and demeanor; normal psychomotor function; normal speech pattern;         Lab/Test Results:         Amphetamines Screen, Urin: Negative (02/26/2021),     BAR-Barbiturates Screen, Urin: Negative (02/26/2021),     Buprenorphine: Negative (02/26/2021),     BZO-Benzodiazepines Screen,Ur: Positive (02/26/2021),     Cocaine(Metab.)Screen, Ur: Negative (02/26/2021),     MDMA-Ecstasy: Negative (02/26/2021),     Met-Methamphetamine: Negative (02/26/2021),     MTD-Methadone Screen, Urine: Negative (02/26/2021),     Opiate Screen, Urine: Negative (02/26/2021),     OXY-Oxycodone: Negative (02/26/2021),     PCP-Phencyclidine Screen, Uri: Negative (02/26/2021),     THC Cannabinoids Screen, Urin: Negative (02/26/2021),     Urine temperature: confirmed (02/26/2021),     Date and time of last pill: Lorazepam 02/25/2021 @ 2300/AS (02/26/2021),     Performed by: sq (02/26/2021),     Collection Time: 1513 (02/26/2021),             Assessment:         Z00.00   Encounter for general adult medical examination without abnormal findings       Z13.31   Encounter for screening for depression       F51.01    Primary insomnia       Z79.899   Other long term (current) drug therapy       I10   HTN - Essential (primary) hypertension           ORDERS:         Meds Prescribed:       [Refilled] atenolol 25 mg oral tablet [TAKE ONE TABLET BY MOUTH EVERY MORNING AND THEN TAKE 1/4 TABLET AT NIGHT], #100 (one hundred) tablets, Refills: 2 (two)         Radiology/Test Orders:       3017F  Colorectal CA screen results documented and reviewed (PV)  (In-House)              Lab Orders:       49111  HTNLP - [facility] CMP AND LIPID: [id number], [id number]  (Send-Out)            84380  Drug test prsmv qual dir optical obs per day  (In-House)            APPTO  Appointment need  (In-House)              Procedures Ordered:         Annual wellness visit, includes a PPPS, subsequent visit  (In-House)              Other Orders:         Depression screen negative  (In-House)            1101F  Pt screen for fall risk; document no falls in past year or only 1 fall w/o injury in past year (ROS)  (In-House)            1124F  Advance Care Planning discussed and doc in MR; no surrogate named or advance care plan provided  (Send-Out)                      Plan:         Encounter for general adult medical examination without abnormal findingsShe is UTD on colonoscopy, last done 11/2017 and this was normal.  Ten year repeat recommended.  Pap smear is no longer indicated by age and history; s/p hysterectomy.  She is due for mammogram, last done 5/2018 and this was normal; scheduled with Dr. Apryl Whyte for 2 weeks; requesting records.  She is UTD on DEXA, last done 7/2020 and this showed osteopenia.  She is due for Pneumovax, Prevnar, Shingrix, Havrix, Td and flu.  She is planning to get her COVID shot.  She is due for routine labs including HTN panel; ordered.  No fall risk, no memory issues, no signs/symptoms of depression.  She lives with her .  She is able to drive and perform ADLs/manage finances independently.  Hearing is adequate.  She does not  have a living will.  Preventive services handout and safety handout were given to her.  Current doctor list updated.  RTC 3 months.    MIPS PHQ-9 Depression Screening: Completed form scanned and in chart; Total Score 0; Negative Depression Screen ADVANCED DIRECTIVES: None         FOLLOW-UP: Schedule a follow-up visit in 3 months.:.  f/u insomnia with [place]          Orders:         Annual wellness visit, includes a PPPS, subsequent visit  (In-House)              Depression screen negative  (In-House)            1101F  Pt screen for fall risk; document no falls in past year or only 1 fall w/o injury in past year (ROS)  (In-House)            3017F  Colorectal CA screen results documented and reviewed (PV)  (In-House)            1124F  Advance Care Planning discussed and doc in MR; no surrogate named or advance care plan provided  (Send-Out)            APPTO  Appointment need  (In-House)              Primary insomniaStable on current regimen.  Sx well controlled.  No adverse effects.  She does require ongoing use of this controlled substance to function.  Tox screen and Kobi run today.  No refills needed.  RTC 3 months.        Other long term (current) drug therapy    Controlled substance documentation: Kobi reviewed; drug screen performed and appropriate; consent is reviewed and signed and on the chart.  She is aware of risk of addiction on this medication, understands that she will need to follow up for a review every 3 months and her medications will be adjusted or decreased as deemed appropriate at each visit.  No history of drug or alcohol abuse.  No concerns about diversion or abuse. She denies side effects related to the medication.  She is aware that she may be called in for pill counts.  The dosing of this medication will be reviewed on a regular basis and reduced if possible..  Ongoing use of a controlled substance is necessary for this patient to have a normal quality of life           Orders:        89911  Drug test prsmv qual dir optical obs per day  (In-House)              HTN - Essential (primary) hypertension    LABORATORY:  Labs ordered to be performed today include HTN/Lipid Panel: CMP, Lipid.            Prescriptions:       [Refilled] atenolol 25 mg oral tablet [TAKE ONE TABLET BY MOUTH EVERY MORNING AND THEN TAKE 1/4 TABLET AT NIGHT], #100 (one hundred) tablets, Refills: 2 (two)           Orders:       61044  HTN - [facility] CMP AND LIPID: [id number], [id number]  (Send-Out)                  Patient Recommendations:        For  Encounter for general adult medical examination without abnormal findings:    Schedule a follow-up visit in 3 months.                APPOINTMENT INFORMATION:        Monday Tuesday Wednesday Thursday Friday Saturday Sunday            Time:___________________AM  PM   Date:_____________________             Charge Capture:         Primary Diagnosis:     Z00.00  Encounter for general adult medical examination without abnormal findings           Orders:        Annual wellness visit, includes a PPPS, subsequent visit  (In-House)              Depression screen negative  (In-House)            1101F  Pt screen for fall risk; document no falls in past year or only 1 fall w/o injury in past year (ROS)  (In-House)            3017F  Colorectal CA screen results documented and reviewed (PV)  (In-House)            APPTO  Appointment need  (In-House)              Z13.31  Encounter for screening for depression     F51.01  Primary insomnia     Z79.899  Other long term (current) drug therapy           Orders:      11101  Drug test prsmv qual dir optical obs per day  (In-House)              I10  HTN - Essential (primary) hypertension

## 2021-05-18 NOTE — PROGRESS NOTES
"Yanni Huynh  1945     Office/Outpatient Visit    Visit Date: Thu, Apr 15, 2021 02:56 pm    Provider: Maria E Melgar MD (Assistant: Brittney Tavares MA)    Location: [facility]        Electronically signed by Maria E Melgar MD on  04/15/2021 03:40:09 PM                             Subjective:        CC: Mrs. Hunyh is a 75 year old White female.  Patient presents today to discuss HTN;         HPI: Yanni is here today to discuss HTN.  She is currently on atenolol 25 mg, which she has been for 25 years or more.  She first noticed that her BP was elevated about a week and a half ago.  She took her BP on her 's machine.  It kept giving her an error message because the cuff was too little.  She bought a new cuff but that cuff was too big.  She then went to [facility] and checked it, getting 155/70.  She repeated the BP and the systolic number went down to the 140s.  She took her BP three times this morning and it was running 129-160 systolic.          She has felt short of breath at times.  Also noticing some discomfort in the mid-back, \"but that could be from anything.\"  She has been eating a lot of salt, so she stopped that in the past few days.    ROS:     CONSTITUTIONAL:  Negative for chills and fever.      CARDIOVASCULAR:  Positive for dizziness ( very mild (notices it when she bends down and then straightens up) ) and pedal edema.   Negative for chest pain, orthopnea, palpitations, paroxysmal nocturnal dyspnea or tachycardia.      RESPIRATORY:  Positive for chronic cough.   Negative for recent cough or dyspnea.      GASTROINTESTINAL:  Negative for abdominal pain, constipation, diarrhea, nausea and vomiting.      MUSCULOSKELETAL:  Negative for back pain.          Past Medical History / Family History / Social History:         Last Reviewed on 4/15/2021 03:12 PM by Maria E Melgar    Past Medical History:                 PAST MEDICAL HISTORY         Hypertension     keratoconus s/p 3 " cornea transplants         PREVENTIVE HEALTH MAINTENANCE             BONE DENSITY: was last done 7/15/2020 with the following abnormality noted-- osteopenia     COLORECTAL CANCER SCREENING: Up to date (colonoscopy q10y; sigmoidoscopy q5y; Cologuard q3y) was last done 11/13/17, Results are in chart; colonoscopy with normal results; The next colonoscopy is due  11/2027; diverticulosis and hemorrhoids     EYE EXAM: was last done 03/2019 Dr Eng     MAMMOGRAM: Done within last 2 years and results in are chart was last done 5/1/2018 with normal results     PAP SMEAR: No longer indicated due to age and history hysterectomy         PAST MEDICAL HISTORY             CURRENT MEDICAL PROVIDERS:    Dentist: Dr lange    General Surgeon: Apryl Denton    Ophthalmologist: Dr. Gallardo/Dr. Aguiar         PAST MEDICAL HISTORY                 ADVANCED DIRECTIVES: None         Surgical History:         Cholecystectomy     Hernia Repair: ventral hernia;     Hysterectomy: total, d/t endometriosis;     cornea transplant x 3;    colectomy (14 in) d/t diverticulosis;    R ankle ORIF;         Family History:     Father: Lung Cancer ( dec at age 54 )     Mother: Coronary Artery Disease         Social History:     Occupation: Homemaker    . Retired     Marital Status:      Children: 1 child         Tobacco/Alcohol/Supplements:     Last Reviewed on 4/15/2021 03:12 PM by Maria E Melgar    Tobacco: She has never smoked.  Non-drinker     Caffeine:  She admits to consuming caffeine via tea ( -occassionally, not often ).          Substance Abuse History:     Last Reviewed on 4/15/2021 03:12 PM by Maria E Melgar        Mental Health History:     Last Reviewed on 4/15/2021 03:12 PM by Maria E Melgar        Communicable Diseases (eg STDs):     Last Reviewed on 4/15/2021 03:12 PM by Maria E Melgar        Current Problems:     Last Reviewed on 2/26/2021 03:09 PM by Maria E Melgar    Primary insomnia    HTN - Essential (primary)  hypertension    Other long term (current) drug therapy    Anxiety disorder, unspecified    Sprain of left rotator cuff capsule, subsequent encounter    History of falling    Low back pain    Pain in right knee    Contusion of right knee    Laryngeal spasm    Allergic rhinitis, unspecified    Asymptomatic menopausal state    Encounter for general adult medical examination without abnormal findings    Encounter for screening for depression        Immunizations:     None        Allergies:     Last Reviewed on 4/15/2021 03:00 PM by Brittney Tavares    No Known Allergies.        Current Medications:     Last Reviewed on 4/15/2021 03:00 PM by Brittney Tavares    Cortisol manager supplement one daily     AREDS eye supplement daily     Calcium 500mg/Vitamin D 400IU supplement PRN     Ibuprofen 200 mg oral tablet [2 tab bid PRN]    Pilocarpine HCl 1 % ophthalmic (eye) Drops [Instill one drop in left eye at HS]    escitalopram oxalate 10 mg oral tablet [Take 1/2 (one-half) tablet by mouth once daily]    LORazepam 1 mg oral tablet [TAKE 1 TABLET BY MOUTH ONCE DAILY AT BEDTIME AS NEEDED]    atenolol 25 mg oral tablet [TAKE ONE TABLET BY MOUTH EVERY MORNING AND THEN TAKE 1/4 TABLET AT NIGHT]    melatonin 3 mg oral tablet [ 1/2 po qhs]    Fluorometholone 0.1 % ophthalmic (eye) Drops, Suspension [instill 1 drop each eye QD]    fluticasone propionate 50 mcg/actuation Intranasal Spray, Suspension [inhale 1 spray (50 mcg) in each nostril by intranasal route once daily]    cyclobenzaprine 5 mg oral tablet [take 1 tablet (5 mg) by oral route QHS PRN]        Objective:        Vitals:         Current: 4/15/2021 3:01:38 PM    Ht:  5 ft, 1.5 in;  Wt: 200 lbs;  BMI: 37.2T: 97.7 F (temporal);  BP: 162/69 mm Hg (right arm, sitting);  P: 63 bpm (right arm (BP Cuff), sitting);  sCr: 0.69 mg/dL;  GFR: 75.08        Repeat:     3:29:24 PM  BP:   1343:29:24 PM  BP:   134/78mm Hg (left arm, sitting)     Exams:     PHYSICAL EXAM:     GENERAL: vital  signs recorded - well developed, well nourished;  well groomed;  no apparent distress;     EYES: extraocular movements intact; conjunctiva and cornea are normal; pupils and irises are normal;     RESPIRATORY: normal respiratory rate and pattern with no distress; normal breath sounds with no rales, rhonchi, wheezes or rubs;     CARDIOVASCULAR: normal rate; rhythm is regular;  no systolic murmur; no edema;     MUSCULOSKELETAL: normal gait; normal overall tone         Assessment:         I10   HTN - Essential (primary) hypertension           Plan:         HTN - Essential (primary) hypertensionCristin is on atenolol 25 mg daily and has been for decades.  BP is up a little bit today on arrival but looks better on manual recheck.  Hard to know if she is really having elevated pressures at this point, or if she is just so worried about it that the BP is going up.  She is going to watch the pressures at home for the next few weeks and bring a log of pressures in to her next appt with me in May.  She can call in the meantime if she has concerns.  No changes to the atenolol today.  She does have a tendency to develop asymptomatic bradycardia, so I think raising the atenolol itself would be unwise.  We could always add a second agent if needed though.  Labs reviewed and UTD.        FOLLOW-UP: Keep currently scheduled appointment.:.              Patient Recommendations:        For  HTN - Essential (primary) hypertension:                    APPOINTMENT INFORMATION:        Monday Tuesday Wednesday Thursday Friday Saturday Sunday            Time:___________________AM  PM   Date:_____________________             Charge Capture:         Primary Diagnosis:     I10  HTN - Essential (primary) hypertension           Orders:      21525  Office/outpatient visit; established patient, level 4  (In-House)

## 2021-05-18 NOTE — PROGRESS NOTES
Yanni Huynh 1945     Office/Outpatient Visit    Visit Date: Tue, Jun 26, 2018 09:35 am    Provider: Maria E Melgar MD (Assistant: Vannessa Mazariegos RN)    Location: [facility]        Electronically signed by Maria E Melgar MD on  06/26/2018 08:41:04 PM                             SUBJECTIVE:        CC:     Mrs. Huynh is a 72 year old White female.  Medicare Wellness Exam;         HPI:     Colonoscopy is UTD, last done 11/2017 and this was normal.  Pap smears are no longer indicated by age and history; s/p hysterectomy.  She is UTD on mammogram, last done 4/2018 and this was normal.  Dr. Apryl Whyte did that.  She is due for DEXA, last done over 20 years ago.  She is due for Pneumovax, Prevnar, shingles, tetanus and flu vaccine but adamantly DECLINES all vaccinations.  She is due for routine labs including HTN panel.        She is on lorazepam as well as escitalopram for anxiety.  She has been on this for years through Dr. Saunders previously.  We have tried weaning her down but she has not been able to do this.  No adverse effects.  She is due for a tox screen today.     Mrs. Huynh is here for a Medicare wellness visit.  A list of current providers and suppliers reviewed and updated A current height, weight, BMI, blood pressure, and pulse were recorded in the vitals section of the note and have been reviewed A review of cognitive impairment was performed and was negative.  A review of functional ability and level of safety was performed and was negative She denies having trouble hearing the TV/radio when others do not, having to strain to hear or understand conversations and wearing hearing aid(s).  Concerning home safety, she reports that at home she DOES have throw rugs, handrails on stairs and functioning smoke alarms, but not poor lighting, a slippery bath or shower or grab bars in the bath.      Immunization Status: PT REFUSES ALL VACCINATIONS;     Physical Activity: ** Exercises  infrequently; Has not had any falls or only one fall without injury in the past year.  Advance Care Directive updated today in PM[facility] Tobacco: She has never smoked.    Preventative Health updated today         PHQ-9 Depression Screening: Completed form scanned and in chart; Total Score 0 Alcohol Consumption Screening: Completed form scanned and in chart; Total Score 0     ROS:     CONSTITUTIONAL:  Negative for fatigue and fever.      EYES:  Negative for blurred vision.      CARDIOVASCULAR:  Negative for chest pain and palpitations.      RESPIRATORY:  Negative for recent cough and dyspnea.      GASTROINTESTINAL:  Negative for abdominal pain, constipation, diarrhea, nausea and vomiting.      GENITOURINARY:  Positive for dysuria.   Negative for hematuria or frequent urination.      MUSCULOSKELETAL:  Positive for arthralgias (b/l shoulders, L elbow (now improved after steroid injection)).   Negative for myalgias.      INTEGUMENTARY/BREAST:  Negative for atypical mole(s) and rash.      NEUROLOGICAL:  Negative for paresthesias and weakness.      PSYCHIATRIC:  Negative for anxiety, depression and sleep disturbance.          PM/FM/SH:     Last Reviewed on 6/26/2018 10:07 AM by Maria E Melgar    Past Medical History:                 PAST MEDICAL HISTORY         Hypertension     keratoconus s/p 3 cornea transplants         PREVENTIVE HEALTH MAINTENANCE             BONE DENSITY: over 20 years ago     COLORECTAL CANCER SCREENING: Up to date (colonoscopy q10y; sigmoidoscopy q5y; Cologuard q3y) was last done 11/13/17, Results are in chart; colonoscopy with normal results; The next colonoscopy is due  11/2027; diverticulosis and hemorrhoids     EYE EXAM: was last done 3/2018     MAMMOGRAM: Done within last 2 years and results in are chart was last done 4/18/17 with normal results     PAP SMEAR: No longer indicated due to age and history hysterectomy         PAST MEDICAL HISTORY             CURRENT MEDICAL PROVIDERS:    Dentist:   anisa    General Surgeon: Apryl Denton    Ophthalmologist: Dr. Gallardo/Dr. Aguiar         PAST MEDICAL HISTORY                 ADVANCED DIRECTIVES: None         Surgical History:         Cholecystectomy      Hernia Repair: ventral hernia;     Hysterectomy: total, d/t endometriosis;     cornea transplant x 3;    colectomy (14 in) d/t diverticulosis;    R ankle ORIF;         Family History:     Father: Lung Cancer ( dec at age 54 )     Mother: Coronary Artery Disease         Social History:     Occupation: Homemaker    . Retired     Marital Status:      Children: 1 child         Tobacco/Alcohol/Supplements:     Last Reviewed on 6/26/2018 10:07 AM by Maria E Melgar    Tobacco: She has never smoked.          Substance Abuse History:     Last Reviewed on 6/26/2018 10:07 AM by Maria E Melgar        Mental Health History:     Last Reviewed on 6/26/2018 10:07 AM by Maria E Melgar        Communicable Diseases (eg STDs):     Last Reviewed on 6/26/2018 10:07 AM by Maria E Melgar            Current Problems:     Last Reviewed on 2/28/2018 10:17 AM by Maria E Melgar    Screening for cancer of colon     Hypertension     Use of high risk medications     Anxiety     Low back pain     Rotator cuff tear     Insomnia         Immunizations:     None        Allergies:     Last Reviewed on 6/26/2018 09:39 AM by Vannessa Mazariegos      No Known Drug Allergies.         Current Medications:     Last Reviewed on 6/26/2018 09:42 AM by Vannessa Mazariegos    Atenolol 25mg Tablet Take one tablet by mouth in the morning and 1/4 of a tablet at night     Lorazepam 1mg Tablet 1 tab Q HS PRN     Escitalopram Oxalate 10mg Tablet 1/2 tab daily     Proctosol-HC 2.5% Cream prn for hemmorroids     AREDS eye supplement daily     Calcium 500mg/Vitamin D 400IU supplement PRN     Cortisol manager supplement one daily     Melatonin 3mg Tablet 1 po qhs     Pilocarpine HCl 1% Ophthalmic Solution Instill one drop in left eye at HS     Diclofenac  Sodium 1% Topical Gel apply 2-4grams QID PRN for pain     Fluorometholone 0.1% Ophthalmic Suspension instill 1 drop each eye QD     Ibuprofen 200mg Tablet 2 tab bid PRN         OBJECTIVE:        Vitals:         Current: 6/26/2018 9:38:52 AM    Ht:  5 ft, 1.5 in;  Wt: 203.6 lbs;  BMI: 37.8    T: 97.3 F (oral);  BP: 133/74 mm Hg (left arm, sitting);  P: 54 bpm (left arm (BP Cuff), sitting)    VA: 20/70 OD, 20/30 OS (near, without correction)        Repeat:     9:56:12 AM     VA:    (20/70 OD,  (near, without correction, , 20/30 OS, , BILATERAL 20/30))         Exams:     PHYSICAL EXAM:     GENERAL: vital signs recorded - well developed, well nourished;  well groomed;  no apparent distress;     EYES: extraocular movements intact; conjunctiva and cornea are normal; right coloboma;     E/N/T: OROPHARYNX:  normal mucosa, dentition, gingiva, and posterior pharynx;     RESPIRATORY: normal respiratory rate and pattern with no distress; normal breath sounds with no rales, rhonchi, wheezes or rubs;     CARDIOVASCULAR: normal rate; rhythm is regular;  no systolic murmur; no edema;     GASTROINTESTINAL: nontender; normal bowel sounds;     LYMPHATIC: no enlargement of cervical or facial nodes;     MUSCULOSKELETAL: normal gait; normal overall tone     NEUROLOGIC: mental status: alert and oriented x 3; Reflexes: brachioradialis: 2+; knee jerks: 2+;     PSYCHIATRIC: appropriate affect and demeanor; normal psychomotor function; normal speech pattern;         Lab/Test Results:             All urine drug screen levels confirmed negative:  yes (06/26/2018),     Date and time of last pill:  Lorazepam 6/26/18 12:45 (06/26/2018),     Performed by:  altagracia (06/26/2018),     Collection Time:  1040 (06/26/2018),             ASSESSMENT           V70.0   Z00.00  Health checkup              DDx:     V79.0   Z13.89  Screening for depression              DDx:     300.02   F41.9  Anxiety              DDx:     V58.69   Z79.899  Use of high risk  medications              DDx:     401.1   I10  Hypertension              DDx:     627.9   N95.9  Postmenopausal disorder              DDx:     788.1   R30.0  Dysuria              DDx:         ORDERS:         Meds Prescribed:       Refill of: Lorazepam 1mg Tablet 1 tab Q HS PRN  #30 (Thirty) tablet(s) Refills: 2         Radiology/Test Orders:       3014F  Screening mammography results documented and reviewed (PV)1  (In-House)         3017F  Colorectal CA screen results documented and reviewed (PV)  (In-House)         26733  DXA, bone density study, 1 or more sites; axial skeleton (eg hips, pelvis, spine)  (Send-Out)           Lab Orders:       APPTO  Appointment need  (In-House)         36147  HTNLP - [facility] CMP AND LIPID: [id number]  (Send-Out)         72892  Drug test prsmv qual dir optical obs per day  (In-House)         13870  BDUAM - [facility] Urinalysis, automated, with micro  (Send-Out)         14684  BENCU - [facility] [id number] BENZODIAZEPINES  (Send-Out)           Procedures Ordered:         Annual wellness visit, includes a PPPS, subsequent visit  (In-House)           Other Orders:         Depression screen negative  (In-House)         1101F  Pt screen for fall risk; document no falls in past year or only 1 fall w/o injury in past year (ORS)  (In-House)           Negative EtOH screen  (In-House)           Calculated BMI above the upper parameter and a follow-up plan was documented in the medical record  (In-House)                   PLAN:          Health checkup Colonoscopy is UTD, last done 11/2017 and this was normal.  Pap smears are no longer indicated by age and history; s/p hysterectomy.  She is UTD on mammogram, last done 4/2018 and this was normal.  Dr. Apryl Whyte did that.  Requesting records.  She is due for DEXA, last done over 20 years ago; ordered.  She is due for Pneumovax, Prevnar, shingles, tetanus and flu vaccine but adamantly DECLINES all vaccinations.  She is due for routine labs  including HTN panel; ordered.  No fall risk, no memory issues, no signs/symptoms of depression.  She lives with her .  She is able to drive and perform ADLs/manage finances independently.  Hearing is adequate.  She does not have a living will; information given today on how to obtain one.  Preventive services handout and safety handout were given to her.  Current doctor list updated.  RTC 3 months.     MIPS PHQ-9 Depression Screening: Completed form scanned and in chart; Total Score 0 Negative alcohol screen     MAMMOGRAM: Done within last 2 years and results in are chart     COLORECTAL CANCER SCREENING: Results are in chart     BMI Elevated - Follow-Up Plan: She was provided education on weight loss strategies     FOLLOW-UP: Schedule a follow-up visit in 3 months..  f/u anxiety with Talia           Orders:       APPTO  Appointment need  (In-House)           Annual wellness visit, includes a PPPS, subsequent visit  (In-House)           Depression screen negative  (In-House)         1101F  Pt screen for fall risk; document no falls in past year or only 1 fall w/o injury in past year (ROS)  (In-House)           Negative EtOH screen  (In-House)         3014F  Screening mammography results documented and reviewed (PV)1  (In-House)         3017F  Colorectal CA screen results documented and reviewed (PV)  (In-House)           Calculated BMI above the upper parameter and a follow-up plan was documented in the medical record  (In-House)             Patient Education Handouts:       Physical Exam 60+ year, Female           Screening for depression Score of 0, not suggestive of depression.          Anxiety Stable on escitalopram and lorazepam.  No adverse effects.  She does require ongoing use of this controlled substance to function.  Refills sent today.  Tox screen and Kobi run.  RTC 3 months.           Prescriptions:       Refill of: Lorazepam 1mg Tablet 1 tab Q HS PRN  #30 (Thirty) tablet(s)  Refills: 2          Use of high risk medications     LABORATORY:  Labs ordered to be performed today include Drug Screen Urine [facility] Confirmation BENZODIAZEPINES.  Controlled substance documentation: Kobi reviewed; drug screen performed and appropriate; consent is reviewed and signed and on the chart.  She is aware of risk of addiction on this medication, understands that she will need to follow up for a review every 3 months and her medications will be adjusted or decreased as deemed appropriate at each visit.  No history of drug or alcohol abuse.  No concerns about diversion or abuse. She denies side effects related to the medication.  She is aware that she may be called in for pill counts.  The dosing of this medication will be reviewed on a regular basis and reduced if possible..  Ongoing use of a controlled substance is necessary for this patient to have a normal quality of life Drug screen           Orders:       54194  Drug test prsmv qual dir optical obs per day  (In-House)         34783  BENCU - [facility] [id number] BENZODIAZEPINES  (Send-Out)            Hypertension BP at goal.  Checking labs.  No refills needed.     LABORATORY:  Labs ordered to be performed today include HTN/Lipid Panel: CMP, Lipid.            Orders:       24530  HTN - [facility] CMP AND LIPID: [id number]  (Send-Out)            Postmenopausal disorder         RADIOLOGY:  I have ordered Dexa Scan to be done today.            Orders:       74377  DXA, bone density study, 1 or more sites; axial skeleton (eg hips, pelvis, spine)  (Send-Out)            Dysuria C/o dysuria 2 days ago.  Now resolved.  Will check UA at her request.     LABORATORY:  Labs ordered to be performed today include urinalysis with micro.            Orders:       26603  BDUAM - [facility] Urinalysis, automated, with micro  (Send-Out)               Patient Recommendations:        For  Health checkup:     Schedule a follow-up visit in 3 months.                APPOINTMENT INFORMATION:         Monday Tuesday Wednesday Thursday Friday Saturday Sunday            Time:___________________AM  PM   Date:_____________________             CHARGE CAPTURE           **Please note: ICD descriptions below are intended for billing purposes only and may not represent clinical diagnoses**        Primary Diagnosis:         V70.0 Health checkup            Z00.00    Encntr for general adult medical exam w/o abnormal findings              Orders:          APPTO   Appointment need  (In-House)                Annual wellness visit, includes a PPPS, subsequent visit  (In-House)                Depression screen negative  (In-House)             1101F   Pt screen for fall risk; document no falls in past year or only 1 fall w/o injury in past year (ROS)  (In-House)                Negative EtOH screen  (In-House)             3014F   Screening mammography results documented and reviewed (PV)1  (In-House)             3017F   Colorectal CA screen results documented and reviewed (PV)  (In-House)                Calculated BMI above the upper parameter and a follow-up plan was documented in the medical record  (In-House)           V79.0 Screening for depression            Z13.89    Encounter for screening for other disorder    300.02 Anxiety            F41.9    Anxiety disorder, unspecified    V58.69 Use of high risk medications            Z79.899    Other long term (current) drug therapy              Orders:          77651   Drug test prsmv qual dir optical obs per day  (In-House)           401.1 Hypertension            I10    Essential (primary) hypertension    627.9 Postmenopausal disorder            N95.9    Unspecified menopausal and perimenopausal disorder    788.1 Dysuria            R30.0    Dysuria

## 2021-05-18 NOTE — PROGRESS NOTES
Yanni Huynh 1945     Office/Outpatient Visit    Visit Date: Wed, Jul 10, 2019 01:59 pm    Provider: Maria E Melgar MD (Assistant: Brittney Tavares MA)    Location: [facility]        Electronically signed by Maria E Melgar MD on  07/10/2019 05:03:29 PM                             SUBJECTIVE:        CC:     Mrs. Huynh is a 73 year old White female.  Patient presents today for [facility] visit;         HPI:     She is UTD on colonoscopy, last done 11/2017 and this was normal.  Ten year repeat recommended.  Pap smear is no longer indicated by age and history; s/p hysterectomy.  She is due for mammogram, last done 5/2018 and this was normal.  She is UTD on DEXA, last done 6/2018 and this showed osteopenia.  She is due for Pneumovax, Prevnar, Shingrix, Havrix, Td and flu.  She is UTD on routine labs.         She is on lorazepam and melatonin both for insomnia.  She has been on the lorazepam for years now.  She saw Dr. Demetrius Romero PhD in 2015 who worked with her to decrease the amount she was using, but she was unable to get off of it altogether.  She has tried trazodone and Ambien in the past but had intolerable side effects with both of these.         She had a choking spell about 4 weeks ago.  She woke up feeling like she couldn't breathe, and she was making a squeaking sound.  After a few seconds, she was able to clear the sensation, whatever it was.     Mrs. Huynh is here for a Medicare wellness visit.  ADVANCED DIRECTIVES: None     Returning to health checkup, the required HRA questions are integrated within this visit note. Family medical history and individual medical/surgical history were reviewed and updated.  A current height, weight, BMI, blood pressure, and pulse were recorded in the vitals section of the note and have been reviewed. Patient's medications, including supplements, were recorded in the chart and reviewed.  Current providers and suppliers were reviewed and updated.           Self-Assessment of Health: She rates her health as very good. She rates her confidence of being able to control/manage most of her health problems as very confident. Her physical/emotional health has limited her social activites not at all.  A review of cognitive impairment was performed, including ability to drive a car, manage finances, and any memory changes, and was found to be negative.  A review of functional ability, including bathing, dressing, walking, and urine/bowel continence as well as level of safety was performed and was found to be negative.  Falls Risk: Has not had any falls or only one fall without injury in the past year.  She denies having trouble hearing the TV/radio when others do not, having to strain to hear or understand conversations and wearing hearing aid(s).  Concerning home safety, she reports that at home she DOES have adequate lighting, a skid resistant shower/tub, handrails on stairs and functioning smoke alarms, but not grab bars in the bath or absence of throw rugs.          Immunization Status: ** Has not received pneumococcal vaccination; ** Has not received influenza vaccine for this season; ** Has not received Prevnar 13 vaccination; Age>60, no shingles vaccination; Physical Activity: She exercises but less than 20 minutes 3 days per week; Type of diet patient normally eats is described as well-balanced with fruits and vegetables Tobacco: She has never smoked.  Preventative Health updated today         PHQ-9 Depression Screening: Completed form scanned and in chart; Total Score 1 Alcohol Consumption Screening: Completed form scanned and in chart; Total Score 0     ROS:     CONSTITUTIONAL:  Negative for fatigue and fever.      EYES:  Negative for blurred vision.      E/N/T:  Negative for nasal congestion and frequent rhinorrhea.      CARDIOVASCULAR:  Negative for chest pain and palpitations.      RESPIRATORY:  Positive for chronic cough.   Negative for recent cough or dyspnea.       GASTROINTESTINAL:  Negative for abdominal pain, constipation, diarrhea, nausea and vomiting.      MUSCULOSKELETAL:  Positive for arthralgias (bilateral shoulders).   Negative for myalgias.      PSYCHIATRIC:  Negative for anxiety, depression and sleep disturbance.          PMH/FMH/SH:     Last Reviewed on 7/10/2019 02:21 PM by Maria E Melagr    Past Medical History:                 PAST MEDICAL HISTORY         Hypertension     keratoconus s/p 3 cornea transplants         PREVENTIVE HEALTH MAINTENANCE             BONE DENSITY: was last done 6/28/2018 with the following abnormality noted-- osteopenia     COLORECTAL CANCER SCREENING: Up to date (colonoscopy q10y; sigmoidoscopy q5y; Cologuard q3y) was last done 11/13/17, Results are in chart; colonoscopy with normal results; The next colonoscopy is due  11/2027; diverticulosis and hemorrhoids     EYE EXAM: was last done 03/2019 Dr Eng     MAMMOGRAM: Done within last 2 years and results in are chart was last done 5/1/2018 with normal results     PAP SMEAR: No longer indicated due to age and history hysterectomy         PAST MEDICAL HISTORY             CURRENT MEDICAL PROVIDERS:    Dentist: Dr lange    General Surgeon: Apryl Denton    Ophthalmologist: Dr. Gallardo/Dr. Aguiar         PAST MEDICAL HISTORY                 ADVANCED DIRECTIVES: None         Surgical History:         Cholecystectomy      Hernia Repair: ventral hernia;     Hysterectomy: total, d/t endometriosis;     cornea transplant x 3;    colectomy (14 in) d/t diverticulosis;    R ankle ORIF;         Family History:     Father: Lung Cancer ( dec at age 54 )     Mother: Coronary Artery Disease         Social History:     Occupation: Homemaker    . Retired     Marital Status:      Children: 1 child         Tobacco/Alcohol/Supplements:     Last Reviewed on 7/10/2019 02:21 PM by Maria E Melgar    Tobacco: She has never smoked.          Substance Abuse History:     Last Reviewed on 7/10/2019  02:21 PM by Maria E Melgar        Mental Health History:     Last Reviewed on 7/10/2019 02:21 PM by Maria E Melgar        Communicable Diseases (eg STDs):     Last Reviewed on 7/10/2019 02:21 PM by Maria E Melgar            Current Problems:     Last Reviewed on 3/19/2019 11:31 AM by Maria E Melgar    Screening for cancer of colon     Hypertension     Use of high risk medications     Anxiety     Rotator cuff tear     Insomnia         Immunizations:     None        Allergies:     Last Reviewed on 3/19/2019 11:31 AM by Maria E Melgar      No Known Drug Allergies.         Current Medications:     Last Reviewed on 3/19/2019 11:31 AM by Maria E Melgar    Escitalopram Oxalate 10mg Tablet 1/2 tab daily     Atenolol 25mg Tablet Take one tablet by mouth in the morning and 1/4 of a tablet at night     Lorazepam 1mg Tablet 1 tab Q HS PRN     AREDS eye supplement daily     Calcium 500mg/Vitamin D 400IU supplement PRN     Cortisol manager supplement one daily     Melatonin 3mg Tablet 1 po qhs     Pilocarpine HCl 1% Ophthalmic Solution Instill one drop in left eye at HS     Fluorometholone 0.1% Ophthalmic Suspension instill 1 drop each eye QD     Ibuprofen 200mg Tablet 2 tab bid PRN         OBJECTIVE:        Vitals:         Current: 7/10/2019 2:05:09 PM    Ht:  5 ft, 1.5 in;  Wt: 205.4 lbs;  BMI: 38.2    T: 98.4 F (oral);  BP: 146/62 mm Hg (left arm, sitting);  P: 58 bpm (left arm (BP Cuff), sitting);  sCr: 0.6 mg/dL;  GFR: 89.99    VA: 20/20 OD, 20/20 OS (near, with correction)        Repeat:     2:43:12 PM     BP:   137/63mm Hg (left arm, sitting)         Exams:     PHYSICAL EXAM:     GENERAL: vital signs recorded - well developed, well nourished;  well groomed;  no apparent distress;     EYES: extraocular movements intact; conjunctiva and cornea are normal; right coloboma;     E/N/T: OROPHARYNX: oral mucosa is normal; posterior pharynx shows cobblestone appearance and no exudate;     RESPIRATORY: normal respiratory rate  and pattern with no distress; normal breath sounds with no rales, rhonchi, wheezes or rubs;     CARDIOVASCULAR: normal rate; rhythm is regular;  no systolic murmur; no edema;     GASTROINTESTINAL: nontender; normal bowel sounds;     MUSCULOSKELETAL: normal gait; normal overall tone     NEUROLOGIC: mental status: alert and oriented x 3; Reflexes: brachioradialis: 2+; knee jerks: 2+;     PSYCHIATRIC: appropriate affect and demeanor; normal psychomotor function; normal speech pattern;         Lab/Test Results:             Amphetamines Screen, Urin:  Negative (07/10/2019),     BAR-Barbiturates Screen, Urin:  Negative (07/10/2019),     Buprenorphine:  Negative (07/10/2019),     BZO-Benzodiazepines Screen,Ur:  Positive (07/10/2019),     Cocaine(Metab.)Screen, Ur:  Negative (07/10/2019),     MDMA-Ecstasy:  Negative (07/10/2019),     Met-Methamphetamine:  Negative (07/10/2019),     MTD-Methadone Screen, Urine:  Negative (07/10/2019),     Opiate Screen, Urine:  Negative (07/10/2019),     OXY-Oxycodone:  Negative (07/10/2019),     PCP-Phencyclidine Screen, Uri:  Negative (07/10/2019),     THC Cannabinoids Screen, Urin:  Negative (07/10/2019),     Urine temperature:  confirmed (07/10/2019),     Date and time of last pill:  Lorazepam 07/10/2019 @ 0100/AS (07/10/2019),     Performed by:  karly (07/10/2019),     Collection Time:  1450 (07/10/2019),             ASSESSMENT           V70.0   Z00.00  Health checkup              DDx:     V79.0   Z13.89  Screening for depression              DDx:     300.02   F41.9  Anxiety              DDx:     V58.69   Z79.899  Use of high risk medications              DDx:     V76.12   Z12.31  Screening mammogram - other              DDx:         ORDERS:         Radiology/Test Orders:       3014F  Screening mammography results documented and reviewed (PV)1  (In-House)         3017F  Colorectal CA screen results documented and reviewed (PV)  (In-House)           Lab Orders:       [id number]  Drug test prsmv  qual dir optical obs per day  (In-House)           Procedures Ordered:         Annual wellness visit, includes a PPPS, subsequent visit  (In-House)           Other Orders:       1101F  Pt screen for fall risk; document no falls in past year or only 1 fall w/o injury in past year (ROS)  (In-House)           Depression screen negative  (In-House)           Negative EtOH screen  (In-House)                   PLAN:          Health checkup She is UTD on colonoscopy, last done 11/2017 and this was normal.  Ten year repeat recommended.  Pap smear is no longer indicated by age and history; s/p hysterectomy.  She is due for mammogram, last done 5/2018 and this was normal.  She usually gets this done  through Dr. Arpyl Whyte, so she will contact Dr. Whyte to get an appt.  She is UTD on DEXA, last done 6/2018 and this showed osteopenia.  She is due for Pneumovax, Prevnar, Shingrix, Havrix, Td and flu.  She declines all vaccines today.  She is UTD on routine labs.  No fall risk, no memory issues, no signs/symptoms of depression.  She lives with her .  She is able to drive and perform ADLs/manage finances independently.  Hearing is adequate.  She does not have a living will; information given today on how to obtain one.  Preventive services handout and safety handout were given to her.  Current doctor list updated.  RTC 3 months.     MIPS PHQ-9 Depression Screening: Completed form scanned and in chart; Total Score 1; Negative Depression Screen Negative alcohol screen           Orders:         Annual wellness visit, includes a PPPS, subsequent visit  (In-House)         1101F  Pt screen for fall risk; document no falls in past year or only 1 fall w/o injury in past year (ROS)  (In-House)         3014F  Screening mammography results documented and reviewed (PV)1  (In-House)         3017F  Colorectal CA screen results documented and reviewed (PV)  (In-House)           Depression screen negative   (In-House)           Negative EtOH screen  (In-House)            Use of high risk medications     Controlled substance documentation: Kobi reviewed; drug screen performed and appropriate; consent is reviewed and signed and on the chart.  She is aware of risk of addiction on this medication, understands that she will need to follow up for a review every 3 months and her medications will be adjusted or decreased as deemed appropriate at each visit.  No history of drug or alcohol abuse.  No concerns about diversion or abuse. She denies side effects related to the medication.  She is aware that she may be called in for pill counts.  The dosing of this medication will be reviewed on a regular basis and reduced if possible..  Ongoing use of a controlled substance is necessary for this patient to have a normal quality of life           Orders:       65136  Drug test prsmv qual dir optical obs per day  (In-House)            Screening mammogram - other She will contact Dr. Apryl Whyte to be seen and get the mammo at that time.             CHARGE CAPTURE           **Please note: ICD descriptions below are intended for billing purposes only and may not represent clinical diagnoses**        Primary Diagnosis:         V70.0 Health checkup            Z00.00    Encounter for general adult medical examination without abnormal findings              Orders:             Annual wellness visit, includes a PPPS, subsequent visit  (In-House)             1101F   Pt screen for fall risk; document no falls in past year or only 1 fall w/o injury in past year (ROS)  (In-House)             3014F   Screening mammography results documented and reviewed (PV)1  (In-House)             3017F   Colorectal CA screen results documented and reviewed (PV)  (In-House)                Depression screen negative  (In-House)                Negative EtOH screen  (In-House)           V79.0 Screening for depression            Z13.89     Encounter for screening for other disorder    300.02 Anxiety            F41.9    Anxiety disorder, unspecified    V58.69 Use of high risk medications            Z79.899    Other long term (current) drug therapy              Orders:          01738   Drug test prsmv qual dir optical obs per day  (In-House)           V76.12 Screening mammogram - other            Z12.31    Encounter for screening mammogram for malignant neoplasm of breast

## 2021-05-18 NOTE — PROGRESS NOTES
Yanni Huynh  1945     Office/Outpatient Visit    Visit Date: Tue, Nov 10, 2020 04:14 pm    Provider: Kacie Vincent N.P. (Assistant: Willow Arenas, )    Location: [facility]        Electronically signed by Kacie Vincent N.P. on  11/10/2020 05:14:32 PM                             Subjective:        CC: Mrs. Huynh is a 74 year old White female.  cough, left ear pain, low grade temp at night;         HPI:       patient reports 1 week now with cough, sore throat, congestion. Used vapor rub and had an old prescription of promethazine/codeine cough medication. Left ear pain that is a throbbing pain. Patient recently traveled to [place]. Fevers at night that is low grade 100 per the patient.     ROS:     CONSTITUTIONAL:  Negative for chills, fatigue and fever.      EYES:  Negative for blurred vision.      E/N/T:  Positive for ear pain ( left ), nasal congestion and sore throat.   Negative for frequent rhinorrhea, hoarseness or sinus pressure.      CARDIOVASCULAR:  Negative for chest pain and pedal edema.      RESPIRATORY:  Positive for recent cough.   Negative for dyspnea.      GASTROINTESTINAL:  Negative for abdominal pain, constipation, diarrhea, nausea and vomiting.      GENITOURINARY:  Negative for dysuria and frequent urination.      MUSCULOSKELETAL:  Negative for myalgias.      NEUROLOGICAL:  Negative for headaches.      PSYCHIATRIC:  Negative for anxiety, depression, and sleep disturbances.          Past Medical History / Family History / Social History:         Last Reviewed on 11/10/2020 04:29 PM by Kacie Vincent    Past Medical History:                 PAST MEDICAL HISTORY         Hypertension     keratoconus s/p 3 cornea transplants         PREVENTIVE HEALTH MAINTENANCE             BONE DENSITY: was last done 7/15/2020 with the following abnormality noted-- osteopenia     COLORECTAL CANCER SCREENING: Up to date (colonoscopy q10y; sigmoidoscopy q5y; Cologuard q3y) was last done  11/13/17, Results are in chart; colonoscopy with normal results; The next colonoscopy is due  11/2027; diverticulosis and hemorrhoids     EYE EXAM: was last done 03/2019 Dr Eng     MAMMOGRAM: Done within last 2 years and results in are chart was last done 5/1/2018 with normal results     PAP SMEAR: No longer indicated due to age and history hysterectomy         PAST MEDICAL HISTORY             CURRENT MEDICAL PROVIDERS:    Dentist: Dr lange    General Surgeon: Apryl Denton    Ophthalmologist: Dr. Gallardo/Dr. Aguiar         PAST MEDICAL HISTORY                 ADVANCED DIRECTIVES: None         Surgical History:         Cholecystectomy     Hernia Repair: ventral hernia;     Hysterectomy: total, d/t endometriosis;     cornea transplant x 3;    colectomy (14 in) d/t diverticulosis;    R ankle ORIF;         Family History:     Father: Lung Cancer ( dec at age 54 )     Mother: Coronary Artery Disease         Social History:     Occupation: Homemaker    . Retired     Marital Status:      Children: 1 child         Tobacco/Alcohol/Supplements:     Last Reviewed on 11/10/2020 04:29 PM by Kacie Vincent    Tobacco: She has never smoked.  Non-drinker     Caffeine:  She admits to consuming caffeine via tea ( -occassionally, not often ).          Substance Abuse History:     Last Reviewed on 9/29/2020 11:11 AM by Maria E Melgar        Mental Health History:     Last Reviewed on 9/29/2020 11:11 AM by Maria E Melgar        Communicable Diseases (eg STDs):     Last Reviewed on 9/29/2020 11:11 AM by Maria E Melgar        Immunizations:     None        Allergies:     Last Reviewed on 11/10/2020 04:28 PM by Kacie Vinecnt    No Known Allergies.        Current Medications:     Last Reviewed on 11/10/2020 04:29 PM by Kacie Vincent    Cortisol manager supplement one daily     AREDS eye supplement daily     Calcium 500mg/Vitamin D 400IU supplement PRN     Ibuprofen 200 mg oral tablet [2 tab bid PRN]    Pilocarpine HCl  1 % ophthalmic (eye) Drops [Instill one drop in left eye at HS]    atenolol 25 mg oral tablet [TAKE ONE TABLET BY MOUTH EVERY MORNING AND THEN TAKE 1/4 TABLET AT NIGHT]    escitalopram oxalate 10 mg oral tablet [Take 1/2 (one-half) tablet by mouth once daily]    LORazepam 1 mg oral tablet [TAKE 1 TABLET BY MOUTH ONCE DAILY AT BEDTIME AS NEEDED]    melatonin 3 mg oral tablet [ 1/2 po qhs]    Fluorometholone 0.1 % ophthalmic (eye) Drops, Suspension [instill 1 drop each eye QD]    fluticasone propionate 50 mcg/actuation Intranasal Spray, Suspension [inhale 1 spray (50 mcg) in each nostril by intranasal route once daily]        Objective:        Vitals:         Current: 11/10/2020 4:20:24 PM    Ht:  5 ft, 1.5 inT: 97.8 F (temporal);  BP: 170/73 mm Hg (left arm, sitting);  P: 59 bpm (left arm (BP Cuff), sitting);  sCr: 0.65 mg/dL;  GFR: 73.66        Repeat:     4:55:31 PM  BP:   159/74mm Hg (right arm, sitting, HR: 67)     Exams:     PHYSICAL EXAM:     GENERAL:  well developed and nourished; appropriately groomed; in no apparent distress;     EYES: PERRL, EOMI     E/N/T: EARS: the left TM is bulging, dull, has fluid behind it, and red and the right TM is has fluid behind it;  NOSE: bilateral frontal sinus tenderness present;     RESPIRATORY: normal respiratory rate and pattern with no distress; normal breath sounds with no rales, rhonchi, wheezes or rubs;     CARDIOVASCULAR: normal rate; rhythm is regular;  no systolic murmur; no edema;     LYMPHATIC: no enlargement of cervical or facial nodes; no supraclavicular nodes;     BREAST/INTEGUMENT: no rashs or lesions noted;     MUSCULOSKELETAL:  gait normal;     NEUROLOGIC: mental status: alert and oriented x 3; GROSSLY INTACT     PSYCHIATRIC:  appropriate affect and demeanor; normal speech pattern; grossly normal memory;         Assessment:         R05   Cough       H66.002   Acute suppurative otitis media without spontaneous rupture of ear drum, left ear       J01.10    Acute frontal sinusitis, unspecified           ORDERS:         Meds Prescribed:       [New Rx] Augmentin 875-125 mg oral tablet [take 1 tablet by oral route every 12 hours for 10 days], #20 (twenty) tablets, Refills: 0 (zero)         Lab Orders:       [id number]  COVID 19 Testing  (Send-Out)                      Plan:         Cough    LABORATORY:  Labs ordered to be performed today include COVID 19 Testing.      RECOMMENDATIONS given include: Educated patient that we are awaiting covid-19 test results. During this time quarantine, self isolate and self monitor. Educated to call or report to the ER if having worsening shortness of breath, cough, high fever, or new symptoms. Patient understood these instructions..            Orders:       [id number]  COVID 19 Testing  (Send-Out)              Acute suppurative otitis media without spontaneous rupture of ear drum, left ear          Prescriptions:       [New Rx] Augmentin 875-125 mg oral tablet [take 1 tablet by oral route every 12 hours for 10 days], #20 (twenty) tablets, Refills: 0 (zero)         Acute frontal sinusitis, unspecified        RECOMMENDATIONS given include: Patient treated for sinus infection at this time. Treated with Augmentin and educated to take OTC mucinex. Hydration was stressed to the patient. Supportive care including rest, fluids and mucus controll discussed. Patient was educated on proper antibiotics use. Patient to call with any new or worsening symptoms..              Charge Capture:         Primary Diagnosis:     R05  Cough           Orders:      [id number]  Office/outpatient visit; established patient, level 3  (In-House)              H66.002  Acute suppurative otitis media without spontaneous rupture of ear drum, left ear     J01.10  Acute frontal sinusitis, unspecified         ADDENDUMS:      ____________________________________    Addendum: 11/16/2020 09:42 AM - Kacie Vincent        ROS: +Fever
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
## 2021-07-01 VITALS
HEART RATE: 59 BPM | BODY MASS INDEX: 38.87 KG/M2 | TEMPERATURE: 97.6 F | SYSTOLIC BLOOD PRESSURE: 128 MMHG | WEIGHT: 211.2 LBS | HEIGHT: 62 IN | DIASTOLIC BLOOD PRESSURE: 69 MMHG

## 2021-07-01 VITALS
SYSTOLIC BLOOD PRESSURE: 122 MMHG | BODY MASS INDEX: 38.13 KG/M2 | TEMPERATURE: 98.4 F | DIASTOLIC BLOOD PRESSURE: 64 MMHG | HEIGHT: 62 IN | WEIGHT: 207.2 LBS | HEART RATE: 56 BPM

## 2021-07-01 VITALS
TEMPERATURE: 98.5 F | DIASTOLIC BLOOD PRESSURE: 80 MMHG | BODY MASS INDEX: 37.5 KG/M2 | WEIGHT: 203.8 LBS | SYSTOLIC BLOOD PRESSURE: 130 MMHG | HEART RATE: 56 BPM | HEIGHT: 62 IN

## 2021-07-01 VITALS
TEMPERATURE: 98.2 F | SYSTOLIC BLOOD PRESSURE: 141 MMHG | HEIGHT: 62 IN | DIASTOLIC BLOOD PRESSURE: 58 MMHG | WEIGHT: 209.4 LBS | HEART RATE: 63 BPM | BODY MASS INDEX: 38.53 KG/M2

## 2021-07-01 VITALS
HEIGHT: 62 IN | OXYGEN SATURATION: 99 % | HEART RATE: 58 BPM | SYSTOLIC BLOOD PRESSURE: 150 MMHG | BODY MASS INDEX: 38.76 KG/M2 | TEMPERATURE: 97.8 F | DIASTOLIC BLOOD PRESSURE: 78 MMHG | WEIGHT: 210.6 LBS

## 2021-07-01 VITALS
HEART RATE: 63 BPM | BODY MASS INDEX: 39.23 KG/M2 | WEIGHT: 213.2 LBS | SYSTOLIC BLOOD PRESSURE: 144 MMHG | TEMPERATURE: 98.8 F | DIASTOLIC BLOOD PRESSURE: 61 MMHG | HEIGHT: 62 IN

## 2021-07-01 VITALS
TEMPERATURE: 98.4 F | WEIGHT: 205.4 LBS | DIASTOLIC BLOOD PRESSURE: 63 MMHG | SYSTOLIC BLOOD PRESSURE: 137 MMHG | HEART RATE: 58 BPM | BODY MASS INDEX: 37.8 KG/M2 | HEIGHT: 62 IN

## 2021-07-01 VITALS
WEIGHT: 210.6 LBS | HEART RATE: 65 BPM | HEIGHT: 62 IN | BODY MASS INDEX: 38.76 KG/M2 | SYSTOLIC BLOOD PRESSURE: 148 MMHG | DIASTOLIC BLOOD PRESSURE: 69 MMHG | TEMPERATURE: 98.5 F

## 2021-07-01 VITALS
HEIGHT: 62 IN | SYSTOLIC BLOOD PRESSURE: 133 MMHG | TEMPERATURE: 97.3 F | DIASTOLIC BLOOD PRESSURE: 74 MMHG | BODY MASS INDEX: 37.47 KG/M2 | HEART RATE: 54 BPM | WEIGHT: 203.6 LBS

## 2021-07-01 VITALS
TEMPERATURE: 97.8 F | WEIGHT: 212 LBS | SYSTOLIC BLOOD PRESSURE: 137 MMHG | HEART RATE: 55 BPM | HEIGHT: 62 IN | BODY MASS INDEX: 39.01 KG/M2 | DIASTOLIC BLOOD PRESSURE: 61 MMHG

## 2021-07-01 VITALS
WEIGHT: 205.4 LBS | TEMPERATURE: 98.1 F | BODY MASS INDEX: 37.8 KG/M2 | HEIGHT: 62 IN | HEART RATE: 60 BPM | DIASTOLIC BLOOD PRESSURE: 65 MMHG | SYSTOLIC BLOOD PRESSURE: 145 MMHG

## 2021-07-02 VITALS
TEMPERATURE: 97.1 F | HEIGHT: 62 IN | BODY MASS INDEX: 37.58 KG/M2 | DIASTOLIC BLOOD PRESSURE: 60 MMHG | HEART RATE: 56 BPM | WEIGHT: 204.2 LBS | SYSTOLIC BLOOD PRESSURE: 146 MMHG

## 2021-07-02 VITALS
SYSTOLIC BLOOD PRESSURE: 159 MMHG | TEMPERATURE: 97.8 F | DIASTOLIC BLOOD PRESSURE: 74 MMHG | HEIGHT: 62 IN | HEART RATE: 59 BPM | BODY MASS INDEX: 37.96 KG/M2

## 2021-07-02 VITALS
SYSTOLIC BLOOD PRESSURE: 155 MMHG | WEIGHT: 201.2 LBS | HEART RATE: 56 BPM | HEIGHT: 62 IN | TEMPERATURE: 96.3 F | DIASTOLIC BLOOD PRESSURE: 56 MMHG | BODY MASS INDEX: 37.02 KG/M2

## 2021-07-02 VITALS
DIASTOLIC BLOOD PRESSURE: 71 MMHG | SYSTOLIC BLOOD PRESSURE: 138 MMHG | HEART RATE: 54 BPM | TEMPERATURE: 98.1 F | HEIGHT: 62 IN | BODY MASS INDEX: 38.09 KG/M2 | WEIGHT: 207 LBS

## 2021-07-02 VITALS
BODY MASS INDEX: 36.73 KG/M2 | HEIGHT: 62 IN | HEART RATE: 58 BPM | SYSTOLIC BLOOD PRESSURE: 134 MMHG | DIASTOLIC BLOOD PRESSURE: 46 MMHG | TEMPERATURE: 97.9 F | WEIGHT: 199.6 LBS

## 2021-07-02 VITALS
WEIGHT: 200 LBS | SYSTOLIC BLOOD PRESSURE: 134 MMHG | BODY MASS INDEX: 36.8 KG/M2 | TEMPERATURE: 97.7 F | DIASTOLIC BLOOD PRESSURE: 78 MMHG | HEART RATE: 63 BPM | HEIGHT: 62 IN

## 2021-07-14 PROBLEM — I10 ESSENTIAL (PRIMARY) HYPERTENSION: Status: ACTIVE | Noted: 2021-07-14

## 2021-07-14 PROBLEM — F41.9 ANXIETY DISORDER, UNSPECIFIED: Status: ACTIVE | Noted: 2021-07-14

## 2021-07-14 PROBLEM — Z91.81 HISTORY OF FALLING: Status: ACTIVE | Noted: 2021-07-14

## 2021-07-14 PROBLEM — R22.31 MASS OF WRIST, RIGHT: Status: ACTIVE | Noted: 2021-01-07

## 2021-07-14 PROBLEM — Z79.899 OTHER LONG TERM (CURRENT) DRUG THERAPY: Status: ACTIVE | Noted: 2021-07-14

## 2021-07-14 RX ORDER — FLUTICASONE PROPIONATE 50 MCG
2 SPRAY, SUSPENSION (ML) NASAL DAILY
COMMUNITY
End: 2022-01-13

## 2021-07-14 RX ORDER — CYCLOBENZAPRINE HCL 5 MG
5 TABLET ORAL 3 TIMES DAILY PRN
COMMUNITY
End: 2022-11-16 | Stop reason: SDUPTHER

## 2021-07-15 ENCOUNTER — OFFICE VISIT (OUTPATIENT)
Dept: FAMILY MEDICINE CLINIC | Age: 76
End: 2021-07-15

## 2021-07-15 VITALS
SYSTOLIC BLOOD PRESSURE: 132 MMHG | HEIGHT: 63 IN | TEMPERATURE: 98.3 F | HEART RATE: 55 BPM | WEIGHT: 200.2 LBS | OXYGEN SATURATION: 96 % | BODY MASS INDEX: 35.47 KG/M2 | DIASTOLIC BLOOD PRESSURE: 57 MMHG

## 2021-07-15 DIAGNOSIS — I10 ESSENTIAL (PRIMARY) HYPERTENSION: ICD-10-CM

## 2021-07-15 DIAGNOSIS — Z79.899 OTHER LONG TERM (CURRENT) DRUG THERAPY: Primary | ICD-10-CM

## 2021-07-15 DIAGNOSIS — F41.9 ANXIETY DISORDER, UNSPECIFIED TYPE: ICD-10-CM

## 2021-07-15 LAB
AMPHET+METHAMPHET UR QL: NEGATIVE
AMPHETAMINES UR QL: NEGATIVE
BARBITURATES UR QL SCN: NEGATIVE
BENZODIAZ UR QL SCN: NEGATIVE
BUPRENORPHINE SERPL-MCNC: NEGATIVE NG/ML
CANNABINOIDS SERPL QL: NEGATIVE
COCAINE UR QL: NEGATIVE
EXPIRATION DATE: NORMAL
Lab: NORMAL
MDMA UR QL SCN: NEGATIVE
METHADONE UR QL SCN: NEGATIVE
OPIATES UR QL: NEGATIVE
OXYCODONE UR QL SCN: NEGATIVE
PCP UR QL SCN: NEGATIVE

## 2021-07-15 PROCEDURE — 80305 DRUG TEST PRSMV DIR OPT OBS: CPT | Performed by: NURSE PRACTITIONER

## 2021-07-15 PROCEDURE — 99213 OFFICE O/P EST LOW 20 MIN: CPT | Performed by: NURSE PRACTITIONER

## 2021-07-15 RX ORDER — FLUOROMETHOLONE 0.1 %
1 SUSPENSION, DROPS(FINAL DOSAGE FORM)(ML) OPHTHALMIC (EYE) DAILY
COMMUNITY
Start: 2021-06-23

## 2021-07-15 NOTE — PROGRESS NOTES
"Chief Complaint  Hypertension (informed needs to see dr melgar for control refill & primary HTN  FU)    Subjective          Yanni Huynh presents to [facility] for blood pressure medication refill.  Patient normally sees Dr. Melgar.  Has an appointment scheduled with her on 830.  She states she has enough anxiety medication to last until then.  We will go ahead and do urine drug screen today.  Also complaining of right wrist pain x 2 weeks. Using         Objective   Vital Signs:   /57   Pulse 55   Temp 98.3 °F (36.8 °C)   Ht 160 cm (63\")   Wt 90.8 kg (200 lb 3.2 oz)   SpO2 96%   BMI 35.46 kg/m²     Physical Exam  Vitals reviewed.   Constitutional:       Appearance: Normal appearance. She is well-developed.   HENT:      Head: Normocephalic and atraumatic.   Eyes:      Conjunctiva/sclera: Conjunctivae normal.      Pupils: Pupils are equal, round, and reactive to light.   Neck:      Vascular: No carotid bruit.   Cardiovascular:      Rate and Rhythm: Normal rate and regular rhythm.      Heart sounds: Normal heart sounds. No murmur heard.     Pulmonary:      Effort: Pulmonary effort is normal. No respiratory distress.      Breath sounds: Normal breath sounds.   Musculoskeletal:      Cervical back: Full passive range of motion without pain.      Right lower leg: No edema.      Left lower leg: No edema.   Skin:     General: Skin is warm and dry.   Neurological:      Mental Status: She is alert and oriented to person, place, and time.   Psychiatric:         Mood and Affect: Mood and affect normal.         Behavior: Behavior normal.         Thought Content: Thought content normal.         Judgment: Judgment normal.          Result Review :              Assessment and Plan    Diagnoses and all orders for this visit:    1. Other long term (current) drug therapy (Primary)  -     POC Urine Drug Screen [facility]    2. Anxiety disorder, unspecified type    3. Essential " (primary) hypertension    Patient to continue to take his Lexapro and Xanax as needed.  Patient to keep follow-up with Dr. Melgar on the third.  Will defer lab testing at this time to PCP.  Refills provided.  Patient to notify office with any acute concerns or issues.  Patient verbalizes understanding, agrees with plan of care and has no further questions upon discharge.    Please note that portions of this note were completed with a voice recognition program.      Follow Up    Return for Next scheduled follow up with PCP.  Patient was given instructions and counseling regarding her condition or for health maintenance advice. Please see specific information pulled into the AVS if appropriate.

## 2021-07-17 RX ORDER — ESCITALOPRAM OXALATE 10 MG/1
TABLET ORAL
Qty: 45 TABLET | Refills: 0 | Status: SHIPPED | OUTPATIENT
Start: 2021-07-17 | End: 2021-10-26

## 2021-08-03 ENCOUNTER — OFFICE VISIT (OUTPATIENT)
Dept: FAMILY MEDICINE CLINIC | Age: 76
End: 2021-08-03

## 2021-08-03 VITALS
WEIGHT: 198 LBS | DIASTOLIC BLOOD PRESSURE: 70 MMHG | BODY MASS INDEX: 35.08 KG/M2 | TEMPERATURE: 98.3 F | HEART RATE: 88 BPM | HEIGHT: 63 IN | SYSTOLIC BLOOD PRESSURE: 147 MMHG

## 2021-08-03 DIAGNOSIS — F51.01 PRIMARY INSOMNIA: ICD-10-CM

## 2021-08-03 DIAGNOSIS — I10 ESSENTIAL (PRIMARY) HYPERTENSION: ICD-10-CM

## 2021-08-03 DIAGNOSIS — F41.9 ANXIETY DISORDER, UNSPECIFIED TYPE: Primary | ICD-10-CM

## 2021-08-03 DIAGNOSIS — Z79.899 OTHER LONG TERM (CURRENT) DRUG THERAPY: ICD-10-CM
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
## 2021-08-03 PROBLEM — S89.91XA RIGHT KNEE INJURY, INITIAL ENCOUNTER: Status: RESOLVED | Noted: 2020-07-23 | Resolved: 2021-08-03

## 2021-08-03 LAB
AMPHET+METHAMPHET UR QL: NEGATIVE
AMPHETAMINES UR QL: NEGATIVE
BARBITURATES UR QL SCN: NEGATIVE
BENZODIAZ UR QL SCN: POSITIVE
BUPRENORPHINE SERPL-MCNC: NEGATIVE NG/ML
CANNABINOIDS SERPL QL: NEGATIVE
COCAINE UR QL: NEGATIVE
EXPIRATION DATE: ABNORMAL
Lab: ABNORMAL
MDMA UR QL SCN: NEGATIVE
METHADONE UR QL SCN: NEGATIVE
OPIATES UR QL: NEGATIVE
OXYCODONE UR QL SCN: NEGATIVE
PCP UR QL SCN: NEGATIVE

## 2021-08-03 PROCEDURE — 99214 OFFICE O/P EST MOD 30 MIN: CPT | Performed by: FAMILY MEDICINE

## 2021-08-03 PROCEDURE — 80305 DRUG TEST PRSMV DIR OPT OBS: CPT | Performed by: FAMILY MEDICINE

## 2021-08-03 NOTE — ASSESSMENT & PLAN NOTE
Stable on current regimen.  Symptoms are well controlled.  No adverse effects. She does require ongoing use of this controlled substance to function.  Tox screen was due today.  Prior tox screen appropriate.  Kobi was run today.  Refills were not needed today.  RTC 3 months.

## 2021-08-03 NOTE — PROGRESS NOTES
"Chief Complaint  Insomnia (follow up visit )    Subjective          Yanni Huynh presents to [facility] today for routine f/u of chronic issues.    She is on Lexapro for anxiety. Anxiety and panic attacks have been well controlled.  Mood is \"good.\"    She denies adverse effects.  She is taking both lorazepam and melatonin both for insomnia.  She has been on the lorazepam for many years now.  She saw Dr. Demetrius Romero PhD, sleep therapy, in 2015 who worked with her to significantly decrease the amount of medication she was using, and this was very helpful, but she was unable to stop the lorazepam altogether.  She has tried trazodone and Ambien in the past but had intolerable side effects with both.  Her  has had a lot of problems with his heart lately, including a reported 7 vessel CABG and development of atrial fib.      She is on atenolol for HTN.    Her BP has been well controlled at home.  It has been 119-135/68-75.  She and her  are both on a heart healthy diet now -- she is doing it with him.  She has cut down on her salt.  She denies CP, palpitations, or SOB.       She is on ibuprofen and topical diclofenac for L shoulder pain from a torn rotator cuff.  She follows with Dr. Dela Cruz.       Current Outpatient Medications:   •  atenolol (TENORMIN) 25 MG tablet, Take 31.25 mg by mouth Daily., Disp: , Rfl:   •  cyclobenzaprine (FLEXERIL) 5 MG tablet, Take 5 mg by mouth 3 (Three) Times a Day As Needed., Disp: , Rfl:   •  escitalopram (LEXAPRO) 10 MG tablet, Take 1/2 (one-half) tablet by mouth once daily, Disp: 45 tablet, Rfl: 0  •  fluorometholone (FML) 0.1 % ophthalmic suspension, Administer 1 drop to both eyes Daily., Disp: , Rfl:   •  fluticasone (FLONASE) 50 MCG/ACT nasal spray, 2 sprays into the nostril(s) as directed by provider Daily., Disp: , Rfl:   •  IBUPROFEN PO, Take 200 mg by mouth., Disp: , Rfl:   •  LORazepam (ATIVAN) 1 MG tablet, Take 1 mg by mouth At " "Night As Needed., Disp: , Rfl:   •  Melatonin 1 MG capsule, Take  by mouth., Disp: , Rfl:   •  Misc Natural Products (CORTISOL MANAGER PO), Take  by mouth., Disp: , Rfl:   •  Multiple Vitamins-Minerals (PRESERVISION AREDS PO), Take 1 capsule by mouth., Disp: , Rfl:   •  pilocarpine (PILOCAR) 1 % ophthalmic solution, , Disp: , Rfl:   •  prednisoLONE acetate (PRED FORTE) 1 % ophthalmic suspension, , Disp: , Rfl:   •  bacitracin 500 UNIT/GM ophthalmic ointment, , Disp: , Rfl:     Allergies:  Patient has no known allergies.      Objective   Vital Signs:   /69 (BP Location: Right arm, Patient Position: Sitting)   Pulse 58   Temp 98.3 °F (36.8 °C) (Oral)   Ht 160 cm (63\")   Wt 89.8 kg (198 lb)   BMI 35.07 kg/m²     Physical Exam  Vitals reviewed.   Constitutional:       General: She is not in acute distress.     Appearance: Normal appearance. She is well-developed.   HENT:      Head: Normocephalic and atraumatic.      Right Ear: External ear normal.      Left Ear: External ear normal.      Nose: Nose normal.      Mouth/Throat:      Mouth: Mucous membranes are moist.   Eyes:      Extraocular Movements: Extraocular movements intact.      Conjunctiva/sclera: Conjunctivae normal.      Pupils: Pupils are equal, round, and reactive to light.   Cardiovascular:      Rate and Rhythm: Normal rate and regular rhythm.      Heart sounds: No murmur heard.     Pulmonary:      Effort: Pulmonary effort is normal.      Breath sounds: Normal breath sounds. No wheezing, rhonchi or rales.   Abdominal:      General: Bowel sounds are normal. There is no distension.      Palpations: Abdomen is soft.      Tenderness: There is no abdominal tenderness.   Musculoskeletal:         General: Normal range of motion.   Neurological:      Mental Status: She is alert.   Psychiatric:         Mood and Affect: Affect normal.             Assessment and Plan    Diagnoses and all orders for this visit:    1. Anxiety disorder, unspecified type " (Primary)  Assessment & Plan:  Stable on current regimen.  Symptoms are well controlled.  No adverse effects. She does require ongoing use of this controlled substance to function.  Tox screen was due today.  Prior tox screen appropriate.  Kobi was run today.  Refills were not needed today.  RTC 3 months.        2. Primary insomnia  Assessment & Plan:  As per anxiety.      3. Other long term (current) drug therapy  -     POC Urine Drug Screen [facility]    4. Essential (primary) hypertension  Assessment & Plan:  BP looks great at home.  No changes to meds today.  No refills needed.  Labs reviewed and UTD.        Follow Up   Return in about 3 months (around 11/3/2021) for Recheck.  Patient was given instructions and counseling regarding her condition or for health maintenance advice. Please see specific information pulled into the AVS if appropriate.

## 2021-10-26 RX ORDER — ESCITALOPRAM OXALATE 10 MG/1
TABLET ORAL
Qty: 45 TABLET | Refills: 0 | Status: SHIPPED | OUTPATIENT
Start: 2021-10-26 | End: 2022-01-26 | Stop reason: SDUPTHER

## 2021-11-03 ENCOUNTER — OFFICE VISIT (OUTPATIENT)
Dept: FAMILY MEDICINE CLINIC | Age: 76
End: 2021-11-03

## 2021-11-03 VITALS
DIASTOLIC BLOOD PRESSURE: 58 MMHG | SYSTOLIC BLOOD PRESSURE: 139 MMHG | HEIGHT: 63 IN | BODY MASS INDEX: 35.33 KG/M2 | WEIGHT: 199.4 LBS | HEART RATE: 50 BPM | TEMPERATURE: 97.9 F

## 2021-11-03 DIAGNOSIS — I10 ESSENTIAL (PRIMARY) HYPERTENSION: ICD-10-CM

## 2021-11-03 DIAGNOSIS — F41.9 ANXIETY: ICD-10-CM

## 2021-11-03 DIAGNOSIS — Z79.899 OTHER LONG TERM (CURRENT) DRUG THERAPY: ICD-10-CM

## 2021-11-03 DIAGNOSIS — F51.01 PRIMARY INSOMNIA: Primary | ICD-10-CM

## 2021-11-03 PROBLEM — Z94.7 CORNEAL TRANSPLANT STATUS: Status: ACTIVE | Noted: 2021-08-23

## 2021-11-03 PROBLEM — H35.3130 BILATERAL NONEXUDATIVE AGE-RELATED MACULAR DEGENERATION: Status: ACTIVE | Noted: 2021-08-23

## 2021-11-03 PROCEDURE — 80305 DRUG TEST PRSMV DIR OPT OBS: CPT | Performed by: FAMILY MEDICINE

## 2021-11-03 PROCEDURE — G0480 DRUG TEST DEF 1-7 CLASSES: HCPCS | Performed by: FAMILY MEDICINE

## 2021-11-03 PROCEDURE — 99214 OFFICE O/P EST MOD 30 MIN: CPT | Performed by: FAMILY MEDICINE

## 2021-11-03 RX ORDER — ATENOLOL 25 MG/1
TABLET ORAL
Qty: 135 TABLET | Refills: 1 | Status: SHIPPED | OUTPATIENT
Start: 2021-11-03 | End: 2022-05-10 | Stop reason: SDUPTHER

## 2021-11-03 NOTE — ASSESSMENT & PLAN NOTE
Stable on current regimen.  She is actually cutting back a bit on how much lorazepam she is using.  Symptoms are well controlled.  No adverse effects. She does require ongoing use of this controlled substance to function.  Tox screen was due today.  Prior tox screen appropriate.  Kobi was run today.  Refills were not needed today.  RTC 3 months.

## 2021-11-03 NOTE — ASSESSMENT & PLAN NOTE
Stable on her escitalopram.  It seems to be working well.  No refills needed today.  Continue to monitor.

## 2021-11-03 NOTE — PROGRESS NOTES
"Chief Complaint  Anxiety, Insomnia, and Hypertension    Subjective          Yanni Huynh presents to [facility] today for routine f/u on chronic issues.    She is on escitalopram for anxiety.  Symptoms of anxiety and panic attacks have been well controlled.    Her mood is \"pretty good.\"   She is on lorazepam and melatonin for insomnia.  She has been on the lorazepam for many years.  She saw Demetrius Romero for sleep therapy in 2015 who worked with her to significantly decrease the amount of medication she was using, and this was very helpful, but she was unable to stop the lorazepam altogether.  She has tried trazodone and Ambien previously but had intolerable side effects with both.  She has a lot of anxiety regarding her  Star's health.        She is on atenolol for HTN.    Her BP has been well controlled.  She does check it regularly at home.  No CP, palpitations, SOB.       She is on ibuprofen and topical diclofenac for L shoulder pain from a torn rotator cuff.  Following with Dr. Darshan Dela Cruz. \"It hurts but it's not excruciating.\"        Current Outpatient Medications:   •  atenolol (TENORMIN) 25 MG tablet, 1 tablet PO QAM and 1/4 tab PO QHS, Disp: 135 tablet, Rfl: 1  •  cyclobenzaprine (FLEXERIL) 5 MG tablet, Take 5 mg by mouth 3 (Three) Times a Day As Needed., Disp: , Rfl:   •  escitalopram (LEXAPRO) 10 MG tablet, Take 1/2 (one-half) tablet by mouth once daily, Disp: 45 tablet, Rfl: 0  •  fluorometholone (FML) 0.1 % ophthalmic suspension, Administer 1 drop to both eyes Daily., Disp: , Rfl:   •  fluticasone (FLONASE) 50 MCG/ACT nasal spray, 2 sprays into the nostril(s) as directed by provider Daily., Disp: , Rfl:   •  IBUPROFEN PO, Take 200 mg by mouth., Disp: , Rfl:   •  LORazepam (ATIVAN) 1 MG tablet, Take 1 mg by mouth At Night As Needed., Disp: , Rfl:   •  Melatonin 1 MG capsule, Take  by mouth., Disp: , Rfl:   •  Misc Natural Products (CORTISOL MANAGER PO), Take  " "by mouth., Disp: , Rfl:   •  Multiple Vitamins-Minerals (PRESERVISION AREDS PO), Take 1 capsule by mouth., Disp: , Rfl:   •  pilocarpine (PILOCAR) 1 % ophthalmic solution, , Disp: , Rfl:   •  prednisoLONE acetate (PRED FORTE) 1 % ophthalmic suspension, , Disp: , Rfl:     Allergies:  Patient has no known allergies.      Objective   Vital Signs:   /65 (BP Location: Left arm, Patient Position: Sitting)   Pulse 53   Temp 97.9 °F (36.6 °C) (Oral)   Ht 160 cm (63\")   Wt 90.4 kg (199 lb 6.4 oz)   BMI 35.32 kg/m²     Physical Exam  Vitals reviewed.   Constitutional:       General: She is not in acute distress.     Appearance: Normal appearance. She is well-developed.   HENT:      Head: Normocephalic and atraumatic.      Right Ear: External ear normal.      Left Ear: External ear normal.      Nose: Nose normal.      Mouth/Throat:      Mouth: Mucous membranes are moist.   Eyes:      Extraocular Movements: Extraocular movements intact.      Conjunctiva/sclera: Conjunctivae normal.      Pupils: Pupils are equal, round, and reactive to light.   Cardiovascular:      Rate and Rhythm: Normal rate and regular rhythm.      Heart sounds: No murmur heard.      Pulmonary:      Effort: Pulmonary effort is normal.      Breath sounds: Normal breath sounds. No wheezing, rhonchi or rales.   Abdominal:      General: Bowel sounds are normal. There is no distension.      Palpations: Abdomen is soft.      Tenderness: There is no abdominal tenderness.   Musculoskeletal:         General: Normal range of motion.   Neurological:      Mental Status: She is alert.   Psychiatric:         Mood and Affect: Affect normal.             Assessment and Plan    Diagnoses and all orders for this visit:    1. Primary insomnia (Primary)  Assessment & Plan:  Stable on current regimen.  She is actually cutting back a bit on how much lorazepam she is using.  Symptoms are well controlled.  No adverse effects. She does require ongoing use of this controlled " substance to function.  Tox screen was due today.  Prior tox screen appropriate.  Kobi was run today.  Refills were not needed today.  RTC 3 months.        2. Other long term (current) drug therapy  -     POC Urine Drug Screen [facility]    3. Anxiety  Assessment & Plan:  Stable on her escitalopram.  It seems to be working well.  No refills needed today.  Continue to monitor.      4. Essential (primary) hypertension  Assessment & Plan:  Blood pressure stable.  Refill sent.  Labs reviewed and up-to-date.    Orders:  -     atenolol (TENORMIN) 25 MG tablet; 1 tablet PO QAM and 1/4 tab PO QHS  Dispense: 135 tablet; Refill: 1      Follow Up   Return in about 3 months (around 2/3/2022) for Medicare Wellness.  Patient was given instructions and counseling regarding her condition or for health maintenance advice. Please see specific information pulled into the AVS if appropriate.

## 2021-11-13 LAB — BENZODIAZ UR QL: NEGATIVE

## 2021-11-17 DIAGNOSIS — F41.9 ANXIETY: Primary | ICD-10-CM

## 2021-11-17 RX ORDER — LORAZEPAM 1 MG/1
TABLET ORAL
Qty: 30 TABLET | Refills: 2 | Status: SHIPPED | OUTPATIENT
Start: 2021-11-17 | End: 2022-06-16

## 2022-01-13 ENCOUNTER — HOSPITAL ENCOUNTER (OUTPATIENT)
Dept: GENERAL RADIOLOGY | Facility: HOSPITAL | Age: 77
Discharge: HOME | End: 2022-01-13

## 2022-01-13 ENCOUNTER — OFFICE VISIT (OUTPATIENT)
Dept: FAMILY MEDICINE CLINIC | Age: 77
End: 2022-01-13

## 2022-01-13 ENCOUNTER — LAB (OUTPATIENT)
Dept: LAB | Facility: HOSPITAL | Age: 77
End: 2022-01-13

## 2022-01-13 VITALS
HEART RATE: 94 BPM | WEIGHT: 200 LBS | DIASTOLIC BLOOD PRESSURE: 77 MMHG | SYSTOLIC BLOOD PRESSURE: 150 MMHG | HEIGHT: 63 IN | TEMPERATURE: 97.9 F | BODY MASS INDEX: 35.44 KG/M2

## 2022-01-13 DIAGNOSIS — R10.9 RIGHT SIDED ABDOMINAL PAIN: ICD-10-CM

## 2022-01-13 DIAGNOSIS — R10.9 RIGHT SIDED ABDOMINAL PAIN: Primary | ICD-10-CM

## 2022-01-13 LAB
ALBUMIN SERPL-MCNC: 4.5 G/DL (ref 3.5–5.2)
ALBUMIN/GLOB SERPL: 1.5 G/DL
ALP SERPL-CCNC: 92 U/L (ref 39–117)
ALT SERPL W P-5'-P-CCNC: 17 U/L (ref 1–33)
ANION GAP SERPL CALCULATED.3IONS-SCNC: 10.1 MMOL/L (ref 5–15)
AST SERPL-CCNC: 14 U/L (ref 1–32)
BACTERIA UR QL AUTO: ABNORMAL /HPF
BASOPHILS # BLD AUTO: 0.04 10*3/MM3 (ref 0–0.2)
BASOPHILS NFR BLD AUTO: 0.5 % (ref 0–1.5)
BILIRUB SERPL-MCNC: 0.6 MG/DL (ref 0–1.2)
BILIRUB UR QL STRIP: NEGATIVE
BUN SERPL-MCNC: 20 MG/DL (ref 8–23)
BUN/CREAT SERPL: 33.3 (ref 7–25)
CALCIUM SPEC-SCNC: 9.7 MG/DL (ref 8.6–10.5)
CHLORIDE SERPL-SCNC: 101 MMOL/L (ref 98–107)
CLARITY UR: CLEAR
CO2 SERPL-SCNC: 25.9 MMOL/L (ref 22–29)
COLOR UR: YELLOW
CREAT SERPL-MCNC: 0.6 MG/DL (ref 0.57–1)
DEPRECATED RDW RBC AUTO: 38.2 FL (ref 37–54)
EOSINOPHIL # BLD AUTO: 0.3 10*3/MM3 (ref 0–0.4)
EOSINOPHIL NFR BLD AUTO: 3.8 % (ref 0.3–6.2)
ERYTHROCYTE [DISTWIDTH] IN BLOOD BY AUTOMATED COUNT: 12.5 % (ref 12.3–15.4)
GFR SERPL CREATININE-BSD FRML MDRD: 97 ML/MIN/1.73
GLOBULIN UR ELPH-MCNC: 3 GM/DL
GLUCOSE SERPL-MCNC: 118 MG/DL (ref 65–99)
GLUCOSE UR STRIP-MCNC: NEGATIVE MG/DL
HCT VFR BLD AUTO: 40.9 % (ref 34–46.6)
HGB BLD-MCNC: 14.2 G/DL (ref 12–15.9)
HGB UR QL STRIP.AUTO: NEGATIVE
IMM GRANULOCYTES # BLD AUTO: 0.02 10*3/MM3 (ref 0–0.05)
IMM GRANULOCYTES NFR BLD AUTO: 0.3 % (ref 0–0.5)
KETONES UR QL STRIP: NEGATIVE
LEUKOCYTE ESTERASE UR QL STRIP.AUTO: ABNORMAL
LYMPHOCYTES # BLD AUTO: 2.14 10*3/MM3 (ref 0.7–3.1)
LYMPHOCYTES NFR BLD AUTO: 27.3 % (ref 19.6–45.3)
MCH RBC QN AUTO: 28.9 PG (ref 26.6–33)
MCHC RBC AUTO-ENTMCNC: 34.7 G/DL (ref 31.5–35.7)
MCV RBC AUTO: 83.3 FL (ref 79–97)
MONOCYTES # BLD AUTO: 0.7 10*3/MM3 (ref 0.1–0.9)
MONOCYTES NFR BLD AUTO: 8.9 % (ref 5–12)
NEUTROPHILS NFR BLD AUTO: 4.64 10*3/MM3 (ref 1.7–7)
NEUTROPHILS NFR BLD AUTO: 59.2 % (ref 42.7–76)
NITRITE UR QL STRIP: NEGATIVE
PH UR STRIP.AUTO: 5.5 [PH] (ref 5–8)
PLATELET # BLD AUTO: 228 10*3/MM3 (ref 140–450)
PMV BLD AUTO: 10.2 FL (ref 6–12)
POTASSIUM SERPL-SCNC: 4.2 MMOL/L (ref 3.5–5.2)
PROT SERPL-MCNC: 7.5 G/DL (ref 6–8.5)
PROT UR QL STRIP: NEGATIVE
RBC # BLD AUTO: 4.91 10*6/MM3 (ref 3.77–5.28)
RBC # UR STRIP: ABNORMAL /HPF
REF LAB TEST METHOD: ABNORMAL
SODIUM SERPL-SCNC: 137 MMOL/L (ref 136–145)
SP GR UR STRIP: 1.01 (ref 1–1.03)
SQUAMOUS #/AREA URNS HPF: ABNORMAL /HPF
UROBILINOGEN UR QL STRIP: ABNORMAL
WBC # UR STRIP: ABNORMAL /HPF
WBC NRBC COR # BLD: 7.84 10*3/MM3 (ref 3.4–10.8)

## 2022-01-13 PROCEDURE — 36415 COLL VENOUS BLD VENIPUNCTURE: CPT

## 2022-01-13 PROCEDURE — 74018 RADEX ABDOMEN 1 VIEW: CPT

## 2022-01-13 PROCEDURE — 80053 COMPREHEN METABOLIC PANEL: CPT

## 2022-01-13 PROCEDURE — 85025 COMPLETE CBC W/AUTO DIFF WBC: CPT

## 2022-01-13 PROCEDURE — 99214 OFFICE O/P EST MOD 30 MIN: CPT | Performed by: FAMILY MEDICINE

## 2022-01-13 PROCEDURE — 81001 URINALYSIS AUTO W/SCOPE: CPT

## 2022-01-13 NOTE — ASSESSMENT & PLAN NOTE
Right-sided abdominal pain for the past 4 days associated with tenderness to palpation on exam and voluntary guarding.  She has history of both diverticulitis and kidney stones.  She does not note any other localizing or focal symptoms.  I am going to send her down to diagnostic center for labs including blood and urine as well as a KUB.  She has noted some mild constipation for the past few days but nothing terribly out of the ordinary.  She has had multiple abdominal surgeries with a large well-healed midline incision so hernia could also be a possibility.  Finally, musculoskeletal strain is on the differential as she has been moving a lot of boxes lately.  We will contact her with results when these return.

## 2022-01-13 NOTE — PROGRESS NOTES
"Chief Complaint  Abdominal Pain (x 4 days ago on right side)    Subjective          Yanni Huynh presents to [facility] today for right-sided abdominal pain for the past 4 days.  Pain started on Sunday night.  She has had some flares of the pain that are very severe.  She has baseline milder dull pain in between the flares.  It does feel better this morning so far with the exception of \"one hard pain.\"   Initially she thought it was MSK because she had been \"wrestling with some boxes.\"  However, it didn't ease up.  No obvious triggers although she had one episode of severe pain that started when she leaned down to pick something up.  Better with pressure to the area.  No nausea or diarrhea.  She has been a little constipated.    She has had kidney stones in the past.  She has also had diverticulitis that culminated in her losing 12 inches of bowel (to prevent recurrence).  S/p cholecystectomy.  She does not know that she has had appendectomy.        Current Outpatient Medications:   •  atenolol (TENORMIN) 25 MG tablet, 1 tablet PO QAM and 1/4 tab PO QHS, Disp: 135 tablet, Rfl: 1  •  cyclobenzaprine (FLEXERIL) 5 MG tablet, Take 5 mg by mouth 3 (Three) Times a Day As Needed., Disp: , Rfl:   •  escitalopram (LEXAPRO) 10 MG tablet, Take 1/2 (one-half) tablet by mouth once daily, Disp: 45 tablet, Rfl: 0  •  fluorometholone (FML) 0.1 % ophthalmic suspension, Administer 1 drop to both eyes Daily., Disp: , Rfl:   •  IBUPROFEN PO, Take 200 mg by mouth., Disp: , Rfl:   •  LORazepam (ATIVAN) 1 MG tablet, TAKE 1 TABLET BY MOUTH ONCE DAILY AT BEDTIME AS NEEDED (Patient taking differently: Take 0.5 mg by mouth.), Disp: 30 tablet, Rfl: 2  •  Melatonin 1 MG capsule, Take  by mouth., Disp: , Rfl:   •  Misc Natural Products (CORTISOL MANAGER PO), Take  by mouth., Disp: , Rfl:   •  Multiple Vitamins-Minerals (PRESERVISION AREDS PO), Take 1 capsule by mouth., Disp: , Rfl:   •  pilocarpine (PILOCAR) " "1 % ophthalmic solution, , Disp: , Rfl:     Allergies:  Patient has no known allergies.      Objective   Vital Signs:   /77 (BP Location: Left arm, Patient Position: Sitting)   Pulse 94   Temp 97.9 °F (36.6 °C) (Oral)   Ht 160 cm (63\")   Wt 90.7 kg (200 lb)   BMI 35.43 kg/m²     Physical Exam  Vitals reviewed.   Constitutional:       General: She is not in acute distress.     Appearance: Normal appearance. She is well-developed.   HENT:      Head: Normocephalic and atraumatic.      Right Ear: External ear normal.      Left Ear: External ear normal.      Nose: Nose normal.      Mouth/Throat:      Mouth: Mucous membranes are moist.   Eyes:      Extraocular Movements: Extraocular movements intact.      Conjunctiva/sclera: Conjunctivae normal.      Pupils: Pupils are equal, round, and reactive to light.   Cardiovascular:      Rate and Rhythm: Normal rate and regular rhythm.      Heart sounds: No murmur heard.      Pulmonary:      Effort: Pulmonary effort is normal.      Breath sounds: Normal breath sounds. No wheezing, rhonchi or rales.   Abdominal:      General: Bowel sounds are normal. There is no distension.      Palpations: Abdomen is soft.      Tenderness: There is abdominal tenderness (R-sided abdominal pain). There is guarding (voluntary). There is no rebound.      Hernia: No hernia is present.   Musculoskeletal:         General: Normal range of motion.   Skin:     General: Skin is warm and dry.   Neurological:      Mental Status: She is alert.   Psychiatric:         Mood and Affect: Affect normal.             Assessment and Plan    Diagnoses and all orders for this visit:    1. Right sided abdominal pain (Primary)  Assessment & Plan:  Right-sided abdominal pain for the past 4 days associated with tenderness to palpation on exam and voluntary guarding.  She has history of both diverticulitis and kidney stones.  She does not note any other localizing or focal symptoms.  I am going to send her down to " diagnostic center for labs including blood and urine as well as a KUB.  She has noted some mild constipation for the past few days but nothing terribly out of the ordinary.  She has had multiple abdominal surgeries with a large well-healed midline incision so hernia could also be a possibility.  Finally, musculoskeletal strain is on the differential as she has been moving a lot of boxes lately.  We will contact her with results when these return.    Orders:  -     Comprehensive Metabolic Panel; Future  -     CBC & Differential; Future  -     Urinalysis With Culture If Indicated -; Future  -     XR Abdomen KUB; Future      Follow Up   Return for as scheduled 2/9/22.  Patient was given instructions and counseling regarding her condition or for health maintenance advice. Please see specific information pulled into the AVS if appropriate.

## 2022-01-14 ENCOUNTER — TELEPHONE (OUTPATIENT)
Dept: FAMILY MEDICINE CLINIC | Age: 77
End: 2022-01-14

## 2022-01-14 RX ORDER — POLYETHYLENE GLYCOL 3350 17 G/17G
17 POWDER, FOR SOLUTION ORAL DAILY PRN
Qty: 527 G | Refills: 1 | Status: SHIPPED | OUTPATIENT
Start: 2022-01-14 | End: 2022-08-10

## 2022-01-14 NOTE — TELEPHONE ENCOUNTER
Caller: Yanni Huynh    Relationship to patient: Self    Best call back number: [phone]    Patient is needing: PATIENT IS REQUESTING LAB RESULTS FROM 01/13/2022, PLEASE ADVISE.  THANK YOU.

## 2022-01-26 RX ORDER — ESCITALOPRAM OXALATE 10 MG/1
5 TABLET ORAL DAILY
Qty: 45 TABLET | Refills: 1 | Status: SHIPPED | OUTPATIENT
Start: 2022-01-26 | End: 2022-05-10 | Stop reason: SDUPTHER

## 2022-01-28 RX ORDER — ESCITALOPRAM OXALATE 10 MG/1
TABLET ORAL
Qty: 45 TABLET | Refills: 0 | OUTPATIENT
Start: 2022-01-28

## 2022-02-09 ENCOUNTER — OFFICE VISIT (OUTPATIENT)
Dept: FAMILY MEDICINE CLINIC | Age: 77
End: 2022-02-09

## 2022-02-09 VITALS
WEIGHT: 199 LBS | DIASTOLIC BLOOD PRESSURE: 72 MMHG | HEIGHT: 63 IN | HEART RATE: 53 BPM | BODY MASS INDEX: 35.26 KG/M2 | SYSTOLIC BLOOD PRESSURE: 136 MMHG | TEMPERATURE: 98.1 F

## 2022-02-09 DIAGNOSIS — Z79.899 OTHER LONG TERM (CURRENT) DRUG THERAPY: ICD-10-CM

## 2022-02-09 DIAGNOSIS — K59.09 OTHER CONSTIPATION: ICD-10-CM

## 2022-02-09 DIAGNOSIS — F51.01 PRIMARY INSOMNIA: Primary | ICD-10-CM

## 2022-02-09 DIAGNOSIS — I10 ESSENTIAL (PRIMARY) HYPERTENSION: ICD-10-CM

## 2022-02-09 PROCEDURE — G0480 DRUG TEST DEF 1-7 CLASSES: HCPCS | Performed by: FAMILY MEDICINE

## 2022-02-09 PROCEDURE — 99214 OFFICE O/P EST MOD 30 MIN: CPT | Performed by: FAMILY MEDICINE

## 2022-02-09 PROCEDURE — 80305 DRUG TEST PRSMV DIR OPT OBS: CPT | Performed by: FAMILY MEDICINE

## 2022-02-09 NOTE — PROGRESS NOTES
"Chief Complaint  Insomnia (3 month follow up )    Subjective          Yanni Huynh presents to [facility] today for follow-up on routine issues.    She is on escitalopram for anxiety.  Symptoms of anxiety and panic attacks have been well controlled.   Her mood is \"pretty good.\"   She is on lorazepam and melatonin for insomnia.  She has been on the lorazepam for many years.  She saw Demetrius Romero for sleep therapy in 2015 who worked with her to significantly decrease the amount of medication she was using, and this was very helpful, but she was unable to stop the lorazepam altogether.  She has tried trazodone and Ambien previously but had intolerable side effects with both.  She has a lot of anxiety regarding her  Star's health.        She is on atenolol for hypertension.  Her blood pressure has been well controlled.  She does check it regularly at home.  She denies chest pain, palpitations, or shortness of breath.       She is on ibuprofen and topical diclofenac for left shoulder pain from a torn rotator cuff.  Follows with Dr. Dela Cruz. \"It hurts but it's not excruciating.\"      She has gotten over a recent bout of constipation that she was telling me about at her 's last visit.  She had been eating a lot of nuts, so she stopped that.  She tried a Miralax as suggested and went back on the Bran Buds that she has used previously.        Current Outpatient Medications:   •  atenolol (TENORMIN) 25 MG tablet, 1 tablet PO QAM and 1/4 tab PO QHS, Disp: 135 tablet, Rfl: 1  •  cyclobenzaprine (FLEXERIL) 5 MG tablet, Take 5 mg by mouth 3 (Three) Times a Day As Needed., Disp: , Rfl:   •  escitalopram (LEXAPRO) 10 MG tablet, Take 0.5 tablets by mouth Daily., Disp: 45 tablet, Rfl: 1  •  fluorometholone (FML) 0.1 % ophthalmic suspension, Administer 1 drop to both eyes Daily., Disp: , Rfl:   •  IBUPROFEN PO, Take 200 mg by mouth As Needed., Disp: , Rfl:   •  LORazepam (ATIVAN) 1 MG " "tablet, TAKE 1 TABLET BY MOUTH ONCE DAILY AT BEDTIME AS NEEDED (Patient taking differently: Take 0.5 mg by mouth.), Disp: 30 tablet, Rfl: 2  •  Melatonin 1 MG capsule, Take 1.5 mg by mouth Every Night., Disp: , Rfl:   •  Misc Natural Products (CORTISOL MANAGER PO), Take  by mouth Every Night., Disp: , Rfl:   •  Multiple Vitamins-Minerals (PRESERVISION AREDS PO), Take 1 capsule by mouth Daily., Disp: , Rfl:   •  pilocarpine (PILOCAR) 1 % ophthalmic solution, , Disp: , Rfl:   •  polyethylene glycol (GlycoLax) 17 GM/SCOOP powder, Take 17 g by mouth Daily As Needed (constipation)., Disp: 527 g, Rfl: 1    Allergies:  Patient has no known allergies.      Objective   Vital Signs:   /72 (BP Location: Right arm, Patient Position: Sitting)   Pulse 53   Temp 98.1 °F (36.7 °C) (Oral)   Ht 160 cm (63\")   Wt 90.3 kg (199 lb)   BMI 35.25 kg/m²     Physical Exam  Vitals reviewed.   Constitutional:       General: She is not in acute distress.     Appearance: Normal appearance. She is well-developed.   HENT:      Head: Normocephalic and atraumatic.      Right Ear: External ear normal.      Left Ear: External ear normal.      Nose: Nose normal.      Mouth/Throat:      Mouth: Mucous membranes are moist.   Eyes:      Extraocular Movements: Extraocular movements intact.      Conjunctiva/sclera: Conjunctivae normal.      Pupils: Pupils are equal, round, and reactive to light.   Cardiovascular:      Rate and Rhythm: Normal rate and regular rhythm.      Heart sounds: No murmur heard.      Pulmonary:      Effort: Pulmonary effort is normal.      Breath sounds: Normal breath sounds. No wheezing, rhonchi or rales.   Abdominal:      General: Bowel sounds are normal. There is no distension.      Palpations: Abdomen is soft.      Tenderness: There is no abdominal tenderness.   Musculoskeletal:         General: Normal range of motion.   Neurological:      Mental Status: She is alert.   Psychiatric:         Mood and Affect: Affect " normal.        Lab Results   Component Value Date    GLUCOSE 118 (H) 01/13/2022    BUN 20 01/13/2022    CREATININE 0.60 01/13/2022    EGFRIFNONA 97 01/13/2022    BCR 33.3 (H) 01/13/2022    K 4.2 01/13/2022    CO2 25.9 01/13/2022    CALCIUM 9.7 01/13/2022    ALBUMIN 4.50 01/13/2022    LABIL2 1.3 (L) 03/01/2021    AST 14 01/13/2022    ALT 17 01/13/2022            Assessment and Plan    Diagnoses and all orders for this visit:    1. Primary insomnia (Primary)  Assessment & Plan:  Stable on current regimen.  Symptoms are well controlled.  No adverse effects. She does require ongoing use of this controlled substance to function.  Tox screen was due today.  Prior tox screen appropriate.  Kobi was run today.  Refills were not needed today.  RTC 3 months.        2. Other long term (current) drug therapy  -     POC Urine Drug Screen [facility]    3. Essential (primary) hypertension  Assessment & Plan:  Blood pressure at goal.  No refills needed.  Labs are reviewed and up-to-date.      4. Other constipation  Assessment & Plan:  Resolved.  Continue to use MiraLAX as needed.        Follow Up   Return in about 3 months (around 5/9/2022) for Medicare Wellness.  Patient was given instructions and counseling regarding her condition or for health maintenance advice. Please see specific information pulled into the AVS if appropriate.

## 2022-02-23 LAB — BENZODIAZ UR QL: NEGATIVE

## 2022-04-11 ENCOUNTER — CLINICAL SUPPORT (OUTPATIENT)
Dept: FAMILY MEDICINE CLINIC | Age: 77
End: 2022-04-11

## 2022-04-11 ENCOUNTER — TELEPHONE (OUTPATIENT)
Dept: FAMILY MEDICINE CLINIC | Age: 77
End: 2022-04-11

## 2022-04-11 VITALS — DIASTOLIC BLOOD PRESSURE: 76 MMHG | HEART RATE: 57 BPM | SYSTOLIC BLOOD PRESSURE: 150 MMHG

## 2022-04-11 DIAGNOSIS — Z79.899 OTHER LONG TERM (CURRENT) DRUG THERAPY: Primary | ICD-10-CM

## 2022-04-11 PROCEDURE — 80305 DRUG TEST PRSMV DIR OPT OBS: CPT | Performed by: FAMILY MEDICINE

## 2022-04-11 NOTE — TELEPHONE ENCOUNTER
----- Message from Maria E Melgar MD sent at 2/24/2022 10:23 AM EST -----  Please have Yanni come in for a surprise tox screen.  Please bring pills for a pill count.  Thanks, BZJENNIFER

## 2022-05-10 ENCOUNTER — OFFICE VISIT (OUTPATIENT)
Dept: FAMILY MEDICINE CLINIC | Age: 77
End: 2022-05-10

## 2022-05-10 VITALS
HEART RATE: 57 BPM | BODY MASS INDEX: 35.44 KG/M2 | TEMPERATURE: 98.2 F | DIASTOLIC BLOOD PRESSURE: 72 MMHG | HEIGHT: 63 IN | WEIGHT: 200 LBS | SYSTOLIC BLOOD PRESSURE: 137 MMHG

## 2022-05-10 DIAGNOSIS — Z00.00 ANNUAL PHYSICAL EXAM: Primary | ICD-10-CM

## 2022-05-10 DIAGNOSIS — I10 ESSENTIAL (PRIMARY) HYPERTENSION: ICD-10-CM

## 2022-05-10 DIAGNOSIS — F41.9 ANXIETY: ICD-10-CM

## 2022-05-10 DIAGNOSIS — Z79.899 OTHER LONG TERM (CURRENT) DRUG THERAPY: ICD-10-CM

## 2022-05-10 PROCEDURE — 99213 OFFICE O/P EST LOW 20 MIN: CPT | Performed by: FAMILY MEDICINE

## 2022-05-10 PROCEDURE — 1159F MED LIST DOCD IN RCRD: CPT | Performed by: FAMILY MEDICINE

## 2022-05-10 PROCEDURE — G0439 PPPS, SUBSEQ VISIT: HCPCS | Performed by: FAMILY MEDICINE

## 2022-05-10 PROCEDURE — 1170F FXNL STATUS ASSESSED: CPT | Performed by: FAMILY MEDICINE

## 2022-05-10 RX ORDER — ESCITALOPRAM OXALATE 10 MG/1
5 TABLET ORAL DAILY
Qty: 45 TABLET | Refills: 1 | Status: SHIPPED | OUTPATIENT
Start: 2022-05-10 | End: 2022-07-25

## 2022-05-10 RX ORDER — ATENOLOL 25 MG/1
TABLET ORAL
Qty: 135 TABLET | Refills: 1 | Status: SHIPPED | OUTPATIENT
Start: 2022-05-10 | End: 2022-11-16 | Stop reason: SDUPTHER

## 2022-05-10 NOTE — ASSESSMENT & PLAN NOTE
She is UTD on colonoscopy, last done 11/2017 and this was normal.  Ten year repeat recommended.  Pap smear is no longer indicated by age and history; s/p hysterectomy.  She is due for mammogram, last done 3/2021 and this was normal (Dr. Apryl Whyte manages this); she will contact Dr. Whyte.  She is UTD on DEXA, last done 7/2020 and this showed osteopenia.  She is due for COVID, Pneumovax, Prevnar, Shingrix, Td and flu; declines all vaccines today.  She is  UTD on routine labs including HTN panel.

## 2022-05-10 NOTE — PROGRESS NOTES
"The ABCs of the Annual Wellness Visit  Subsequent Medicare Wellness Visit    Chief Complaint   Patient presents with   • Medicare Wellness-subsequent      Subjective    History of Present Illness:  Yanni Huynh is a 76 y.o. female who presents for a Subsequent Medicare Wellness Visit.    She is UTD on colonoscopy, last done 11/2017 and this was normal.  Ten year repeat recommended.  Pap smear is no longer indicated by age and history; s/p hysterectomy.  She is due for mammogram, last done 3/2021 and this was normal (Dr. Apryl Whyte manages this).  She is UTD on DEXA, last done 7/2020 and this showed osteopenia.  She is due for COVID, Pneumovax, Prevnar, Shingrix, Td and flu.  She is  UTD on routine labs including HTN panel.    She is on Lexapro and lorazepam for anxiety.  Anxiety and panic attacks have been well controlled.   Her mood is \"pretty good.\"   She is on lorazepam and melatonin for insomnia.  She has been on the lorazepam for many years.  She saw Demetrius Romero for sleep therapy in 2015 who worked with her to significantly decrease the amount of medication she was using, and this was very helpful, but she was unable to stop the lorazepam altogether.  She has tried trazodone and Ambien previously but had intolerable side effects with both.      She is on atenolol for HTN.  Her  BP has been well controlled.  She does check it regularly at home.    No chest pain, palpitations, or shortness of breath.        The following portions of the patient's history were reviewed and   updated as appropriate: allergies, current medications, past family history, past medical history, past social history, past surgical history and problem list.    Compared to one year ago, the patient feels her physical   health is the same.    Compared to one year ago, the patient feels her mental   health is the same.    Recent Hospitalizations:  She was not admitted to the hospital during the last year.       Current Medical " Providers:  Patient Care Team:  Maria E Melgar MD as PCP - General (Family Medicine)    Outpatient Medications Prior to Visit   Medication Sig Dispense Refill   • Calcium Carb-Cholecalciferol (CALCIUM 600 + D PO) Take  by mouth.     • cyclobenzaprine (FLEXERIL) 5 MG tablet Take 5 mg by mouth 3 (Three) Times a Day As Needed.     • fluorometholone (FML) 0.1 % ophthalmic suspension Administer 1 drop to both eyes Daily.     • IBUPROFEN PO Take 200 mg by mouth As Needed.     • LORazepam (ATIVAN) 1 MG tablet TAKE 1 TABLET BY MOUTH ONCE DAILY AT BEDTIME AS NEEDED (Patient taking differently: Take 0.5 mg by mouth.) 30 tablet 2   • Melatonin 1 MG capsule Take 1.5 mg by mouth Every Night.     • Misc Natural Products (CORTISOL MANAGER PO) Take  by mouth Every Night.     • Multiple Vitamins-Minerals (PRESERVISION AREDS PO) Take 1 capsule by mouth Daily.     • pilocarpine (PILOCAR) 1 % ophthalmic solution      • polyethylene glycol (GlycoLax) 17 GM/SCOOP powder Take 17 g by mouth Daily As Needed (constipation). 527 g 1   • atenolol (TENORMIN) 25 MG tablet 1 tablet PO QAM and 1/4 tab PO  tablet 1   • escitalopram (LEXAPRO) 10 MG tablet Take 0.5 tablets by mouth Daily. 45 tablet 1     No facility-administered medications prior to visit.       No opioid medication identified on active medication list. I have reviewed chart for other potential  high risk medication/s and harmful drug interactions in the elderly.          Aspirin is not on active medication list.  Aspirin use is not indicated based on review of current medical condition/s. Risk of harm outweighs potential benefits.  .    Patient Active Problem List   Diagnosis   • Status post open reduction with internal fixation (ORIF) of fracture of ankle   • Shoulder joint dislocation   • Ulnar neuritis, left   • Shoulder joint dislocation, left, sequela   • Left elbow pain   • Primary osteoarthritis of right knee   • Basal cell carcinoma   • Fibrocystic breast  "changes   • Incisional hernia   • Mass of chest wall   • Mass of wrist, right   • Essential (primary) hypertension   • Other long term (current) drug therapy   • Anxiety   • History of falling   • Primary insomnia   • Corneal transplant status   • Bilateral nonexudative age-related macular degeneration   • Right sided abdominal pain   • Other constipation   • Annual physical exam     Advance Care Planning  Advance Directive is not on file.  ACP discussion was held with the patient during this visit. Patient does not have an advance directive, information provided.    Review of Systems   Constitutional: Negative for chills, fatigue and fever.   HENT: Negative for congestion, hearing loss and rhinorrhea.    Eyes: Negative for pain and visual disturbance.   Respiratory: Negative for cough and shortness of breath.    Cardiovascular: Negative for chest pain and palpitations.   Gastrointestinal: Negative for abdominal pain, constipation, diarrhea, nausea and vomiting.   Genitourinary: Negative for difficulty urinating and dysuria.   Musculoskeletal: Positive for arthralgias. Negative for myalgias.   Neurological: Negative for weakness and numbness.   Psychiatric/Behavioral: Negative for dysphoric mood and sleep disturbance. The patient is nervous/anxious.         Objective    Vitals:    05/10/22 1251   BP: 137/72   BP Location: Right arm   Patient Position: Sitting   Pulse: 57   Temp: 98.2 °F (36.8 °C)   TempSrc: Oral   Weight: 90.7 kg (200 lb)   Height: 160 cm (63\")     BMI Readings from Last 1 Encounters:   05/10/22 35.43 kg/m²   BMI is above normal parameters. Recommendations include: exercise counseling and nutrition counseling    Does the patient have evidence of cognitive impairment? No    Physical Exam  Vitals reviewed.   Constitutional:       General: She is not in acute distress.     Appearance: Normal appearance. She is well-developed.   HENT:      Head: Normocephalic and atraumatic.      Right Ear: External ear " normal.      Left Ear: External ear normal.      Mouth/Throat:      Mouth: Mucous membranes are moist.   Eyes:      Extraocular Movements: Extraocular movements intact.      Conjunctiva/sclera: Conjunctivae normal.      Pupils: Pupils are equal, round, and reactive to light.   Cardiovascular:      Rate and Rhythm: Normal rate and regular rhythm.      Heart sounds: No murmur heard.  Pulmonary:      Effort: Pulmonary effort is normal.      Breath sounds: Normal breath sounds. No wheezing, rhonchi or rales.   Abdominal:      General: Bowel sounds are normal. There is no distension.      Palpations: Abdomen is soft.      Tenderness: There is no abdominal tenderness.   Musculoskeletal:         General: Normal range of motion.   Skin:     General: Skin is warm and dry.   Neurological:      Mental Status: She is alert and oriented to person, place, and time.      Deep Tendon Reflexes: Reflexes normal.   Psychiatric:         Mood and Affect: Mood and affect normal.         Behavior: Behavior normal.         Thought Content: Thought content normal.         Judgment: Judgment normal.             Lab Results   Component Value Date    GLUCOSE 118 (H) 01/13/2022    BUN 20 01/13/2022    CREATININE 0.60 01/13/2022    EGFRIFNONA 97 01/13/2022    BCR 33.3 (H) 01/13/2022    K 4.2 01/13/2022    CO2 25.9 01/13/2022    CALCIUM 9.7 01/13/2022    ALBUMIN 4.50 01/13/2022    LABIL2 1.3 (L) 03/01/2021    AST 14 01/13/2022    ALT 17 01/13/2022     Lab Results   Component Value Date    CHLPL 185 03/01/2021    TRIG 106 03/01/2021    HDL 55 03/01/2021     (H) 03/01/2021         HEALTH RISK ASSESSMENT    Smoking Status:  Social History     Tobacco Use   Smoking Status Never Smoker   Smokeless Tobacco Never Used     Alcohol Consumption:  Social History     Substance and Sexual Activity   Alcohol Use No     Fall Risk Screen:    STEADI Fall Risk Assessment was completed, and patient is at LOW risk for falls.Assessment completed  on:1/13/2022    Depression Screening:  PHQ-2/PHQ-9 Depression Screening 5/10/2022   Retired PHQ-9 Total Score -   Retired Total Score -   Little Interest or Pleasure in Doing Things 0-->not at all   Feeling Down, Depressed or Hopeless 0-->not at all   PHQ-9: Brief Depression Severity Measure Score 0       Health Habits and Functional and Cognitive Screening:  Functional & Cognitive Status 5/10/2022   Do you have difficulty preparing food and eating? No   Do you have difficulty bathing yourself, getting dressed or grooming yourself? No   Do you have difficulty using the toilet? No   Do you have difficulty moving around from place to place? No   Do you have trouble with steps or getting out of a bed or a chair? No   Current Diet Well Balanced Diet   Dental Exam Up to date   Eye Exam Up to date   Exercise (times per week) 0 times per week   Current Exercises Include No Regular Exercise   Do you need help using the phone?  No   Are you deaf or do you have serious difficulty hearing?  No   Do you need help with transportation? No   Do you need help shopping? No   Do you need help preparing meals?  No   Do you need help with housework?  No   Do you need help with laundry? No   Do you need help taking your medications? No   Do you need help managing money? No   Do you ever drive or ride in a car without wearing a seat belt? No   Have you felt unusual stress, anger or loneliness in the last month? No   Who do you live with? Spouse   If you need help, do you have trouble finding someone available to you? No   Have you been bothered in the last four weeks by sexual problems? No   Do you have difficulty concentrating, remembering or making decisions? No       Age-appropriate Screening Schedule:  Refer to the list below for future screening recommendations based on patient's age, sex and/or medical conditions. Orders for these recommended tests are listed in the plan section. The patient has been provided with a written  plan.    Health Maintenance   Topic Date Due   • ZOSTER VACCINE (1 of 2) 05/10/2022 (Originally 12/4/1995)   • TDAP/TD VACCINES (1 - Tdap) 01/13/2023 (Originally 12/4/1964)   • DXA SCAN  07/15/2022   • INFLUENZA VACCINE  08/01/2022   • MAMMOGRAM  03/25/2023              [unfilled]  CMS Preventative Services Quick Reference  Risk Factors Identified During Encounter  Immunizations Discussed/Encouraged (specific Immunizations; Shingrix and COVID19  The above risks/problems have been discussed with the patient.  Follow up actions/plans if indicated are seen below in the Assessment/Plan Section.  Pertinent information has been shared with the patient in the After Visit Summary.    Diagnoses and all orders for this visit:    1. Annual physical exam (Primary)  Assessment & Plan:  She is UTD on colonoscopy, last done 11/2017 and this was normal.  Ten year repeat recommended.  Pap smear is no longer indicated by age and history; s/p hysterectomy.  She is due for mammogram, last done 3/2021 and this was normal (Dr. Apryl Whyte manages this); she will contact Dr. Whyte.  She is UTD on DEXA, last done 7/2020 and this showed osteopenia.  She is due for COVID, Pneumovax, Prevnar, Shingrix, Td and flu; declines all vaccines today.  She is  UTD on routine labs including HTN panel.      2. Anxiety  Assessment & Plan:  Stable on current regimen.  Symptoms are well controlled.  No adverse effects. She does require ongoing use of this controlled substance to function.  Tox screen was not due today.  Prior tox screen appropriate.  Kobi was run today.  Refills were not needed today.  RTC 3 months.      Orders:  -     escitalopram (LEXAPRO) 10 MG tablet; Take 0.5 tablets by mouth Daily.  Dispense: 45 tablet; Refill: 1    3. Other long term (current) drug therapy  -     Cancel: POC Urine Drug Screen [facility]    4. Essential (primary) hypertension  Assessment & Plan:  Stable on atenolol 25 mg daily.  No refills  needed.  Labs reviewed and up-to-date.    Orders:  -     atenolol (TENORMIN) 25 MG tablet; 1 tablet PO QAM and 1/4 tab PO QHS  Dispense: 135 tablet; Refill: 1      Follow Up:   Return in about 3 months (around 8/10/2022) for Recheck.     An After Visit Summary and PPPS were made available to the patient.

## 2022-05-10 NOTE — ASSESSMENT & PLAN NOTE
Stable on current regimen.  Symptoms are well controlled.  No adverse effects. She does require ongoing use of this controlled substance to function.  Tox screen was not due today.  Prior tox screen appropriate.  Kobi was run today.  Refills were not needed today.  RTC 3 months.

## 2022-06-16 DIAGNOSIS — F41.9 ANXIETY: ICD-10-CM

## 2022-06-16 RX ORDER — LORAZEPAM 1 MG/1
.5-1 TABLET ORAL NIGHTLY PRN
Qty: 30 TABLET | Refills: 0 | Status: SHIPPED | OUTPATIENT
Start: 2022-06-16 | End: 2022-09-02

## 2022-07-25 DIAGNOSIS — F41.9 ANXIETY: ICD-10-CM

## 2022-07-25 RX ORDER — ESCITALOPRAM OXALATE 10 MG/1
TABLET ORAL
Qty: 45 TABLET | Refills: 0 | Status: SHIPPED | OUTPATIENT
Start: 2022-07-25 | End: 2022-08-10 | Stop reason: SDUPTHER

## 2022-08-10 ENCOUNTER — OFFICE VISIT (OUTPATIENT)
Dept: FAMILY MEDICINE CLINIC | Age: 77
End: 2022-08-10

## 2022-08-10 VITALS
SYSTOLIC BLOOD PRESSURE: 151 MMHG | DIASTOLIC BLOOD PRESSURE: 83 MMHG | HEIGHT: 63 IN | HEART RATE: 51 BPM | BODY MASS INDEX: 36.43 KG/M2 | WEIGHT: 205.6 LBS

## 2022-08-10 DIAGNOSIS — Z51.81 MEDICATION MONITORING ENCOUNTER: ICD-10-CM

## 2022-08-10 DIAGNOSIS — F41.9 ANXIETY: Primary | ICD-10-CM

## 2022-08-10 DIAGNOSIS — N39.3 STRESS INCONTINENCE: ICD-10-CM

## 2022-08-10 DIAGNOSIS — I10 ESSENTIAL (PRIMARY) HYPERTENSION: ICD-10-CM

## 2022-08-10 DIAGNOSIS — Z79.899 OTHER LONG TERM (CURRENT) DRUG THERAPY: ICD-10-CM

## 2022-08-10 PROBLEM — Z00.00 ANNUAL PHYSICAL EXAM: Status: RESOLVED | Noted: 2022-05-10 | Resolved: 2022-08-10

## 2022-08-10 PROCEDURE — 99214 OFFICE O/P EST MOD 30 MIN: CPT | Performed by: FAMILY MEDICINE

## 2022-08-10 PROCEDURE — G0480 DRUG TEST DEF 1-7 CLASSES: HCPCS | Performed by: FAMILY MEDICINE

## 2022-08-10 PROCEDURE — 80305 DRUG TEST PRSMV DIR OPT OBS: CPT | Performed by: FAMILY MEDICINE

## 2022-08-10 RX ORDER — ESCITALOPRAM OXALATE 10 MG/1
5 TABLET ORAL DAILY
Qty: 45 TABLET | Refills: 1 | Status: SHIPPED | OUTPATIENT
Start: 2022-08-10

## 2022-08-10 NOTE — ASSESSMENT & PLAN NOTE
Discuss stress incontinence today.  It is mildly bothersome to her but not to the point where she would like to go for pelvic floor rehab or consult with a urogynecologist.  We will continue to watch it for now.  She can always call me back if she changes her mind.  Unfortunately, medications are unlikely to be useful for the problems that she is experiencing.

## 2022-08-10 NOTE — PROGRESS NOTES
"Chief Complaint  Anxiety (3 month follow up)    Subjective     {Problem List  Visit Diagnosis   Encounters  Notes  Medications  Labs  Result Review Imaging  Media :23}     Yanni Huynh presents to [facility] today for follow-up of chronic issues.    She is on escitalopram and lorazepam for anxiety.  Anxiety and panic attacks have been well controlled.   Her mood is \"good.\"   She is on lorazepam and melatonin for insomnia.  She has been on the lorazepam for many years.  She saw Dr. Demetrius Romero for sleep therapy in 2015 and he worked with her to significantly decrease the amount of medication she was using.  She was unable to stop the lorazepam altogether.  She has tried trazodone and Ambien previously but had intolerable side effects with both.      She is on atenolol for hypertension.  Her blood pressure has been well controlled.  She does check it regularly at home.    She denies chest pain, palpitations, or shortness of breath.     She has noticed urinary incontinence.  She will use the bathroom, but 15 min later, she has to go again.  She does not have a sense of urgency so much, but she just notices it dribbling.  Worst with coughing, sneezing, laughing or picking up something heavy.  It is not too bothersome to her right now, but she wanted to mention on record.      Current Outpatient Medications:   •  atenolol (TENORMIN) 25 MG tablet, 1 tablet PO QAM and 1/4 tab PO QHS, Disp: 135 tablet, Rfl: 1  •  Calcium Carb-Cholecalciferol (CALCIUM 600 + D PO), Take  by mouth., Disp: , Rfl:   •  cyclobenzaprine (FLEXERIL) 5 MG tablet, Take 5 mg by mouth 3 (Three) Times a Day As Needed., Disp: , Rfl:   •  escitalopram (LEXAPRO) 10 MG tablet, Take 0.5 tablets by mouth Daily., Disp: 45 tablet, Rfl: 1  •  fluorometholone (FML) 0.1 % ophthalmic suspension, Administer 1 drop to both eyes Daily., Disp: , Rfl:   •  IBUPROFEN PO, Take 200 mg by mouth As Needed., Disp: , Rfl:   •  " "LORazepam (ATIVAN) 1 MG tablet, Take 0.5-1 tablets by mouth At Night As Needed for Sleep., Disp: 30 tablet, Rfl: 0  •  Melatonin 1 MG capsule, Take 1.5 mg by mouth Every Night., Disp: , Rfl:   •  Misc Natural Products (CORTISOL MANAGER PO), Take  by mouth Every Night., Disp: , Rfl:   •  Multiple Vitamins-Minerals (PRESERVISION AREDS PO), Take 1 capsule by mouth Daily., Disp: , Rfl:   •  pilocarpine (PILOCAR) 1 % ophthalmic solution, , Disp: , Rfl:     Allergies:  Patient has no known allergies.      Objective   Vital Signs:   Vitals:    08/10/22 1005 08/10/22 1027   BP: 151/86 151/83   BP Location: Left arm Left arm   Patient Position: Sitting Sitting   Pulse: 50 51   Weight: 93.3 kg (205 lb 9.6 oz)    Height: 160 cm (62.99\")        Physical Exam  Vitals reviewed.   Constitutional:       General: She is not in acute distress.     Appearance: Normal appearance. She is well-developed.   HENT:      Head: Normocephalic and atraumatic.      Right Ear: External ear normal.      Left Ear: External ear normal.   Eyes:      Extraocular Movements: Extraocular movements intact.      Conjunctiva/sclera: Conjunctivae normal.      Pupils: Pupils are equal, round, and reactive to light.   Cardiovascular:      Rate and Rhythm: Normal rate and regular rhythm.      Heart sounds: No murmur heard.  Pulmonary:      Effort: Pulmonary effort is normal.      Breath sounds: Normal breath sounds. No wheezing, rhonchi or rales.   Abdominal:      General: Bowel sounds are normal. There is no distension.      Palpations: Abdomen is soft.      Tenderness: There is no abdominal tenderness.   Musculoskeletal:         General: Normal range of motion.   Neurological:      Mental Status: She is alert.   Psychiatric:         Mood and Affect: Affect normal.          Lab Results   Component Value Date    GLUCOSE 118 (H) 01/13/2022    BUN 20 01/13/2022    CREATININE 0.60 01/13/2022    EGFRIFNONA 97 01/13/2022    BCR 33.3 (H) 01/13/2022    K 4.2 " 01/13/2022    CO2 25.9 01/13/2022    CALCIUM 9.7 01/13/2022    ALBUMIN 4.50 01/13/2022    LABIL2 1.3 (L) 03/01/2021    AST 14 01/13/2022    ALT 17 01/13/2022       Lab Results   Component Value Date    CHLPL 185 03/01/2021    TRIG 106 03/01/2021    HDL 55 03/01/2021     (H) 03/01/2021            Assessment and Plan    Diagnoses and all orders for this visit:    1. Anxiety (Primary)  Assessment & Plan:  Stable on current regimen.  Symptoms are well controlled.  No adverse effects. She does require ongoing use of this controlled substance to function.  Tox screen was due today.  Prior tox screen appropriate.  OSWALDO was run today.  Refills were not needed today.  RTC 3 months.      Orders:  -     escitalopram (LEXAPRO) 10 MG tablet; Take 0.5 tablets by mouth Daily.  Dispense: 45 tablet; Refill: 1    2. Other long term (current) drug therapy  -     POC Urine Drug Screen [facility]    3. Stress incontinence  Assessment & Plan:  Discuss stress incontinence today.  It is mildly bothersome to her but not to the point where she would like to go for pelvic floor rehab or consult with a urogynecologist.  We will continue to watch it for now.  She can always call me back if she changes her mind.  Unfortunately, medications are unlikely to be useful for the problems that she is experiencing.      4. Essential (primary) hypertension  Assessment & Plan:  Currently on atenolol 25 mg daily.  No refills needed.  Blood pressure was initially elevated today but usually runs at goal at home.  Continue monitoring blood pressure at home and we may make some adjustments accordingly.  By her age, I would like to see her running consistently under 150/90.  Labs reviewed and up-to-date.        Follow Up   Return in about 3 months (around 11/10/2022) for Recheck.  Patient was given instructions and counseling regarding her condition or for health maintenance advice. Please see specific information pulled into the AVS if  appropriate.

## 2022-08-10 NOTE — ASSESSMENT & PLAN NOTE
Currently on atenolol 25 mg daily.  No refills needed.  Blood pressure was initially elevated today but usually runs at goal at home.  Continue monitoring blood pressure at home and we may make some adjustments accordingly.  By her age, I would like to see her running consistently under 150/90.  Labs reviewed and up-to-date.

## 2022-08-10 NOTE — ASSESSMENT & PLAN NOTE
Stable on current regimen.  Symptoms are well controlled.  No adverse effects. She does require ongoing use of this controlled substance to function.  Tox screen was due today.  Prior tox screen appropriate.  OSWALDO was run today.  Refills were not needed today.  RTC 3 months.

## 2022-08-17 LAB — BENZODIAZ UR QL: NEGATIVE

## 2022-09-01 DIAGNOSIS — F41.9 ANXIETY: ICD-10-CM

## 2022-09-02 RX ORDER — LORAZEPAM 1 MG/1
TABLET ORAL
Qty: 30 TABLET | Refills: 1 | Status: SHIPPED | OUTPATIENT
Start: 2022-09-02 | End: 2023-01-19

## 2022-10-07 ENCOUNTER — HOSPITAL ENCOUNTER (OUTPATIENT)
Dept: GENERAL RADIOLOGY | Facility: HOSPITAL | Age: 77
Discharge: HOME | End: 2022-10-07
Admitting: NURSE PRACTITIONER

## 2022-10-07 ENCOUNTER — OFFICE VISIT (OUTPATIENT)
Dept: FAMILY MEDICINE CLINIC | Age: 77
End: 2022-10-07

## 2022-10-07 VITALS
SYSTOLIC BLOOD PRESSURE: 151 MMHG | WEIGHT: 208.2 LBS | HEART RATE: 59 BPM | TEMPERATURE: 98.8 F | HEIGHT: 63 IN | OXYGEN SATURATION: 98 % | DIASTOLIC BLOOD PRESSURE: 63 MMHG | BODY MASS INDEX: 36.89 KG/M2

## 2022-10-07 DIAGNOSIS — M79.602 LEFT ARM PAIN: ICD-10-CM

## 2022-10-07 DIAGNOSIS — M25.512 ACUTE PAIN OF LEFT SHOULDER: ICD-10-CM

## 2022-10-07 DIAGNOSIS — M25.512 ACUTE PAIN OF LEFT SHOULDER: Primary | ICD-10-CM

## 2022-10-07 PROCEDURE — 99213 OFFICE O/P EST LOW 20 MIN: CPT | Performed by: NURSE PRACTITIONER

## 2022-10-07 PROCEDURE — 73030 X-RAY EXAM OF SHOULDER: CPT

## 2022-10-07 PROCEDURE — 73060 X-RAY EXAM OF HUMERUS: CPT

## 2022-10-07 RX ORDER — DICLOFENAC SODIUM 75 MG/1
75 TABLET, DELAYED RELEASE ORAL 2 TIMES DAILY PRN
Qty: 60 TABLET | Refills: 0 | Status: SHIPPED | OUTPATIENT
Start: 2022-10-07 | End: 2023-02-28

## 2022-10-07 NOTE — PROGRESS NOTES
"Chief Complaint  Shoulder Pain    Subjective          Yanni Huynh presents to [facility]  History of Present Illness  She walked into a window yesterday and her left shoulder has been hurting ever since.  Arm Pain   The incident occurred 12 to 24 hours ago. The injury mechanism was a direct blow. The pain is present in the left shoulder. Quality: sharp, weak. The pain radiates to the left arm. The pain is at a severity of 8/10. The pain is severe. The pain has been fluctuating since the incident. Associated symptoms include muscle weakness. Pertinent negatives include no numbness or tingling. Associated symptoms comments: Admits: limited ROM. The symptoms are aggravated by movement. She has tried NSAIDs (Voltaren) for the symptoms. The treatment provided mild relief.       Objective   Vital Signs:   /63 (BP Location: Right arm, Patient Position: Sitting, Cuff Size: Adult)   Pulse 59   Temp 98.8 °F (37.1 °C) (Oral)   Ht 160 cm (62.99\")   Wt 94.4 kg (208 lb 3.2 oz)   SpO2 98%   BMI 36.89 kg/m²     Physical Exam  Constitutional:       General: She is not in acute distress.     Appearance: Normal appearance. She is obese.   HENT:      Head: Normocephalic.   Eyes:      Pupils: Pupils are equal, round, and reactive to light.      Visual Fields: Right eye visual fields normal and left eye visual fields normal.   Neck:      Trachea: Trachea normal.   Cardiovascular:      Rate and Rhythm: Normal rate and regular rhythm.      Heart sounds: Normal heart sounds.   Pulmonary:      Effort: Pulmonary effort is normal.      Breath sounds: Normal breath sounds and air entry.   Musculoskeletal:      Left shoulder: No swelling, deformity or tenderness. Decreased range of motion.      Left upper arm: No swelling or tenderness.      Right lower leg: No edema.      Left lower leg: No edema.   Skin:     General: Skin is warm and dry.   Neurological:      Mental Status: She is alert and " oriented to person, place, and time.   Psychiatric:         Mood and Affect: Mood and affect normal.         Behavior: Behavior normal.         Thought Content: Thought content normal.        Result Review :   The following data was reviewed by: DIEGO Valdez on 10/07/2022:  Comprehensive Metabolic Panel (01/13/2022 12:02)                    Assessment and Plan    Diagnoses and all orders for this visit:    1. Acute pain of left shoulder (Primary)  -     XR Shoulder 2+ View Left; Future  -     diclofenac (VOLTAREN) 75 MG EC tablet; Take 1 tablet by mouth 2 (Two) Times a Day As Needed (shoulder pain).  Dispense: 60 tablet; Refill: 0    2. Left arm pain  -     XR Humerus Left; Future      We will get an x-ray of her shoulder, and she would like also an x-ray of her left upper arm.  We will send in diclofenac.  We have discussed taking it twice a day for 2 weeks and then as needed after that.  We have discussed avoiding NSAID use while taking diclofenac.  May have to refer her back to [facility] for physical therapy.  May need a referral to Ortho, as she has had previous issues with her left shoulder.    Follow Up   Return if symptoms worsen or fail to improve.  Patient was given instructions and counseling regarding her condition or for health maintenance advice. Please see specific information pulled into the AVS if appropriate.

## 2022-11-16 ENCOUNTER — OFFICE VISIT (OUTPATIENT)
Dept: FAMILY MEDICINE CLINIC | Age: 77
End: 2022-11-16

## 2022-11-16 VITALS
DIASTOLIC BLOOD PRESSURE: 83 MMHG | HEIGHT: 63 IN | WEIGHT: 210.2 LBS | BODY MASS INDEX: 37.25 KG/M2 | SYSTOLIC BLOOD PRESSURE: 152 MMHG | HEART RATE: 52 BPM

## 2022-11-16 DIAGNOSIS — G89.29 CHRONIC LEFT SHOULDER PAIN: ICD-10-CM

## 2022-11-16 DIAGNOSIS — F41.9 ANXIETY: ICD-10-CM

## 2022-11-16 DIAGNOSIS — Z79.899 OTHER LONG TERM (CURRENT) DRUG THERAPY: ICD-10-CM

## 2022-11-16 DIAGNOSIS — I10 ESSENTIAL (PRIMARY) HYPERTENSION: ICD-10-CM

## 2022-11-16 DIAGNOSIS — F51.01 PRIMARY INSOMNIA: Primary | ICD-10-CM

## 2022-11-16 DIAGNOSIS — Z23 ENCOUNTER FOR IMMUNIZATION: ICD-10-CM

## 2022-11-16 DIAGNOSIS — M25.512 CHRONIC LEFT SHOULDER PAIN: ICD-10-CM

## 2022-11-16 PROBLEM — R22.31 MASS OF WRIST, RIGHT: Status: RESOLVED | Noted: 2021-01-07 | Resolved: 2022-11-16

## 2022-11-16 PROBLEM — R10.9 RIGHT SIDED ABDOMINAL PAIN: Status: RESOLVED | Noted: 2022-01-13 | Resolved: 2022-11-16

## 2022-11-16 PROBLEM — S43.006A SHOULDER JOINT DISLOCATION: Status: RESOLVED | Noted: 2017-05-23 | Resolved: 2022-11-16

## 2022-11-16 PROBLEM — M25.522 LEFT ELBOW PAIN: Status: RESOLVED | Noted: 2018-02-24 | Resolved: 2022-11-16

## 2022-11-16 PROBLEM — S43.005S: Status: RESOLVED | Noted: 2018-02-24 | Resolved: 2022-11-16

## 2022-11-16 PROCEDURE — 80305 DRUG TEST PRSMV DIR OPT OBS: CPT | Performed by: FAMILY MEDICINE

## 2022-11-16 PROCEDURE — 99214 OFFICE O/P EST MOD 30 MIN: CPT | Performed by: FAMILY MEDICINE

## 2022-11-16 RX ORDER — AMLODIPINE BESYLATE 2.5 MG/1
2.5 TABLET ORAL DAILY
Qty: 30 TABLET | Refills: 5 | Status: SHIPPED | OUTPATIENT
Start: 2022-11-16

## 2022-11-16 RX ORDER — CYCLOBENZAPRINE HCL 5 MG
5 TABLET ORAL NIGHTLY PRN
Qty: 30 TABLET | Refills: 0 | Status: SHIPPED | OUTPATIENT
Start: 2022-11-16

## 2022-11-16 RX ORDER — ATENOLOL 25 MG/1
TABLET ORAL
Qty: 135 TABLET | Refills: 1 | Status: SHIPPED | OUTPATIENT
Start: 2022-11-16

## 2022-11-16 NOTE — PROGRESS NOTES
"Chief Complaint  Anxiety (3 month follow up)    Subjective          Yanni Huynh presents to [facility] today for f/u of routine issues.    She is due for Prevnar 20 and flu shot.    She has been having a lot of pain in the R and L shoulders.  She ran into a glass door pretty hard.  For several days afterwards, she was unable to raise her arm up over her head.  She already had some rotator cuff damage from a remote fall in the L shoulder.  She has seen Dr. Dela Cruz for that back then.  He told her that she would need a shoulder replacement eventually.  She has been using diclofenac given to her by Mona Rojas last month and that has helped.  She has an old rx of Flexeril but has not tried that because it was .    She is on Lexapro and lorazepam for anxiety.  Symptoms of anxiety and panic attacks have been well controlled.   Her mood is \"fine.\"   She is on lorazepam and melatonin for insomnia.  She has been on the lorazepam for many years.  She saw Dr. Demetrius Romero for sleep therapy in  and he worked with her to significantly decrease the amount of medication she was using.  She was unable to stop the lorazepam altogether, however.  She has tried trazodone and Ambien previously but had intolerable side effects with both.      She is on atenolol for HTN.  BP has been running high the last few times that she has been in clinic.  She does not check it regularly at home.  No chest pain, palpitations, or shortness of breath.     She has stress urinary incontinence with frequency.  No urgency so much.  She notices dribbling worst with coughing, sneezing, laughing or picking up something heavy.  It is not too bothersome to her for now.      Current Outpatient Medications:   •  atenolol (TENORMIN) 25 MG tablet, 1 tablet PO QAM and 1/4 tab PO QHS, Disp: 135 tablet, Rfl: 1  •  Calcium Carb-Cholecalciferol (CALCIUM 600 + D PO), Take  by mouth Daily., Disp: , Rfl:   •  cyclobenzaprine " "(FLEXERIL) 5 MG tablet, Take 1 tablet by mouth At Night As Needed for Muscle Spasms., Disp: 30 tablet, Rfl: 0  •  diclofenac (VOLTAREN) 75 MG EC tablet, Take 1 tablet by mouth 2 (Two) Times a Day As Needed (shoulder pain)., Disp: 60 tablet, Rfl: 0  •  escitalopram (LEXAPRO) 10 MG tablet, Take 0.5 tablets by mouth Daily., Disp: 45 tablet, Rfl: 1  •  fluorometholone (FML) 0.1 % ophthalmic suspension, Administer 1 drop to both eyes Daily., Disp: , Rfl:   •  LORazepam (ATIVAN) 1 MG tablet, TAKE 1/2 TO 1 (ONE-HALF TO ONE) TABLET BY MOUTH AT NIGHT AS NEEDED FOR SLEEP, Disp: 30 tablet, Rfl: 1  •  Melatonin 1 MG capsule, Take 1.5 mg by mouth Every Night., Disp: , Rfl:   •  Misc Natural Products (CORTISOL MANAGER PO), Take  by mouth Every Night., Disp: , Rfl:   •  Multiple Vitamins-Minerals (PRESERVISION AREDS PO), Take 1 capsule by mouth Daily., Disp: , Rfl:   •  pilocarpine (PILOCAR) 1 % ophthalmic solution, , Disp: , Rfl:   •  amLODIPine (NORVASC) 2.5 MG tablet, Take 1 tablet by mouth Daily., Disp: 30 tablet, Rfl: 5    Allergies:  Patient has no known allergies.      Objective   Vital Signs:   Vitals:    11/16/22 0958 11/16/22 1031   BP: 168/84 152/83   BP Location: Left arm Left arm   Patient Position: Sitting Sitting   Pulse: 58 52   Weight: 95.3 kg (210 lb 3.2 oz)    Height: 160 cm (62.99\")        Physical Exam  Vitals reviewed.   Constitutional:       General: She is not in acute distress.     Appearance: Normal appearance. She is well-developed.   HENT:      Head: Normocephalic and atraumatic.      Right Ear: External ear normal.      Left Ear: External ear normal.   Eyes:      Extraocular Movements: Extraocular movements intact.      Conjunctiva/sclera: Conjunctivae normal.      Pupils: Pupils are equal, round, and reactive to light.   Cardiovascular:      Rate and Rhythm: Normal rate and regular rhythm.      Heart sounds: No murmur heard.  Pulmonary:      Effort: Pulmonary effort is normal.      Breath sounds: " Normal breath sounds. No wheezing, rhonchi or rales.   Abdominal:      General: Bowel sounds are normal. There is no distension.      Palpations: Abdomen is soft.      Tenderness: There is no abdominal tenderness.   Musculoskeletal:         General: Tenderness (diffuse bilateral shoulder TTP, worse posteriorly; +TTP bilateral cervical paraspinals) present. Normal range of motion.   Neurological:      Mental Status: She is alert.   Psychiatric:         Mood and Affect: Affect normal.          Lab Results   Component Value Date    GLUCOSE 118 (H) 01/13/2022    BUN 20 01/13/2022    CREATININE 0.60 01/13/2022    EGFRIFNONA 97 01/13/2022    BCR 33.3 (H) 01/13/2022    K 4.2 01/13/2022    CO2 25.9 01/13/2022    CALCIUM 9.7 01/13/2022    ALBUMIN 4.50 01/13/2022    LABIL2 1.3 (L) 03/01/2021    AST 14 01/13/2022    ALT 17 01/13/2022       Lab Results   Component Value Date    CHLPL 185 03/01/2021    TRIG 106 03/01/2021    HDL 55 03/01/2021     (H) 03/01/2021            Assessment and Plan    Diagnoses and all orders for this visit:    1. Primary insomnia (Primary)  Assessment & Plan:  Stable on current regimen.  Symptoms are well controlled.  No adverse effects. She does require ongoing use of this controlled substance to function.  Tox screen was due today.  Prior tox screen appropriate.  OSWALDO was run today.  Refills were not needed today.  RTC 3 months.        2. Anxiety  Assessment & Plan:  Stable on current regimen.  Symptoms are well controlled.  No adverse effects. She does require ongoing use of this controlled substance to function.  Tox screen was due today.  Prior tox screen appropriate.  OSWALDO was run today.  Refills were not needed today.  RTC 3 months.        3. Other long term (current) drug therapy  -     POC Urine Drug Screen [facility]    4. Chronic left shoulder pain  Assessment & Plan:  Continue use of diclofenac as prescribed by Mona Rojas.  I have sent in a new prescription for her  cyclobenzaprine since she has not been using that due to her current meds being .  She can also try using a heating pad and/or ice pack to the shoulder.  If symptoms do not resolve, she should let me know and we will plan to get her in with Dr. Dela Cruz for further evaluation and treatment.    Orders:  -     cyclobenzaprine (FLEXERIL) 5 MG tablet; Take 1 tablet by mouth At Night As Needed for Muscle Spasms.  Dispense: 30 tablet; Refill: 0    5. Essential (primary) hypertension  Assessment & Plan:  Blood pressure continues to run high.  We are going to add in a low-dose of amlodipine 2.5 mg daily since she has been very sensitive reportedly to antihypertensives in the past.  She was on for little pain previously and did well with that so we will try to close relation and amlodipine.  Continue atenolol 25 mg every morning and one quarter tab at night.  We may discontinue the evening dose going forward.  Her pulse is fairly bradycardic but she is asymptomatic with it.  Continue to monitor closely.    Orders:  -     atenolol (TENORMIN) 25 MG tablet; 1 tablet PO QAM and 1/4 tab PO QHS  Dispense: 135 tablet; Refill: 1  -     amLODIPine (NORVASC) 2.5 MG tablet; Take 1 tablet by mouth Daily.  Dispense: 30 tablet; Refill: 5    6. Encounter for immunization  Comments:  Declines flu.  She will discuss Prevnar-20 with Dr. Gallardo.      Follow Up   Return in about 3 months (around 2023) for Recheck.  Patient was given instructions and counseling regarding her condition or for health maintenance advice. Please see specific information pulled into the AVS if appropriate.

## 2022-11-16 NOTE — ASSESSMENT & PLAN NOTE
Blood pressure continues to run high.  We are going to add in a low-dose of amlodipine 2.5 mg daily since she has been very sensitive reportedly to antihypertensives in the past.  She was on for little pain previously and did well with that so we will try to close relation and amlodipine.  Continue atenolol 25 mg every morning and one quarter tab at night.  We may discontinue the evening dose going forward.  Her pulse is fairly bradycardic but she is asymptomatic with it.  Continue to monitor closely.

## 2022-11-16 NOTE — ASSESSMENT & PLAN NOTE
Continue use of diclofenac as prescribed by Mona Rojas.  I have sent in a new prescription for her cyclobenzaprine since she has not been using that due to her current meds being .  She can also try using a heating pad and/or ice pack to the shoulder.  If symptoms do not resolve, she should let me know and we will plan to get her in with Dr. Dela Cruz for further evaluation and treatment.

## 2023-01-19 DIAGNOSIS — F41.9 ANXIETY: ICD-10-CM

## 2023-01-19 RX ORDER — LORAZEPAM 1 MG/1
TABLET ORAL
Qty: 30 TABLET | Refills: 0 | Status: SHIPPED | OUTPATIENT
Start: 2023-01-19

## 2023-02-07 ENCOUNTER — TELEPHONE (OUTPATIENT)
Dept: FAMILY MEDICINE CLINIC | Age: 78
End: 2023-02-07
Payer: MEDICARE

## 2023-02-28 ENCOUNTER — OFFICE VISIT (OUTPATIENT)
Dept: FAMILY MEDICINE CLINIC | Age: 78
End: 2023-02-28
Payer: MEDICARE

## 2023-02-28 ENCOUNTER — LAB (OUTPATIENT)
Dept: LAB | Facility: HOSPITAL | Age: 78
End: 2023-02-28
Payer: MEDICARE

## 2023-02-28 VITALS
HEIGHT: 63 IN | WEIGHT: 209 LBS | HEART RATE: 54 BPM | BODY MASS INDEX: 37.03 KG/M2 | TEMPERATURE: 98.7 F | DIASTOLIC BLOOD PRESSURE: 83 MMHG | SYSTOLIC BLOOD PRESSURE: 151 MMHG

## 2023-02-28 DIAGNOSIS — I10 ESSENTIAL (PRIMARY) HYPERTENSION: ICD-10-CM

## 2023-02-28 DIAGNOSIS — M25.512 CHRONIC LEFT SHOULDER PAIN: ICD-10-CM

## 2023-02-28 DIAGNOSIS — F51.01 PRIMARY INSOMNIA: ICD-10-CM

## 2023-02-28 DIAGNOSIS — Z11.59 ENCOUNTER FOR SCREENING FOR OTHER VIRAL DISEASES: ICD-10-CM

## 2023-02-28 DIAGNOSIS — Z79.899 OTHER LONG TERM (CURRENT) DRUG THERAPY: ICD-10-CM

## 2023-02-28 DIAGNOSIS — F41.8 OTHER SPECIFIED ANXIETY DISORDERS: Primary | ICD-10-CM

## 2023-02-28 DIAGNOSIS — G89.29 CHRONIC LEFT SHOULDER PAIN: ICD-10-CM

## 2023-02-28 LAB
AMPHET+METHAMPHET UR QL: NEGATIVE
AMPHETAMINES UR QL: NEGATIVE
BARBITURATES UR QL SCN: NEGATIVE
BASOPHILS # BLD AUTO: 0.05 10*3/MM3 (ref 0–0.2)
BASOPHILS NFR BLD AUTO: 0.6 % (ref 0–1.5)
BENZODIAZ UR QL SCN: NEGATIVE
BUPRENORPHINE SERPL-MCNC: NEGATIVE NG/ML
CANNABINOIDS SERPL QL: NEGATIVE
COCAINE UR QL: NEGATIVE
DEPRECATED RDW RBC AUTO: 39.7 FL (ref 37–54)
EOSINOPHIL # BLD AUTO: 0.33 10*3/MM3 (ref 0–0.4)
EOSINOPHIL NFR BLD AUTO: 3.9 % (ref 0.3–6.2)
ERYTHROCYTE [DISTWIDTH] IN BLOOD BY AUTOMATED COUNT: 13 % (ref 12.3–15.4)
EXPIRATION DATE: NORMAL
HCT VFR BLD AUTO: 39.9 % (ref 34–46.6)
HCV AB SER DONR QL: NORMAL
HGB BLD-MCNC: 13.7 G/DL (ref 12–15.9)
IMM GRANULOCYTES # BLD AUTO: 0.03 10*3/MM3 (ref 0–0.05)
IMM GRANULOCYTES NFR BLD AUTO: 0.4 % (ref 0–0.5)
LYMPHOCYTES # BLD AUTO: 2.56 10*3/MM3 (ref 0.7–3.1)
LYMPHOCYTES NFR BLD AUTO: 30.6 % (ref 19.6–45.3)
Lab: NORMAL
MCH RBC QN AUTO: 29.2 PG (ref 26.6–33)
MCHC RBC AUTO-ENTMCNC: 34.3 G/DL (ref 31.5–35.7)
MCV RBC AUTO: 85.1 FL (ref 79–97)
MDMA UR QL SCN: NEGATIVE
METHADONE UR QL SCN: NEGATIVE
MONOCYTES # BLD AUTO: 0.69 10*3/MM3 (ref 0.1–0.9)
MONOCYTES NFR BLD AUTO: 8.2 % (ref 5–12)
NEUTROPHILS NFR BLD AUTO: 4.71 10*3/MM3 (ref 1.7–7)
NEUTROPHILS NFR BLD AUTO: 56.3 % (ref 42.7–76)
NRBC BLD AUTO-RTO: 0 /100 WBC (ref 0–0.2)
OPIATES UR QL: NEGATIVE
OXYCODONE UR QL SCN: NEGATIVE
PCP UR QL SCN: NEGATIVE
PLATELET # BLD AUTO: 221 10*3/MM3 (ref 140–450)
PMV BLD AUTO: 11.3 FL (ref 6–12)
RBC # BLD AUTO: 4.69 10*6/MM3 (ref 3.77–5.28)
WBC NRBC COR # BLD: 8.37 10*3/MM3 (ref 3.4–10.8)

## 2023-02-28 PROCEDURE — 85025 COMPLETE CBC W/AUTO DIFF WBC: CPT

## 2023-02-28 PROCEDURE — 80305 DRUG TEST PRSMV DIR OPT OBS: CPT | Performed by: FAMILY MEDICINE

## 2023-02-28 PROCEDURE — 99214 OFFICE O/P EST MOD 30 MIN: CPT | Performed by: FAMILY MEDICINE

## 2023-02-28 PROCEDURE — 86803 HEPATITIS C AB TEST: CPT

## 2023-02-28 PROCEDURE — 36415 COLL VENOUS BLD VENIPUNCTURE: CPT

## 2023-02-28 PROCEDURE — 80061 LIPID PANEL: CPT

## 2023-02-28 PROCEDURE — 80053 COMPREHEN METABOLIC PANEL: CPT

## 2023-02-28 RX ORDER — DICLOFENAC SODIUM 75 MG/1
75 TABLET, DELAYED RELEASE ORAL 2 TIMES DAILY PRN
Qty: 60 TABLET | Refills: 2 | Status: SHIPPED | OUTPATIENT
Start: 2023-02-28

## 2023-02-28 RX ORDER — IBUPROFEN 200 MG
200 TABLET ORAL NIGHTLY PRN
COMMUNITY
End: 2023-02-28

## 2023-02-28 NOTE — ASSESSMENT & PLAN NOTE
BP doing much better with adding in the amlodipine at 2.5 mg.  He has been monitoring at home and blood pressure has been running at goal.  Continue to monitor.  We are going to have her move the amlodipine from that early evening to closer to midday so she gets more of an effect through the daytime hours.  No adverse effects.  No refills needed today.  Labs are reviewed and UTD.

## 2023-02-28 NOTE — PROGRESS NOTES
"Chief Complaint  Hypertension    Subjective          Yanni Huynh presents to [facility] today for routine follow-up on chronic issues.    She is on escitalopram and lorazepam for anxiety.  Sx have been well controlled.   Her mood is \"okay.\"   She uses lorazepam and melatonin for insomnia.  She has been on the lorazepam for many years.  She saw Dr. Demetrius Romero for sleep therapy in 2015 and he worked with her to significantly decrease the amount of medication she was using.  She was unable to stop the lorazepam altogether, however.  She has tried trazodone and Ambien previously but had intolerable side effects with both.      She is on atenolol amlodipine for hypertension.  We added the amlodipine at 2.5 mg daily the last time that she was in the office as her blood pressure has been running high the last few times that she has been seen here.  She does not check it regularly at home.  Denies chest pain, palpitations, or shortness of breath.  No pedal edema with the addition of amlodipine.  No other adverse effects.  She has been taking the amlodipine around 18:00 daily.  She has been taking the BP and it has been running 130s systolic.      She follows with Dr. Gallardo [facility] for macular degeneration.  Status post corneal transplant.    She has been using diclofenac for the chronic L shoulder.  The diclofenac really did extremely well for her.  However, she ran out of the diclofenac, so she went back to alternating ibuprofen and Tylenol.      Current Outpatient Medications:   •  amLODIPine (NORVASC) 2.5 MG tablet, Take 1 tablet by mouth Daily., Disp: 30 tablet, Rfl: 5  •  atenolol (TENORMIN) 25 MG tablet, 1 tablet PO QAM and 1/4 tab PO QHS, Disp: 135 tablet, Rfl: 1  •  Calcium Carb-Cholecalciferol (CALCIUM 600 + D PO), Take  by mouth Daily., Disp: , Rfl:   •  cyclobenzaprine (FLEXERIL) 5 MG tablet, Take 1 tablet by mouth At Night As Needed for Muscle Spasms., Disp: 30 " "tablet, Rfl: 0  •  diclofenac (VOLTAREN) 75 MG EC tablet, Take 1 tablet by mouth 2 (Two) Times a Day As Needed (shoulder pain)., Disp: 60 tablet, Rfl: 2  •  escitalopram (LEXAPRO) 10 MG tablet, Take 0.5 tablets by mouth Daily., Disp: 45 tablet, Rfl: 1  •  fluorometholone (FML) 0.1 % ophthalmic suspension, Administer 1 drop to both eyes Daily., Disp: , Rfl:   •  LORazepam (ATIVAN) 1 MG tablet, TAKE 1/2 TO 1 (ONE-HALF TO ONE) TABLET BY MOUTH AT NIGHT AS NEEDED FOR SLEEP, Disp: 30 tablet, Rfl: 0  •  Melatonin 1 MG capsule, Take 1.5 mg by mouth Every Night., Disp: , Rfl:   •  Misc Natural Products (CORTISOL MANAGER PO), Take  by mouth Every Night., Disp: , Rfl:   •  Multiple Vitamins-Minerals (PRESERVISION AREDS PO), Take 1 tablet by mouth Daily., Disp: , Rfl:   •  pilocarpine (PILOCAR) 1 % ophthalmic solution, , Disp: , Rfl:     Allergies:  Patient has no known allergies.      Objective   Vital Signs:   Vitals:    02/28/23 1557   BP: 155/80   BP Location: Left arm   Patient Position: Sitting   Cuff Size: Large Adult   Pulse: 60   Temp: 98.7 °F (37.1 °C)   TempSrc: Oral   Weight: 94.8 kg (209 lb)   Height: 160 cm (62.99\")       Physical Exam  Vitals reviewed.   Constitutional:       General: She is not in acute distress.     Appearance: Normal appearance. She is well-developed.   HENT:      Head: Normocephalic and atraumatic.      Right Ear: External ear normal.      Left Ear: External ear normal.   Eyes:      Extraocular Movements: Extraocular movements intact.      Conjunctiva/sclera: Conjunctivae normal.      Pupils: Pupils are equal, round, and reactive to light.   Cardiovascular:      Rate and Rhythm: Normal rate and regular rhythm.      Heart sounds: No murmur heard.  Pulmonary:      Effort: Pulmonary effort is normal.      Breath sounds: Normal breath sounds. No wheezing, rhonchi or rales.   Abdominal:      General: Bowel sounds are normal. There is no distension.      Palpations: Abdomen is soft.      " Tenderness: There is no abdominal tenderness.   Musculoskeletal:         General: Normal range of motion.   Neurological:      Mental Status: She is alert.   Psychiatric:         Mood and Affect: Affect normal.          Lab Results   Component Value Date    GLUCOSE 118 (H) 01/13/2022    BUN 20 01/13/2022    CREATININE 0.60 01/13/2022    EGFRIFNONA 97 01/13/2022    BCR 33.3 (H) 01/13/2022    K 4.2 01/13/2022    CO2 25.9 01/13/2022    CALCIUM 9.7 01/13/2022    ALBUMIN 4.50 01/13/2022    LABIL2 1.3 (L) 03/01/2021    AST 14 01/13/2022    ALT 17 01/13/2022       Lab Results   Component Value Date    CHLPL 185 03/01/2021    TRIG 106 03/01/2021    HDL 55 03/01/2021     (H) 03/01/2021            Assessment and Plan    Diagnoses and all orders for this visit:    1. Other specified anxiety disorders (Primary)  Assessment & Plan:  Stable on current regimen.  Symptoms are well controlled.  No adverse effects. She does require ongoing use of this controlled substance to function.  Tox screen was due today.  Prior tox screen appropriate.  OSWALDO was run today.  Refills were not needed today.  RTC 3 months.        2. Primary insomnia  Assessment & Plan:  As per anxiety plan.      3. Other long term (current) drug therapy  -     POC Urine Drug Screen [facility]    4. Essential (primary) hypertension  Assessment & Plan:  BP doing much better with adding in the amlodipine at 2.5 mg.  He has been monitoring at home and blood pressure has been running at goal.  Continue to monitor.  We are going to have her move the amlodipine from that early evening to closer to midday so she gets more of an effect through the daytime hours.  No adverse effects.  No refills needed today.  Labs are reviewed and UTD.    Orders:  -     Comprehensive Metabolic Panel; Future  -     Lipid Panel; Future  -     CBC Auto Differential; Future    5. Chronic left shoulder pain  Assessment & Plan:  The diclofenac was working great for her.  I will  restart that for her today.    Orders:  -     diclofenac (VOLTAREN) 75 MG EC tablet; Take 1 tablet by mouth 2 (Two) Times a Day As Needed (shoulder pain).  Dispense: 60 tablet; Refill: 2    6. Encounter for screening for other viral diseases  -     Hepatitis C Antibody; Future      Follow Up   Return in about 3 months (around 5/28/2023) for Medicare Wellness.  Patient was given instructions and counseling regarding her condition or for health maintenance advice. Please see specific information pulled into the AVS if appropriate.

## 2023-03-01 DIAGNOSIS — Z79.899 OTHER LONG TERM (CURRENT) DRUG THERAPY: ICD-10-CM

## 2023-03-01 LAB
ALBUMIN SERPL-MCNC: 4.7 G/DL (ref 3.5–5.2)
ALBUMIN/GLOB SERPL: 1.6 G/DL
ALP SERPL-CCNC: 95 U/L (ref 39–117)
ALT SERPL W P-5'-P-CCNC: 21 U/L (ref 1–33)
ANION GAP SERPL CALCULATED.3IONS-SCNC: 10.5 MMOL/L (ref 5–15)
AST SERPL-CCNC: 21 U/L (ref 1–32)
BILIRUB SERPL-MCNC: 0.7 MG/DL (ref 0–1.2)
BUN SERPL-MCNC: 25 MG/DL (ref 8–23)
BUN/CREAT SERPL: 36.2 (ref 7–25)
CALCIUM SPEC-SCNC: 9.7 MG/DL (ref 8.6–10.5)
CHLORIDE SERPL-SCNC: 99 MMOL/L (ref 98–107)
CHOLEST SERPL-MCNC: 182 MG/DL (ref 0–200)
CO2 SERPL-SCNC: 27.5 MMOL/L (ref 22–29)
CREAT SERPL-MCNC: 0.69 MG/DL (ref 0.57–1)
EGFRCR SERPLBLD CKD-EPI 2021: 88.4 ML/MIN/1.73
GLOBULIN UR ELPH-MCNC: 2.9 GM/DL
GLUCOSE SERPL-MCNC: 118 MG/DL (ref 65–99)
HDLC SERPL-MCNC: 48 MG/DL (ref 40–60)
LDLC SERPL CALC-MCNC: 107 MG/DL (ref 0–100)
LDLC/HDLC SERPL: 2.15 {RATIO}
POTASSIUM SERPL-SCNC: 4.2 MMOL/L (ref 3.5–5.2)
PROT SERPL-MCNC: 7.6 G/DL (ref 6–8.5)
SODIUM SERPL-SCNC: 137 MMOL/L (ref 136–145)
TRIGL SERPL-MCNC: 155 MG/DL (ref 0–150)
VLDLC SERPL-MCNC: 27 MG/DL (ref 5–40)

## 2023-03-01 PROCEDURE — G0480 DRUG TEST DEF 1-7 CLASSES: HCPCS | Performed by: FAMILY MEDICINE

## 2023-03-04 LAB — BENZODIAZ CTO UR CFM-MCNC: NEGATIVE NG/ML

## 2023-03-23 ENCOUNTER — OFFICE VISIT (OUTPATIENT)
Dept: FAMILY MEDICINE CLINIC | Age: 78
End: 2023-03-23
Payer: MEDICARE

## 2023-03-23 VITALS
WEIGHT: 208 LBS | HEIGHT: 63 IN | SYSTOLIC BLOOD PRESSURE: 137 MMHG | HEART RATE: 86 BPM | TEMPERATURE: 98.4 F | BODY MASS INDEX: 36.86 KG/M2 | OXYGEN SATURATION: 98 % | DIASTOLIC BLOOD PRESSURE: 59 MMHG

## 2023-03-23 DIAGNOSIS — J02.9 SORE THROAT: ICD-10-CM

## 2023-03-23 DIAGNOSIS — N30.00 ACUTE CYSTITIS WITHOUT HEMATURIA: ICD-10-CM

## 2023-03-23 DIAGNOSIS — R05.9 COUGH, UNSPECIFIED TYPE: ICD-10-CM

## 2023-03-23 DIAGNOSIS — R30.0 DYSURIA: ICD-10-CM

## 2023-03-23 DIAGNOSIS — J01.00 ACUTE NON-RECURRENT MAXILLARY SINUSITIS: Primary | ICD-10-CM

## 2023-03-23 LAB
BILIRUB BLD-MCNC: NEGATIVE MG/DL
CLARITY, POC: ABNORMAL
COLOR UR: ABNORMAL
EXPIRATION DATE: ABNORMAL
EXPIRATION DATE: NORMAL
EXPIRATION DATE: NORMAL
FLUAV AG UPPER RESP QL IA.RAPID: NOT DETECTED
FLUBV AG UPPER RESP QL IA.RAPID: NOT DETECTED
GLUCOSE UR STRIP-MCNC: NEGATIVE MG/DL
INTERNAL CONTROL: NORMAL
INTERNAL CONTROL: NORMAL
KETONES UR QL: NEGATIVE
LEUKOCYTE EST, POC: ABNORMAL
Lab: ABNORMAL
Lab: NORMAL
Lab: NORMAL
NITRITE UR-MCNC: NEGATIVE MG/ML
PH UR: 5.5 [PH] (ref 5–8)
PROT UR STRIP-MCNC: NEGATIVE MG/DL
RBC # UR STRIP: NEGATIVE /UL
S PYO AG THROAT QL: NEGATIVE
SARS-COV-2 AG UPPER RESP QL IA.RAPID: NOT DETECTED
SP GR UR: 1.03 (ref 1–1.03)
UROBILINOGEN UR QL: NORMAL

## 2023-03-23 PROCEDURE — 87186 SC STD MICRODIL/AGAR DIL: CPT | Performed by: NURSE PRACTITIONER

## 2023-03-23 PROCEDURE — 87088 URINE BACTERIA CULTURE: CPT | Performed by: NURSE PRACTITIONER

## 2023-03-23 PROCEDURE — 3078F DIAST BP <80 MM HG: CPT | Performed by: NURSE PRACTITIONER

## 2023-03-23 PROCEDURE — 1159F MED LIST DOCD IN RCRD: CPT | Performed by: NURSE PRACTITIONER

## 2023-03-23 PROCEDURE — 81003 URINALYSIS AUTO W/O SCOPE: CPT | Performed by: NURSE PRACTITIONER

## 2023-03-23 PROCEDURE — 87086 URINE CULTURE/COLONY COUNT: CPT | Performed by: NURSE PRACTITIONER

## 2023-03-23 PROCEDURE — 1160F RVW MEDS BY RX/DR IN RCRD: CPT | Performed by: NURSE PRACTITIONER

## 2023-03-23 PROCEDURE — 87880 STREP A ASSAY W/OPTIC: CPT | Performed by: NURSE PRACTITIONER

## 2023-03-23 PROCEDURE — 3075F SYST BP GE 130 - 139MM HG: CPT | Performed by: NURSE PRACTITIONER

## 2023-03-23 PROCEDURE — 87428 SARSCOV & INF VIR A&B AG IA: CPT | Performed by: NURSE PRACTITIONER

## 2023-03-23 PROCEDURE — 87081 CULTURE SCREEN ONLY: CPT | Performed by: NURSE PRACTITIONER

## 2023-03-23 PROCEDURE — 99214 OFFICE O/P EST MOD 30 MIN: CPT | Performed by: NURSE PRACTITIONER

## 2023-03-23 RX ORDER — BENZONATATE 200 MG/1
200 CAPSULE ORAL 3 TIMES DAILY PRN
Qty: 30 CAPSULE | Refills: 0 | Status: SHIPPED | OUTPATIENT
Start: 2023-03-23

## 2023-03-23 RX ORDER — CEPHALEXIN 500 MG/1
1 CAPSULE ORAL EVERY 12 HOURS SCHEDULED
COMMUNITY
Start: 2023-03-10 | End: 2023-03-23

## 2023-03-23 RX ORDER — AZITHROMYCIN 250 MG/1
TABLET, FILM COATED ORAL
Qty: 6 TABLET | Refills: 0 | Status: SHIPPED | OUTPATIENT
Start: 2023-03-23

## 2023-03-23 NOTE — PROGRESS NOTES
"Chief Complaint  Nasal Congestion (Sx started Saturday ), Cough, Headache, Fever (Low grade ), Eye Drainage, Chills, Sore Throat, Wheezing, Earache, and Difficulty Urinating    Subjective        Yanni Huynh presents to [facility]  Cough  This is a new problem. The current episode started in the past 7 days. The problem has been unchanged. The cough is non-productive. Associated symptoms include chills, ear pain, a fever, headaches, nasal congestion, postnasal drip, rhinorrhea, a sore throat, shortness of breath and wheezing. Pertinent negatives include no chest pain. She has tried OTC cough suppressant for the symptoms. The treatment provided no relief. Her past medical history is significant for bronchitis and environmental allergies.   Difficulty Urinating  This is a new problem. The current episode started in the past 7 days. The problem has been gradually worsening. Associated symptoms include chills, coughing, a fever, headaches, a sore throat and urinary symptoms. Pertinent negatives include no chest pain.       Objective   Vital Signs:  /59 (BP Location: Left arm, Patient Position: Sitting, Cuff Size: Large Adult)   Pulse 86   Temp 98.4 °F (36.9 °C) (Oral)   Ht 160 cm (62.99\")   Wt 94.3 kg (208 lb)   SpO2 98% Comment: room air  BMI 36.86 kg/m²   Estimated body mass index is 36.86 kg/m² as calculated from the following:    Height as of this encounter: 160 cm (62.99\").    Weight as of this encounter: 94.3 kg (208 lb).             Physical Exam  HENT:      Head: Normocephalic.      Right Ear: A middle ear effusion is present.      Left Ear: A middle ear effusion is present.      Nose: Congestion present.      Right Sinus: Maxillary sinus tenderness present.      Left Sinus: Maxillary sinus tenderness present.      Mouth/Throat:      Pharynx: Posterior oropharyngeal erythema present.   Cardiovascular:      Rate and Rhythm: Normal rate and regular rhythm. "   Pulmonary:      Effort: Pulmonary effort is normal. No respiratory distress.      Breath sounds: Normal breath sounds. No stridor. No wheezing, rhonchi or rales.   Abdominal:      Tenderness: There is no right CVA tenderness or left CVA tenderness.   Skin:     General: Skin is warm and dry.   Neurological:      Mental Status: She is alert and oriented to person, place, and time.   Psychiatric:         Mood and Affect: Mood normal.        Result Review :          Results for orders placed or performed in visit on 03/23/23   Beta Strep Culture, Throat - Swab, Throat    Specimen: Throat; Swab   Result Value Ref Range    Throat Culture, Beta Strep No Beta Hemolytic Streptococcus Isolated    Urine Culture - Urine, Urine, Clean Catch    Specimen: Urine, Clean Catch   Result Value Ref Range    Urine Culture >100,000 CFU/mL Escherichia coli (A)        Susceptibility    Escherichia coli - KRISHAN     Ampicillin  Susceptible ug/ml     Ampicillin + Sulbactam  Susceptible ug/ml     Cefazolin  Susceptible ug/ml     Cefepime  Susceptible ug/ml     Ceftazidime  Susceptible ug/ml     Ceftriaxone  Susceptible ug/ml     Gentamicin  Susceptible ug/ml     Levofloxacin  Susceptible ug/ml     Nitrofurantoin  Susceptible ug/ml     Piperacillin + Tazobactam  Susceptible ug/ml     Trimethoprim + Sulfamethoxazole  Susceptible ug/ml   POCT SARS-CoV-2 Antigen WHITNEY + Flu    Specimen: Swab   Result Value Ref Range    SARS Antigen Not Detected Not Detected, Presumptive Negative    Influenza A Antigen WHITNEY Not Detected Not Detected    Influenza B Antigen WHITNEY Not Detected Not Detected    Internal Control Passed Passed    Lot Number [id number]     Expiration Date 12/19/23    POCT rapid strep A    Specimen: Swab   Result Value Ref Range    Rapid Strep A Screen Negative Negative, VALID, INVALID, Not Performed    Internal Control Passed Passed    Lot Number [id number]     Expiration Date 6,302,024    POCT urinalysis dipstick, automated    Specimen: Urine    Result Value Ref Range    Color Dark Yellow Yellow, Straw, Dark Yellow, Bella    Clarity, UA Cloudy (A) Clear    Specific Gravity  1.030 1.005 - 1.030    pH, Urine 5.5 5.0 - 8.0    Leukocytes Small (1+) (A) Negative    Nitrite, UA Negative Negative    Protein, POC Negative Negative mg/dL    Glucose, UA Negative Negative mg/dL    Ketones, UA Negative Negative    Urobilinogen, UA Normal Normal, 0.2 E.U./dL    Bilirubin Negative Negative    Blood, UA Negative Negative    Lot Number [id number]     Expiration Date 08-30-23               Assessment and Plan   Diagnoses and all orders for this visit:    1. Acute non-recurrent maxillary sinusitis (Primary)  -     azithromycin (Zithromax Z-Kota) 250 MG tablet; Take 2 tablets by mouth on day 1, then 1 tablet daily on days 2-5  Dispense: 6 tablet; Refill: 0    2. Cough, unspecified type  -     POCT SARS-CoV-2 Antigen WHITNEY + Flu  -     Beta Strep Culture, Throat - , Throat; Future  -     Beta Strep Culture, Throat - Swab, Throat  -     benzonatate (TESSALON) 200 MG capsule; Take 1 capsule by mouth 3 (Three) Times a Day As Needed for Cough.  Dispense: 30 capsule; Refill: 0    3. Sore throat  -     POCT rapid strep A  -     Beta Strep Culture, Throat - , Throat; Future  -     Beta Strep Culture, Throat - Swab, Throat    4. Dysuria  -     POCT urinalysis dipstick, automated  -     Urine Culture - Urine, Urine, Clean Catch; Future  -     Urine Culture - Urine, Urine, Clean Catch    5. Acute cystitis without hematuria  -     nitrofurantoin, macrocrystal-monohydrate, (Macrobid) 100 MG capsule; Take 1 capsule by mouth 2 (Two) Times a Day for 5 days.  Dispense: 10 capsule; Refill: 0             Follow Up   Return if symptoms worsen or fail to improve.  Patient was given instructions and counseling regarding her condition or for health maintenance advice. Please see specific information pulled into the AVS if appropriate.

## 2023-03-25 LAB
BACTERIA SPEC AEROBE CULT: ABNORMAL
BACTERIA SPEC AEROBE CULT: NORMAL

## 2023-03-25 RX ORDER — NITROFURANTOIN 25; 75 MG/1; MG/1
100 CAPSULE ORAL 2 TIMES DAILY
Qty: 10 CAPSULE | Refills: 0 | Status: SHIPPED | OUTPATIENT
Start: 2023-03-25 | End: 2023-03-30

## 2023-03-27 NOTE — PROGRESS NOTES
Urine culture positive. Antibiotic sent to pharmacy. Take one tablet twice daily with food for 5 days. Follow up with PCP if symptoms continue after completion of antibiotic.

## 2023-04-18 DIAGNOSIS — F41.9 ANXIETY: ICD-10-CM

## 2023-04-18 RX ORDER — ESCITALOPRAM OXALATE 10 MG/1
TABLET ORAL
Qty: 45 TABLET | Refills: 0 | Status: SHIPPED | OUTPATIENT
Start: 2023-04-18

## 2023-04-19 RX ORDER — LORAZEPAM 1 MG/1
TABLET ORAL
Qty: 30 TABLET | Refills: 0 | Status: SHIPPED | OUTPATIENT
Start: 2023-04-19

## 2023-06-14 ENCOUNTER — OFFICE VISIT (OUTPATIENT)
Dept: FAMILY MEDICINE CLINIC | Age: 78
End: 2023-06-14
Payer: MEDICARE

## 2023-06-14 VITALS
BODY MASS INDEX: 36.86 KG/M2 | HEIGHT: 63 IN | WEIGHT: 208 LBS | OXYGEN SATURATION: 96 % | DIASTOLIC BLOOD PRESSURE: 80 MMHG | HEART RATE: 50 BPM | SYSTOLIC BLOOD PRESSURE: 137 MMHG

## 2023-06-14 DIAGNOSIS — Z79.899 OTHER LONG TERM (CURRENT) DRUG THERAPY: ICD-10-CM

## 2023-06-14 DIAGNOSIS — Z00.00 ANNUAL PHYSICAL EXAM: Primary | ICD-10-CM

## 2023-06-14 DIAGNOSIS — I10 ESSENTIAL (PRIMARY) HYPERTENSION: ICD-10-CM

## 2023-06-14 DIAGNOSIS — E66.01 CLASS 2 SEVERE OBESITY DUE TO EXCESS CALORIES WITH SERIOUS COMORBIDITY AND BODY MASS INDEX (BMI) OF 36.0 TO 36.9 IN ADULT: ICD-10-CM

## 2023-06-14 DIAGNOSIS — F41.8 OTHER SPECIFIED ANXIETY DISORDERS: ICD-10-CM

## 2023-06-14 PROBLEM — E66.09 CLASS 2 OBESITY DUE TO EXCESS CALORIES WITH BODY MASS INDEX (BMI) OF 36.0 TO 36.9 IN ADULT: Status: ACTIVE | Noted: 2023-06-14

## 2023-06-14 PROBLEM — E66.812 CLASS 2 OBESITY DUE TO EXCESS CALORIES WITH BODY MASS INDEX (BMI) OF 36.0 TO 36.9 IN ADULT: Status: ACTIVE | Noted: 2023-06-14

## 2023-06-14 RX ORDER — ATENOLOL 25 MG/1
TABLET ORAL
Qty: 135 TABLET | Refills: 1 | Status: SHIPPED | OUTPATIENT
Start: 2023-06-14

## 2023-06-14 RX ORDER — AMLODIPINE BESYLATE 2.5 MG/1
2.5 TABLET ORAL DAILY
Qty: 90 TABLET | Refills: 1 | Status: SHIPPED | OUTPATIENT
Start: 2023-06-14

## 2023-06-14 NOTE — ASSESSMENT & PLAN NOTE
Patient's (Body mass index is 36.85 kg/m².) indicates that they are morbidly/severely obese (BMI > 40 or > 35 with obesity - related health condition) with health conditions that include hypertension . Weight is unchanged. BMI  is above average; BMI management plan is completed. We discussed portion control and increasing exercise.

## 2023-06-14 NOTE — ASSESSMENT & PLAN NOTE
She is UTD on colonoscopy, last done 11/2017 and this was normal.  Ten year repeat recommended.  Pap smear is no longer indicated by age and history; s/p hysterectomy.  She is UTD on mammogram, last done 7/2022 and this was normal (Dr. Apryl Whyte manages this).  She is due for DEXA, last done 7/2020 and this showed osteopenia; she prefers to discuss this with her GYN.  She is due for COVID, [id number], Shingrix; declines all vaccines today.  She is UTD on Td (1/2019).  Flu shot not currently indicated.  She is UTD on routine labs including HTN panel and CBC.

## 2023-06-14 NOTE — ASSESSMENT & PLAN NOTE
BP has generally been running at goal, although she is a little elevated on initial check today.  Continue amlodipine 2.5 mg daily and atenolol 25 mg daily.  Refills sent.  Labs are reviewed and UTD.  Continue to monitor.

## 2023-06-14 NOTE — PROGRESS NOTES
The ABCs of the Annual Wellness Visit  Subsequent Medicare Wellness Visit    Subjective    Yanni Huynh is a 77 y.o. female who presents for a Subsequent Medicare Wellness Visit.    She is UTD on colonoscopy, last done 11/2017 and this was normal.  Ten year repeat recommended.  Pap smear is no longer indicated by age and history; s/p hysterectomy.  She is UTD on mammogram, last done 7/2022 and this was normal (Dr. Apryl Whyte manages this).  She is due for DEXA, last done 7/2020 and this showed osteopenia.  She is due for COVID, [id number], Shingrix.  She is UTD on Td (1/2019).  Flu shot not currently indicated.  She is UTD on routine labs including HTN panel and CBC.     The following portions of the patient's history were reviewed and   updated as appropriate: allergies, current medications, past family history, past medical history, past social history, past surgical history, and problem list.    Compared to one year ago, the patient feels her physical   health is the same.    Compared to one year ago, the patient feels her mental   health is the same.    Recent Hospitalizations:  She was not admitted to the hospital during the last year.       Current Medical Providers:  Patient Care Team:  Maria E Melgar MD as PCP - General (Family Medicine)    Outpatient Medications Prior to Visit   Medication Sig Dispense Refill    Calcium Carb-Cholecalciferol (CALCIUM 600 + D PO) Take  by mouth Daily.      cyclobenzaprine (FLEXERIL) 5 MG tablet Take 1 tablet by mouth At Night As Needed for Muscle Spasms. 30 tablet 0    escitalopram (LEXAPRO) 10 MG tablet Take 1/2 (one-half) tablet by mouth once daily 45 tablet 0    fluorometholone (FML) 0.1 % ophthalmic suspension Administer 1 drop to both eyes Daily.      LORazepam (ATIVAN) 1 MG tablet Take 1/2 to 1 tablet by mouth at night as needed for sleep 30 tablet 0    Melatonin 1 MG capsule Take 1.5 mg by mouth Every Night.      Misc Natural Products (CORTISOL MANAGER  PO) Take  by mouth Every Night.      Multiple Vitamins-Minerals (PRESERVISION AREDS PO) Take 1 tablet by mouth Daily.      pilocarpine (PILOCAR) 1 % ophthalmic solution       amLODIPine (NORVASC) 2.5 MG tablet Take 1 tablet by mouth Daily. 30 tablet 5    atenolol (TENORMIN) 25 MG tablet 1 tablet PO QAM and 1/4 tab PO  tablet 1    azithromycin (Zithromax Z-Kota) 250 MG tablet Take 2 tablets by mouth on day 1, then 1 tablet daily on days 2-5 (Patient not taking: Reported on 6/14/2023) 6 tablet 0    benzonatate (TESSALON) 200 MG capsule Take 1 capsule by mouth 3 (Three) Times a Day As Needed for Cough. (Patient not taking: Reported on 6/14/2023) 30 capsule 0    diclofenac (VOLTAREN) 75 MG EC tablet Take 1 tablet by mouth 2 (Two) Times a Day As Needed (shoulder pain). (Patient not taking: Reported on 6/14/2023) 60 tablet 2     No facility-administered medications prior to visit.       No opioid medication identified on active medication list. I have reviewed chart for other potential  high risk medication/s and harmful drug interactions in the elderly.        Aspirin is not on active medication list.  Aspirin use is not indicated based on review of current medical condition/s. Risk of harm outweighs potential benefits.  .    Patient Active Problem List   Diagnosis    Ulnar neuritis, left    Primary osteoarthritis of right knee    Basal cell carcinoma    Fibrocystic breast changes    Incisional hernia    Essential (primary) hypertension    Other long term (current) drug therapy    Other specified anxiety disorders    History of falling    Primary insomnia    Corneal transplant status    Bilateral nonexudative age-related macular degeneration    Other constipation    Annual physical exam    Stress incontinence    Chronic left shoulder pain    Class 2 obesity due to excess calories with body mass index (BMI) of 36.0 to 36.9 in adult     Advance Care Planning   Advance Care Planning     Advance Directive is not on  "file.  ACP discussion was held with the patient during this visit. Patient does not have an advance directive, information provided.     Objective    Vitals:    23 1454 23 1556   BP: 147/83 137/80   BP Location: Left arm Left arm   Patient Position: Sitting Sitting   Cuff Size: Large Adult Adult   Pulse: 53 50   SpO2: 96%    Weight: 94.3 kg (208 lb)    Height: 160 cm (62.99\")      Estimated body mass index is 36.85 kg/m² as calculated from the following:    Height as of this encounter: 160 cm (62.99\").    Weight as of this encounter: 94.3 kg (208 lb).    Class 2 Severe Obesity (BMI >=35 and <=39.9). Obesity-related health conditions include the following: hypertension. Obesity is unchanged. BMI is is above average; BMI management plan is completed. We discussed portion control and increasing exercise.      Does the patient have evidence of cognitive impairment? No          HEALTH RISK ASSESSMENT    Smoking Status:  Social History     Tobacco Use   Smoking Status Never   Smokeless Tobacco Never     Alcohol Consumption:  Social History     Substance and Sexual Activity   Alcohol Use No     Fall Risk Screen:    STEADI Fall Risk Assessment was completed, and patient is at MODERATE risk for falls. Assessment completed on:2023    Depression Screenin/14/2023     2:56 PM   PHQ-2/PHQ-9 Depression Screening   Little Interest or Pleasure in Doing Things 0-->not at all   Feeling Down, Depressed or Hopeless 0-->not at all   PHQ-9: Brief Depression Severity Measure Score 0       Health Habits and Functional and Cognitive Screenin/14/2023     2:56 PM   Functional & Cognitive Status   Do you have difficulty preparing food and eating? No   Do you have difficulty bathing yourself, getting dressed or grooming yourself? No   Do you have difficulty using the toilet? No   Do you have difficulty moving around from place to place? No   Do you have trouble with steps or getting out of a bed or a chair? No "   Current Diet Well Balanced Diet   Dental Exam Up to date   Eye Exam Up to date   Exercise (times per week) 0 times per week   Current Exercises Include No Regular Exercise   Do you need help using the phone?  No   Are you deaf or do you have serious difficulty hearing?  No   Do you need help with transportation? Yes   Do you need help shopping? No   Do you need help preparing meals?  No   Do you need help with housework?  No   Do you need help with laundry? No   Do you need help taking your medications? No   Do you need help managing money? No   Do you ever drive or ride in a car without wearing a seat belt? No   Have you felt unusual stress, anger or loneliness in the last month? No   Who do you live with? Spouse   If you need help, do you have trouble finding someone available to you? No   Have you been bothered in the last four weeks by sexual problems? No   Do you have difficulty concentrating, remembering or making decisions? No       Age-appropriate Screening Schedule:  Refer to the list below for future screening recommendations based on patient's age, sex and/or medical conditions. Orders for these recommended tests are listed in the plan section. The patient has been provided with a written plan.    Health Maintenance   Topic Date Due    ZOSTER VACCINE (1 of 2) Never done    DXA SCAN  06/14/2023 (Originally 7/15/2022)    COVID-19 Vaccine (2 - Bryson risk series) 06/16/2023 (Originally 6/4/2021)    Pneumococcal Vaccine 65+ (1 - PCV) 06/14/2024 (Originally 12/4/1951)    INFLUENZA VACCINE  10/01/2023    ANNUAL WELLNESS VISIT  06/14/2024    MAMMOGRAM  07/20/2024    COLORECTAL CANCER SCREENING  11/13/2027    TDAP/TD VACCINES (2 - Tdap) 01/01/2029    HEPATITIS C SCREENING  Completed                  CMS Preventative Services Quick Reference  Risk Factors Identified During Encounter  Immunizations Discussed/Encouraged: Tdap, Prevnar 20 (Pneumococcal 20-valent conjugate), Shingrix, and COVID19  The above  "risks/problems have been discussed with the patient.  Pertinent information has been shared with the patient in the After Visit Summary.  An After Visit Summary and PPPS were made available to the patient.    Follow Up:   Next Medicare Wellness visit to be scheduled in 1 year.       Additional E&M Note during same encounter follows:  Patient has multiple medical problems which are significant and separately identifiable that require additional work above and beyond the Medicare Wellness Visit.      Chief Complaint  Medicare Wellness-subsequent    Subjective        HPI  Yanni Huynh is also being seen today for routine f/u on chronic problems.    Her L ear has bothered her ever since COVID.  She has a nasal spray that seems to clear up the ear pressure and L nasal pain.  She does have a diagnosed deviated septum.      She is on Lexapro and lorazepam for anxiety.  Her symptoms have been well controlled.  Mood is \"just fine.\"   She is on lorazepam and melatonin for insomnia, which she has taken for many years.  She saw Dr. Demetrius Romero for sleep therapy in 2015 and he worked with her to significantly decrease the amount of medication she was using.  She was unable to stop the lorazepam altogether, however.  She has tried trazodone and Ambien previously but had intolerable side effects with both.      She is on atenolol and amlodipine for hypertension.  Blood pressure has been well controlled since adding amlodipine.  No chest pain, palpitations, or shortness of breath.  No pedal edema with the addition of amlodipine.  Denies other adverse effects.     She follows with Dr. Gallardo [facility] for macular degeneration.  Status post corneal transplant.    Review of Systems   Constitutional:  Negative for chills, fatigue and fever.   HENT:  Negative for congestion, hearing loss and rhinorrhea.    Eyes:  Negative for pain and visual disturbance.   Respiratory:  Negative for cough and shortness of breath.  " "  Cardiovascular:  Negative for chest pain and palpitations.   Gastrointestinal:  Negative for abdominal pain, constipation, diarrhea, nausea and vomiting.   Genitourinary:  Negative for difficulty urinating and dysuria.   Musculoskeletal:  Positive for arthralgias. Negative for myalgias.        +bunion L foot   Neurological:  Negative for weakness and numbness.   Psychiatric/Behavioral:  Negative for dysphoric mood and sleep disturbance. The patient is nervous/anxious.      Objective   Vital Signs:  /80 (BP Location: Left arm, Patient Position: Sitting, Cuff Size: Adult)   Pulse 50   Ht 160 cm (62.99\")   Wt 94.3 kg (208 lb)   SpO2 96%   BMI 36.85 kg/m²     Physical Exam  Vitals reviewed.   Constitutional:       General: She is not in acute distress.     Appearance: Normal appearance. She is well-developed.   HENT:      Head: Normocephalic and atraumatic.      Right Ear: External ear normal.      Left Ear: External ear normal.      Mouth/Throat:      Mouth: Mucous membranes are moist.   Eyes:      Extraocular Movements: Extraocular movements intact.      Conjunctiva/sclera: Conjunctivae normal.      Pupils: Pupils are equal, round, and reactive to light.   Cardiovascular:      Rate and Rhythm: Normal rate and regular rhythm.      Heart sounds: No murmur heard.  Pulmonary:      Effort: Pulmonary effort is normal.      Breath sounds: Normal breath sounds. No wheezing, rhonchi or rales.   Abdominal:      General: Bowel sounds are normal. There is no distension.      Palpations: Abdomen is soft.      Tenderness: There is no abdominal tenderness.   Musculoskeletal:         General: Normal range of motion.      Comments: +L foot bunion   Skin:     General: Skin is warm and dry.   Neurological:      Mental Status: She is alert and oriented to person, place, and time.      Deep Tendon Reflexes: Reflexes normal.   Psychiatric:         Mood and Affect: Mood and affect normal.         Behavior: Behavior normal.    "      Thought Content: Thought content normal.         Judgment: Judgment normal.                  Lab Results   Component Value Date    GLUCOSE 118 (H) 02/28/2023    BUN 25 (H) 02/28/2023    CREATININE 0.69 02/28/2023    EGFR 88.4 02/28/2023    BCR 36.2 (H) 02/28/2023    K 4.2 02/28/2023    CO2 27.5 02/28/2023    CALCIUM 9.7 02/28/2023    ALBUMIN 4.7 02/28/2023    BILITOT 0.7 02/28/2023    AST 21 02/28/2023    ALT 21 02/28/2023     Lab Results   Component Value Date    CHOL 182 02/28/2023    CHLPL 185 03/01/2021    TRIG 155 (H) 02/28/2023    HDL 48 02/28/2023     (H) 02/28/2023     Lab Results   Component Value Date    CHOL 182 02/28/2023    CHLPL 185 03/01/2021    TRIG 155 (H) 02/28/2023    HDL 48 02/28/2023     (H) 02/28/2023          Assessment and Plan   Diagnoses and all orders for this visit:    1. Annual physical exam (Primary)  Assessment & Plan:  She is UTD on colonoscopy, last done 11/2017 and this was normal.  Ten year repeat recommended.  Pap smear is no longer indicated by age and history; s/p hysterectomy.  She is UTD on mammogram, last done 7/2022 and this was normal (Dr. Apryl Whyte manages this).  She is due for DEXA, last done 7/2020 and this showed osteopenia; she prefers to discuss this with her GYN.  She is due for COVID, [id number], Shingrix; declines all vaccines today.  She is UTD on Td (1/2019).  Flu shot not currently indicated.  She is UTD on routine labs including HTN panel and CBC.      2. Other specified anxiety disorders  Assessment & Plan:  Stable on current regimen.  Symptoms are well controlled.  No adverse effects. She does require ongoing use of this controlled substance to function.  Tox screen was due today.  Prior tox screen appropriate.  OSWALDO was run today.  Refills were not needed today.  RTC 3 months.        3. Other long term (current) drug therapy  -     POC Urine Drug Screen [facility]    4. Class 2 severe obesity due to excess calories with  serious comorbidity and body mass index (BMI) of 36.0 to 36.9 in adult  Assessment & Plan:  Patient's (Body mass index is 36.85 kg/m².) indicates that they are morbidly/severely obese (BMI > 40 or > 35 with obesity - related health condition) with health conditions that include hypertension . Weight is unchanged. BMI  is above average; BMI management plan is completed. We discussed portion control and increasing exercise.       5. Essential (primary) hypertension  Assessment & Plan:  BP has generally been running at goal, although she is a little elevated on initial check today.  Continue amlodipine 2.5 mg daily and atenolol 25 mg daily.  Refills sent.  Labs are reviewed and UTD.  Continue to monitor.    Orders:  -     amLODIPine (NORVASC) 2.5 MG tablet; Take 1 tablet by mouth Daily.  Dispense: 90 tablet; Refill: 1  -     atenolol (TENORMIN) 25 MG tablet; 1 tablet PO QAM and 1/4 tab PO QHS  Dispense: 135 tablet; Refill: 1             Follow Up   Return in about 3 months (around 9/14/2023) for Recheck.  Patient was given instructions and counseling regarding her condition or for health maintenance advice. Please see specific information pulled into the AVS if appropriate.

## 2023-08-29 ENCOUNTER — TELEPHONE (OUTPATIENT)
Dept: FAMILY MEDICINE CLINIC | Age: 78
End: 2023-08-29
Payer: MEDICARE

## 2023-08-29 ENCOUNTER — LAB (OUTPATIENT)
Dept: LAB | Facility: HOSPITAL | Age: 78
End: 2023-08-29
Payer: MEDICARE

## 2023-08-29 DIAGNOSIS — R35.0 URINARY FREQUENCY: ICD-10-CM

## 2023-08-29 DIAGNOSIS — R35.0 URINARY FREQUENCY: Primary | ICD-10-CM

## 2023-08-29 LAB
BILIRUB UR QL STRIP: NEGATIVE
CLARITY UR: CLEAR
COLOR UR: YELLOW
GLUCOSE UR STRIP-MCNC: NEGATIVE MG/DL
HGB UR QL STRIP.AUTO: NEGATIVE
KETONES UR QL STRIP: NEGATIVE
LEUKOCYTE ESTERASE UR QL STRIP.AUTO: NEGATIVE
NITRITE UR QL STRIP: NEGATIVE
PH UR STRIP.AUTO: <=5 [PH] (ref 5–8)
PROT UR QL STRIP: NEGATIVE
SP GR UR STRIP: >=1.03 (ref 1–1.03)
UROBILINOGEN UR QL STRIP: NORMAL

## 2023-08-29 PROCEDURE — 81003 URINALYSIS AUTO W/O SCOPE: CPT

## 2023-08-29 NOTE — TELEPHONE ENCOUNTER
2-week history of urinary frequency and pressure.  Requesting urinalysis today to assess for possible UTI.  Ordered.  BZM

## 2023-09-15 ENCOUNTER — OFFICE VISIT (OUTPATIENT)
Dept: FAMILY MEDICINE CLINIC | Age: 78
End: 2023-09-15
Payer: MEDICARE

## 2023-09-15 ENCOUNTER — HOSPITAL ENCOUNTER (OUTPATIENT)
Dept: GENERAL RADIOLOGY | Facility: HOSPITAL | Age: 78
Discharge: HOME | End: 2023-09-15
Admitting: FAMILY MEDICINE
Payer: MEDICARE

## 2023-09-15 VITALS
TEMPERATURE: 97.8 F | BODY MASS INDEX: 37.49 KG/M2 | HEIGHT: 63 IN | SYSTOLIC BLOOD PRESSURE: 165 MMHG | HEART RATE: 54 BPM | WEIGHT: 211.6 LBS | DIASTOLIC BLOOD PRESSURE: 73 MMHG

## 2023-09-15 DIAGNOSIS — F41.1 GENERALIZED ANXIETY DISORDER: Primary | ICD-10-CM

## 2023-09-15 DIAGNOSIS — Z79.899 OTHER LONG TERM (CURRENT) DRUG THERAPY: ICD-10-CM

## 2023-09-15 DIAGNOSIS — R10.32 LEFT GROIN PAIN: ICD-10-CM

## 2023-09-15 DIAGNOSIS — N76.0 ACUTE VAGINITIS: ICD-10-CM

## 2023-09-15 PROBLEM — Z00.00 ANNUAL PHYSICAL EXAM: Status: RESOLVED | Noted: 2022-05-10 | Resolved: 2023-09-15

## 2023-09-15 LAB
AMPHET+METHAMPHET UR QL: NEGATIVE
AMPHETAMINES UR QL: NEGATIVE
BARBITURATES UR QL SCN: NEGATIVE
BENZODIAZ UR QL SCN: NEGATIVE
BUPRENORPHINE SERPL-MCNC: NEGATIVE NG/ML
CANDIDA SPECIES: NEGATIVE
CANNABINOIDS SERPL QL: NEGATIVE
COCAINE UR QL: NEGATIVE
EXPIRATION DATE: NORMAL
GARDNERELLA VAGINALIS: POSITIVE
Lab: NORMAL
MDMA UR QL SCN: NEGATIVE
METHADONE UR QL SCN: NEGATIVE
OPIATES UR QL: NEGATIVE
OXYCODONE UR QL SCN: NEGATIVE
PCP UR QL SCN: NEGATIVE
T VAGINALIS DNA VAG QL PROBE+SIG AMP: NEGATIVE

## 2023-09-15 PROCEDURE — 87480 CANDIDA DNA DIR PROBE: CPT | Performed by: FAMILY MEDICINE

## 2023-09-15 PROCEDURE — 1160F RVW MEDS BY RX/DR IN RCRD: CPT | Performed by: FAMILY MEDICINE

## 2023-09-15 PROCEDURE — G0480 DRUG TEST DEF 1-7 CLASSES: HCPCS | Performed by: FAMILY MEDICINE

## 2023-09-15 PROCEDURE — 80305 DRUG TEST PRSMV DIR OPT OBS: CPT | Performed by: FAMILY MEDICINE

## 2023-09-15 PROCEDURE — 99214 OFFICE O/P EST MOD 30 MIN: CPT | Performed by: FAMILY MEDICINE

## 2023-09-15 PROCEDURE — 87660 TRICHOMONAS VAGIN DIR PROBE: CPT | Performed by: FAMILY MEDICINE

## 2023-09-15 PROCEDURE — 3078F DIAST BP <80 MM HG: CPT | Performed by: FAMILY MEDICINE

## 2023-09-15 PROCEDURE — 1159F MED LIST DOCD IN RCRD: CPT | Performed by: FAMILY MEDICINE

## 2023-09-15 PROCEDURE — 87510 GARDNER VAG DNA DIR PROBE: CPT | Performed by: FAMILY MEDICINE

## 2023-09-15 PROCEDURE — 73502 X-RAY EXAM HIP UNI 2-3 VIEWS: CPT

## 2023-09-15 PROCEDURE — 3077F SYST BP >= 140 MM HG: CPT | Performed by: FAMILY MEDICINE

## 2023-09-15 NOTE — ASSESSMENT & PLAN NOTE
She is tender to palpation over the left groin.  I suspect muscle strain when she was climbing out of the tub 2 to 3 months ago.  Alternate ice and heat to the area.  Checking x-ray today to rule out anything more serious.

## 2023-09-15 NOTE — ASSESSMENT & PLAN NOTE
Vaginal screen sample obtained today.  She does have some thin white runny discharge.  No atrophic vaginitis noted no lesions or rash.

## 2023-09-15 NOTE — PROGRESS NOTES
"Chief Complaint  Pelvic Pain    Subjective          Yanni Huynh presents to [facility] today for follow-up of routine problems.    She is having vaginal pain, radiating to the top of the left leg.  She likes to take baths where she can soak, but she has difficulty getting back out when she is done.  About 2-3 months ago, she was climbing out of the tub when she strained her left groin and has been having pain there ever since.  She also notes vaginal discharge.  She has had yeast infections before.  +Vaginal irritation.  No dysuria, hematuria.  She notes an odor.  UA done 2 weeks ago was unremarkable.    She is on lorazepam and escitalopram for anxiety.  Her sx have been well controlled.  Mood is \"fine.\"   She is also on melatonin for insomnia, which she has taken for many years.  She followed with Dr. Demetrius Romero for sleep therapy in 2015.  He did work with her to significantly decrease the amount of medication she was using.  She was unable to stop the lorazepam altogether, however.  She has tried trazodone and Ambien previously but had intolerable side effects with both.      She is on amlodipine and atenolol for HTN.  Her blood pressure has been well controlled.  Denies chest pain, palpitations, or shortness of breath.     She is following with Dr. Gallardo Ophthalmology for macular degeneration.  Status post corneal transplant.    She is due for DEXA.      Current Outpatient Medications:     amLODIPine (NORVASC) 2.5 MG tablet, Take 1 tablet by mouth Daily., Disp: 90 tablet, Rfl: 1    atenolol (TENORMIN) 25 MG tablet, 1 tablet PO QAM and 1/4 tab PO QHS, Disp: 135 tablet, Rfl: 1    Calcium Carb-Cholecalciferol (CALCIUM 600 + D PO), Take  by mouth Daily., Disp: , Rfl:     cyclobenzaprine (FLEXERIL) 5 MG tablet, Take 1 tablet by mouth At Night As Needed for Muscle Spasms., Disp: 30 tablet, Rfl: 0    escitalopram (LEXAPRO) 10 MG tablet, Take 1/2 (one-half) tablet by mouth once " "daily, Disp: 45 tablet, Rfl: 1    fluorometholone (FML) 0.1 % ophthalmic suspension, Administer 1 drop to both eyes Daily., Disp: , Rfl:     LORazepam (ATIVAN) 1 MG tablet, TAKE 1/2 TO 1 (ONE-HALF TO ONE) TABLET BY MOUTH AT NIGHT AS NEEDED FOR SLEEP, Disp: 30 tablet, Rfl: 2    Melatonin 1 MG capsule, Take 1.5 mg by mouth Every Night., Disp: , Rfl:     Misc Natural Products (CORTISOL MANAGER PO), Take  by mouth Every Night., Disp: , Rfl:     Multiple Vitamins-Minerals (PRESERVISION AREDS PO), Take 1 tablet by mouth Daily., Disp: , Rfl:     pilocarpine (PILOCAR) 1 % ophthalmic solution, , Disp: , Rfl:   There are no discontinued medications.      Allergies:  Patient has no known allergies.      Objective   Vital Signs:   Vitals:    09/15/23 1333 09/15/23 1411   BP: 150/77 165/73   Pulse: 56 54   Temp: 97.8 °F (36.6 °C)    TempSrc: Oral    Weight: 96 kg (211 lb 9.6 oz)    Height: 160 cm (62.99\")      BP Readings from Last 3 Encounters:   09/15/23 165/73   06/14/23 137/80   03/23/23 137/59     Wt Readings from Last 3 Encounters:   09/15/23 96 kg (211 lb 9.6 oz)   06/14/23 94.3 kg (208 lb)   03/23/23 94.3 kg (208 lb)         Physical Exam  Vitals reviewed.   Constitutional:       General: She is not in acute distress.     Appearance: Normal appearance. She is well-developed.   HENT:      Head: Normocephalic and atraumatic.      Right Ear: External ear normal.      Left Ear: External ear normal.   Eyes:      Extraocular Movements: Extraocular movements intact.      Conjunctiva/sclera: Conjunctivae normal.      Pupils: Pupils are equal, round, and reactive to light.   Cardiovascular:      Rate and Rhythm: Normal rate and regular rhythm.      Heart sounds: No murmur heard.  Pulmonary:      Effort: Pulmonary effort is normal.      Breath sounds: Normal breath sounds. No wheezing, rhonchi or rales.   Abdominal:      General: Bowel sounds are normal. There is no distension.      Palpations: Abdomen is soft.      Tenderness: " There is no abdominal tenderness.      Hernia: There is no hernia in the left inguinal area or right inguinal area.   Genitourinary:     General: Normal vulva.      Pubic Area: No rash.       Labia:         Right: No rash, tenderness, lesion or injury.         Left: No rash, tenderness, lesion or injury.       Urethra: No prolapse, urethral pain, urethral swelling or urethral lesion.      Vagina: Normal.      Uterus: Absent.    Musculoskeletal:         General: Normal range of motion.   Neurological:      Mental Status: She is alert.   Psychiatric:         Mood and Affect: Affect normal.         Lab Results   Component Value Date    GLUCOSE 118 (H) 02/28/2023    BUN 25 (H) 02/28/2023    CREATININE 0.69 02/28/2023    EGFRIFNONA 97 01/13/2022    BCR 36.2 (H) 02/28/2023    K 4.2 02/28/2023    CO2 27.5 02/28/2023    CALCIUM 9.7 02/28/2023    ALBUMIN 4.7 02/28/2023    LABIL2 1.3 (L) 03/01/2021    AST 21 02/28/2023    ALT 21 02/28/2023       Lab Results   Component Value Date    CHOL 182 02/28/2023    CHLPL 185 03/01/2021    TRIG 155 (H) 02/28/2023    HDL 48 02/28/2023     (H) 02/28/2023            Assessment and Plan    Diagnoses and all orders for this visit:    1. Generalized anxiety disorder (Primary)  Assessment & Plan:  Stable on current regimen.  Symptoms are well controlled.  No adverse effects. She does require ongoing use of this controlled substance to function.  Tox screen was due today.  Prior tox screen appropriate.  OSWALDO was run today.  Refills were not needed today.  RTC 3 months.        2. Other long term (current) drug therapy  -     POC Urine Drug Screen [facility]    3. Acute vaginitis  Assessment & Plan:  Vaginal screen sample obtained today.  She does have some thin white runny discharge.  No atrophic vaginitis noted no lesions or rash.    Orders:  -     Gardnerella vaginalis, Trichomonas vaginalis, Candida albicans, DNA - Swab, Vagina    4. Left groin pain  Assessment & Plan:  She is  tender to palpation over the left groin.  I suspect muscle strain when she was climbing out of the tub 2 to 3 months ago.  Alternate ice and heat to the area.  Checking x-ray today to rule out anything more serious.    Orders:  -     XR Hip With or Without Pelvis 2 - 3 View Left; Future        Follow Up   Return in about 3 months (around 12/15/2023) for Recheck.  Patient was given instructions and counseling regarding her condition or for health maintenance advice. Please see specific information pulled into the AVS if appropriate.           09/15/2023

## 2023-09-21 LAB — BENZODIAZ CTO UR CFM-MCNC: NEGATIVE NG/ML

## 2023-10-30 ENCOUNTER — OFFICE VISIT (OUTPATIENT)
Dept: FAMILY MEDICINE CLINIC | Age: 78
End: 2023-10-30
Payer: MEDICARE

## 2023-10-30 ENCOUNTER — TELEPHONE (OUTPATIENT)
Dept: FAMILY MEDICINE CLINIC | Age: 78
End: 2023-10-30

## 2023-10-30 ENCOUNTER — HOSPITAL ENCOUNTER (OUTPATIENT)
Dept: GENERAL RADIOLOGY | Facility: HOSPITAL | Age: 78
Discharge: HOME | End: 2023-10-30
Admitting: PHYSICIAN ASSISTANT
Payer: MEDICARE

## 2023-10-30 VITALS
DIASTOLIC BLOOD PRESSURE: 84 MMHG | SYSTOLIC BLOOD PRESSURE: 170 MMHG | TEMPERATURE: 98 F | OXYGEN SATURATION: 97 % | WEIGHT: 215 LBS | BODY MASS INDEX: 38.09 KG/M2 | HEART RATE: 66 BPM | HEIGHT: 63 IN

## 2023-10-30 DIAGNOSIS — M25.571 ACUTE RIGHT ANKLE PAIN: ICD-10-CM

## 2023-10-30 DIAGNOSIS — L03.115 CELLULITIS OF RIGHT LOWER EXTREMITY: Primary | ICD-10-CM

## 2023-10-30 PROCEDURE — 1160F RVW MEDS BY RX/DR IN RCRD: CPT | Performed by: PHYSICIAN ASSISTANT

## 2023-10-30 PROCEDURE — 99213 OFFICE O/P EST LOW 20 MIN: CPT | Performed by: PHYSICIAN ASSISTANT

## 2023-10-30 PROCEDURE — 3079F DIAST BP 80-89 MM HG: CPT | Performed by: PHYSICIAN ASSISTANT

## 2023-10-30 PROCEDURE — 73630 X-RAY EXAM OF FOOT: CPT

## 2023-10-30 PROCEDURE — 1159F MED LIST DOCD IN RCRD: CPT | Performed by: PHYSICIAN ASSISTANT

## 2023-10-30 PROCEDURE — 73610 X-RAY EXAM OF ANKLE: CPT

## 2023-10-30 PROCEDURE — 3077F SYST BP >= 140 MM HG: CPT | Performed by: PHYSICIAN ASSISTANT

## 2023-10-30 RX ORDER — CEPHALEXIN 500 MG/1
500 CAPSULE ORAL 3 TIMES DAILY
Qty: 21 CAPSULE | Refills: 0 | Status: SHIPPED | OUTPATIENT
Start: 2023-10-30 | End: 2023-11-06

## 2023-10-30 NOTE — PROGRESS NOTES
Diagnoses and all orders for this visit:    1. Cellulitis of right lower extremity (Primary)  -     cephalexin (KEFLEX) 500 MG capsule; Take 1 capsule by mouth 3 (Three) Times a Day for 7 days.  Dispense: 21 capsule; Refill: 0    2. Acute right ankle pain  -     XR Ankle 3+ View Right; Future  -     XR Foot 3+ View Right; Future            Subjective     CHIEF COMPLAINT    Chief Complaint   Patient presents with    Ankle Pain     (R) sever swelling. X 10 days.             History of Present Illness  This is a 77-year-old female presenting to the clinic complaining of right ankle pain and swelling for the last 10 days.  She states she fell as she was getting out of her car and twisted her ankle.  She has had persistent swelling, pain, and bruising since that time.  She is especially concerned because she had an injury about 20 years ago resulting in surgery with remaining hardware.  Dr. Dela Cruz did her surgery.  Her pain is constant soreness but is worse with ankle inversion and bearing weight.            Review of Systems   Constitutional:  Negative for chills and fever.   Musculoskeletal:  Positive for arthralgias and joint swelling. Negative for gait problem and myalgias.   Skin:  Negative for wound.   Neurological:  Negative for weakness and numbness.            Past Medical History:   Diagnosis Date    Allergic rhinitis     Anxiety     Asymptomatic menopausal state     Contusion of right knee     Fracture of ankle     History of falling     Hypertension     Keratoconus     s/p 3 cornea transplants    Laryngeal spasm     Low back pain     Mitral valve prolapse     Other long term (current) drug therapy     Pain in right knee     Primary insomnia     Sprain of left rotator cuff capsule             Past Surgical History:   Procedure Laterality Date    BREAST BIOPSY      CHOLECYSTECTOMY      COLON SURGERY      CORNEAL TRANSPLANT      x3    HERNIA REPAIR      HYSTERECTOMY      ORIF ANKLE FRACTURE Right          "    Family History   Problem Relation Age of Onset    Heart disease Mother     Heart attack Mother     Hypertension Mother     Coronary artery disease Mother     Lung disease Father             Social History     Socioeconomic History    Marital status:     Number of children: 1   Tobacco Use    Smoking status: Never    Smokeless tobacco: Never   Vaping Use    Vaping Use: Never used   Substance and Sexual Activity    Alcohol use: No    Drug use: Defer    Sexual activity: Defer            No Known Allergies         Current Outpatient Medications on File Prior to Visit   Medication Sig Dispense Refill    amLODIPine (NORVASC) 2.5 MG tablet Take 1 tablet by mouth Daily. 90 tablet 1    atenolol (TENORMIN) 25 MG tablet 1 tablet PO QAM and 1/4 tab PO  tablet 1    Calcium Carb-Cholecalciferol (CALCIUM 600 + D PO) Take  by mouth Daily.      escitalopram (LEXAPRO) 10 MG tablet Take 1/2 (one-half) tablet by mouth once daily 45 tablet 1    fluorometholone (FML) 0.1 % ophthalmic suspension Administer 1 drop to both eyes Daily.      LORazepam (ATIVAN) 1 MG tablet TAKE 1/2 TO 1 (ONE-HALF TO ONE) TABLET BY MOUTH AT NIGHT AS NEEDED FOR SLEEP 30 tablet 2    Melatonin 1 MG capsule Take 1.5 mg by mouth Every Night.      Misc Natural Products (CORTISOL MANAGER PO) Take  by mouth Every Night.      Multiple Vitamins-Minerals (PRESERVISION AREDS PO) Take 1 tablet by mouth Daily.      pilocarpine (PILOCAR) 1 % ophthalmic solution       cyclobenzaprine (FLEXERIL) 5 MG tablet Take 1 tablet by mouth At Night As Needed for Muscle Spasms. (Patient not taking: Reported on 10/30/2023) 30 tablet 0     No current facility-administered medications on file prior to visit.            /84   Pulse 66   Temp 98 °F (36.7 °C) (Oral)   Ht 160 cm (62.99\")   Wt 97.5 kg (215 lb)   SpO2 97% Comment: room air  BMI 38.10 kg/m²          Objective     Physical Exam  Vitals and nursing note reviewed.   Constitutional:       General: She is " not in acute distress.     Appearance: Normal appearance.   Pulmonary:      Effort: Pulmonary effort is normal. No respiratory distress.   Musculoskeletal:      Right ankle: Swelling and ecchymosis present. No deformity. Tenderness present over the lateral malleolus. Normal range of motion. Normal pulse.      Right foot: Normal range of motion. Swelling present. No deformity or tenderness. Normal pulse.   Skin:     General: Skin is warm and dry.      Findings: Bruising and erythema (surrounding scar - see image below) present.   Neurological:      Mental Status: She is alert and oriented to person, place, and time.   Psychiatric:         Mood and Affect: Mood normal.         Behavior: Behavior normal.                       Lab Results (last 24 hours)       ** No results found for the last 24 hours. **                  XR Foot 3+ View Right    Result Date: 10/30/2023  PROCEDURE: XR FOOT 3+ VW RIGHT  COMPARISON: None  INDICATIONS: LATERAL RIGHT FOOT PAIN AFTER TWIST X 10 DAYS  FINDINGS:  No acute fracture or malalignment is seen.  Orthopedic screws are noted in the medial malleolus and distal tibial metaphysis.  Diffuse soft tissue prominence is noted.        1. Diffuse soft tissue prominence possibly related to large body habitus and or soft tissue edema 2. No acute fracture or malalignment      Maxime Torres M.D.       Electronically Signed and Approved By: Maxime Torres M.D. on 10/30/2023 at 16:15             XR Ankle 3+ View Right    Result Date: 10/30/2023  PROCEDURE: XR ANKLE 3+ VW RIGHT  COMPARISON: None  INDICATIONS: GENERAL RIGHT ANKLE PAIN AFTER TWIST X 10 DAYS  FINDINGS:  There is a screw fragment in the distal tibial metaphysis.  There are 2 cannulated screws positioned across the medial malleolus.  There is a 0.5 cm os ossific density inferior to the medial malleolus which appears chronic, likely an old fracture fragment.  No acute fracture or malalignment is seen.  There is marked diffuse soft tissue  prominence.  A small tibiotalar joint effusion is noted.        1. No acute fracture or malalignment 2. Previous open reduction internal fixation of a medial malleolar fracture 3. Chronic fracture fragment inferior to the medial malleolus 4. Diffuse soft tissue prominence which may be secondary to soft tissue swelling and or large body habitus.      Maxime Torres M.D.       Electronically Signed and Approved By: Maxime Torres M.D. on 10/30/2023 at 16:14                              Diagnoses and all orders for this visit:    1. Cellulitis of right lower extremity (Primary)  -     cephalexin (KEFLEX) 500 MG capsule; Take 1 capsule by mouth 3 (Three) Times a Day for 7 days.  Dispense: 21 capsule; Refill: 0    2. Acute right ankle pain  -     XR Ankle 3+ View Right; Future  -     XR Foot 3+ View Right; Future                           FOR FULL DISCHARGE INSTRUCTIONS/COMMENTS/HANDOUTS please see the   AVS

## 2023-10-30 NOTE — TELEPHONE ENCOUNTER
Caller: Yanni Huynh    Relationship to patient: Self    Best call back number: [phone]    Chief complaint: PAIN, SWELLING, AND A VERTICAL LINE IN HER ANKLE     Type of visit: OFFICE     Requested date: ASAP      If rescheduling, when is the original appointment: N/A     Additional notes:       THE PATIENT SAID SHE HAS AN INJURY TO HER ANKLE ABOUT 10 DAYS AGO. SHE SAID SHE HAD SOME BRUISING AND SWELLING AND IT HAS GONE AWAY SOME. SHE  SAID SHE   STILL HAS SOME SOME PAIN AND BRUISING BUT HER CONCERN IS A STRANGE LINE THAT HAS COME UP ON HER ANKLE. SHE SAID SHE HAS A PIN THAT WAS LEFT IN HER ANKLE FROM A PREVIOUS SURGERY AND IS CONCERNED THAT IT MAY HAVE MOVED AROUND.   THE PATIENT PREFERS TO SEE PCP [facility] AND [facility] WAS NOT ABLE TO FIND ANYTHING FOR THE REST OF THE YEAR    SHE WOULD LIKE A CALL BACK TO DISCUSS

## 2023-11-30 ENCOUNTER — OFFICE VISIT (OUTPATIENT)
Dept: FAMILY MEDICINE CLINIC | Age: 78
End: 2023-11-30
Payer: MEDICARE

## 2023-11-30 VITALS
HEIGHT: 63 IN | BODY MASS INDEX: 37.78 KG/M2 | SYSTOLIC BLOOD PRESSURE: 156 MMHG | DIASTOLIC BLOOD PRESSURE: 82 MMHG | HEART RATE: 56 BPM | WEIGHT: 213.2 LBS | OXYGEN SATURATION: 98 % | TEMPERATURE: 98.2 F

## 2023-11-30 DIAGNOSIS — N76.0 BV (BACTERIAL VAGINOSIS): Primary | ICD-10-CM

## 2023-11-30 DIAGNOSIS — B96.89 BV (BACTERIAL VAGINOSIS): Primary | ICD-10-CM

## 2023-11-30 DIAGNOSIS — R30.0 DYSURIA: ICD-10-CM

## 2023-11-30 DIAGNOSIS — N89.8 VAGINAL DISCHARGE: ICD-10-CM

## 2023-11-30 LAB
BACTERIA UR QL AUTO: ABNORMAL /HPF
BILIRUB UR QL STRIP: NEGATIVE
CANDIDA SPECIES: NEGATIVE
CLARITY UR: CLEAR
COLOR UR: YELLOW
GARDNERELLA VAGINALIS: POSITIVE
GLUCOSE UR STRIP-MCNC: NEGATIVE MG/DL
HGB UR QL STRIP.AUTO: NEGATIVE
KETONES UR QL STRIP: NEGATIVE
LEUKOCYTE ESTERASE UR QL STRIP.AUTO: ABNORMAL
NITRITE UR QL STRIP: NEGATIVE
PH UR STRIP.AUTO: 5.5 [PH] (ref 5–8)
PROT UR QL STRIP: NEGATIVE
RBC # UR STRIP: ABNORMAL /HPF
REF LAB TEST METHOD: ABNORMAL
SP GR UR STRIP: 1.02 (ref 1–1.03)
SQUAMOUS #/AREA URNS HPF: ABNORMAL /HPF
T VAGINALIS DNA VAG QL PROBE+SIG AMP: NEGATIVE
UROBILINOGEN UR QL STRIP: ABNORMAL
WBC # UR STRIP: ABNORMAL /HPF

## 2023-11-30 PROCEDURE — 1159F MED LIST DOCD IN RCRD: CPT | Performed by: PHYSICIAN ASSISTANT

## 2023-11-30 PROCEDURE — 87510 GARDNER VAG DNA DIR PROBE: CPT | Performed by: PHYSICIAN ASSISTANT

## 2023-11-30 PROCEDURE — 87480 CANDIDA DNA DIR PROBE: CPT | Performed by: PHYSICIAN ASSISTANT

## 2023-11-30 PROCEDURE — 1160F RVW MEDS BY RX/DR IN RCRD: CPT | Performed by: PHYSICIAN ASSISTANT

## 2023-11-30 PROCEDURE — 87660 TRICHOMONAS VAGIN DIR PROBE: CPT | Performed by: PHYSICIAN ASSISTANT

## 2023-11-30 PROCEDURE — 99213 OFFICE O/P EST LOW 20 MIN: CPT | Performed by: PHYSICIAN ASSISTANT

## 2023-11-30 PROCEDURE — 81001 URINALYSIS AUTO W/SCOPE: CPT | Performed by: PHYSICIAN ASSISTANT

## 2023-11-30 PROCEDURE — 3079F DIAST BP 80-89 MM HG: CPT | Performed by: PHYSICIAN ASSISTANT

## 2023-11-30 PROCEDURE — 3077F SYST BP >= 140 MM HG: CPT | Performed by: PHYSICIAN ASSISTANT

## 2023-11-30 NOTE — PROGRESS NOTES
Addendum 12/1/2023: Vaginal swab was positive for bacterial vaginosis.  Prescription for Flagyl sent to patient's pharmacy.    Diagnoses and all orders for this visit:    1. BV (bacterial vaginosis) (Primary)  -     metroNIDAZOLE (Flagyl) 500 MG tablet; Take 1 tablet by mouth 2 (Two) Times a Day for 7 days.  Dispense: 14 tablet; Refill: 0    2. Vaginal discharge  -     Gardnerella vaginalis, Trichomonas vaginalis, Candida albicans, DNA - Swab, Vagina; Future  -     Gardnerella vaginalis, Trichomonas vaginalis, Candida albicans, DNA - Swab, Vagina    3. Dysuria  -     Urinalysis With Culture If Indicated -; Future  -     Urinalysis With Culture If Indicated - Urine, Clean Catch  -     Urinalysis, Microscopic Only - Urine, Clean Catch            Subjective     CHIEF COMPLAINT    Chief Complaint   Patient presents with    Vaginal Discharge     With itching and burning. X 2 weeks. Pt denies urinary frequency.             History of Present Illness  This is a 77-year-old female presenting the clinic with concern of vaginal itching, burning, and discharge for the last couple of days.  She reports a history of bacterial vaginosis and states this feels similar to when she had that.  She has not tried any over-the-counter treatments.  She also is continuing to have pain in her left groin area, especially with movement such as getting out of the bathtub or scooting to the end of the exam table today.  She states she has talking to Dr. Melgar about this previously and they both felt like it was a pulled muscle.            Review of Systems   Constitutional:  Negative for chills and fever.   Gastrointestinal:  Negative for abdominal pain, nausea and vomiting.   Genitourinary:  Positive for dysuria and vaginal discharge. Negative for flank pain, frequency, hematuria, urgency and vaginal bleeding.        Left groin pain   Musculoskeletal:  Negative for back pain.            Past Medical History:   Diagnosis Date    Allergic  rhinitis     Anxiety     Asymptomatic menopausal state     Contusion of right knee     Fracture of ankle     History of falling     Hypertension     Keratoconus     s/p 3 cornea transplants    Laryngeal spasm     Low back pain     Mitral valve prolapse     Other long term (current) drug therapy     Pain in right knee     Primary insomnia     Sprain of left rotator cuff capsule             Past Surgical History:   Procedure Laterality Date    BREAST BIOPSY      CHOLECYSTECTOMY      COLON SURGERY      CORNEAL TRANSPLANT      x3    HERNIA REPAIR      HYSTERECTOMY      ORIF ANKLE FRACTURE Right             Family History   Problem Relation Age of Onset    Heart disease Mother     Heart attack Mother     Hypertension Mother     Coronary artery disease Mother     Lung disease Father             Social History     Socioeconomic History    Marital status:     Number of children: 1   Tobacco Use    Smoking status: Never    Smokeless tobacco: Never   Vaping Use    Vaping Use: Never used   Substance and Sexual Activity    Alcohol use: No    Drug use: Defer    Sexual activity: Defer            No Known Allergies         Current Outpatient Medications on File Prior to Visit   Medication Sig Dispense Refill    amLODIPine (NORVASC) 2.5 MG tablet Take 1 tablet by mouth Daily. 90 tablet 1    atenolol (TENORMIN) 25 MG tablet 1 tablet PO QAM and 1/4 tab PO  tablet 1    Calcium Carb-Cholecalciferol (CALCIUM 600 + D PO) Take  by mouth As Needed.      cyclobenzaprine (FLEXERIL) 5 MG tablet Take 1 tablet by mouth At Night As Needed for Muscle Spasms. 30 tablet 0    escitalopram (LEXAPRO) 10 MG tablet Take 1/2 (one-half) tablet by mouth once daily 45 tablet 1    fluorometholone (FML) 0.1 % ophthalmic suspension Administer 1 drop to both eyes Daily.      LORazepam (ATIVAN) 1 MG tablet TAKE 1/2 TO 1 (ONE-HALF TO ONE) TABLET BY MOUTH AT NIGHT AS NEEDED FOR SLEEP 30 tablet 2    Melatonin 1 MG capsule Take 1.5 mg by mouth Every  "Night.      Misc Natural Products (CORTISOL MANAGER PO) Take  by mouth Every Night.      Multiple Vitamins-Minerals (PRESERVISION AREDS PO) Take 1 tablet by mouth Daily.      pilocarpine (PILOCAR) 1 % ophthalmic solution        No current facility-administered medications on file prior to visit.            /82   Pulse 56   Temp 98.2 °F (36.8 °C) (Oral)   Ht 160 cm (62.99\")   Wt 96.7 kg (213 lb 3.2 oz)   SpO2 98% Comment: room air  BMI 37.78 kg/m²          Objective     Physical Exam  Vitals and nursing note reviewed. Exam conducted with a chaperone present (Alla TO RN).   Constitutional:       General: She is not in acute distress.     Appearance: Normal appearance.   Pulmonary:      Effort: Pulmonary effort is normal. No respiratory distress.   Genitourinary:     General: Normal vulva.      Exam position: Lithotomy position.       Skin:     General: Skin is warm and dry.   Neurological:      Mental Status: She is alert and oriented to person, place, and time.   Psychiatric:         Mood and Affect: Mood normal.         Behavior: Behavior normal.              Procedures                    Lab Results (last 24 hours)       Procedure Component Value Units Date/Time    Gardnerella vaginalis, Trichomonas vaginalis, Candida albicans, DNA - Swab, Vagina [[id number]]  (Abnormal) Collected: 11/30/23 1233    Specimen: Swab from Vagina Updated: 11/30/23 2135     GARDNERELLA VAGINALIS Positive     TRICHOMONAS VAGINALIS Negative     CANDIDA SPECIES Negative    Urinalysis With Culture If Indicated - Urine, Clean Catch [[id number]]  (Abnormal) Collected: 11/30/23 1305    Specimen: Urine, Clean Catch Updated: 11/30/23 1316     Color, UA Yellow     Appearance, UA Clear     pH, UA 5.5     Specific Gravity, UA 1.025     Glucose, UA Negative     Ketones, UA Negative     Bilirubin, UA Negative     Blood, UA Negative     Protein, UA Negative     Leuk Esterase, UA Trace     Nitrite, UA Negative     Urobilinogen, UA 0.2 " E.U./dL    Narrative:      In absence of clinical symptoms, the presence of pyuria, bacteria, and/or nitrites on the urinalysis result does not correlate with infection.    Urinalysis, Microscopic Only - Urine, Clean Catch [[id number]]  (Abnormal) Collected: 11/30/23 1305    Specimen: Urine, Clean Catch Updated: 11/30/23 1327     RBC, UA None Seen /HPF      WBC, UA 0-2 /HPF      Bacteria, UA Trace /HPF      Squamous Epithelial Cells, UA 0-2 /HPF      Methodology Manual Light Microscopy                  No Radiology Exams Resulted Within Past 24 Hours                    Diagnoses and all orders for this visit:    1. BV (bacterial vaginosis) (Primary)  -     metroNIDAZOLE (Flagyl) 500 MG tablet; Take 1 tablet by mouth 2 (Two) Times a Day for 7 days.  Dispense: 14 tablet; Refill: 0    2. Vaginal discharge  -     Gardnerella vaginalis, Trichomonas vaginalis, Candida albicans, DNA - Swab, Vagina; Future  -     Gardnerella vaginalis, Trichomonas vaginalis, Candida albicans, DNA - Swab, Vagina    3. Dysuria  -     Urinalysis With Culture If Indicated -; Future  -     Urinalysis With Culture If Indicated - Urine, Clean Catch  -     Urinalysis, Microscopic Only - Urine, Clean Catch                             FOR FULL DISCHARGE INSTRUCTIONS/COMMENTS/HANDOUTS please see the   AVS

## 2023-12-01 RX ORDER — METRONIDAZOLE 500 MG/1
500 TABLET ORAL 2 TIMES DAILY
Qty: 14 TABLET | Refills: 0 | Status: SHIPPED | OUTPATIENT
Start: 2023-12-01 | End: 2023-12-08

## 2023-12-19 DIAGNOSIS — I10 ESSENTIAL (PRIMARY) HYPERTENSION: ICD-10-CM

## 2023-12-19 RX ORDER — ATENOLOL 25 MG/1
TABLET ORAL
Qty: 135 TABLET | Refills: 1 | Status: SHIPPED | OUTPATIENT
Start: 2023-12-19

## 2023-12-28 DIAGNOSIS — I10 ESSENTIAL (PRIMARY) HYPERTENSION: ICD-10-CM

## 2023-12-28 RX ORDER — AMLODIPINE BESYLATE 2.5 MG/1
2.5 TABLET ORAL DAILY
Qty: 90 TABLET | Refills: 0 | Status: SHIPPED | OUTPATIENT
Start: 2023-12-28

## 2024-01-05 ENCOUNTER — OFFICE VISIT (OUTPATIENT)
Dept: FAMILY MEDICINE CLINIC | Age: 79
End: 2024-01-05
Payer: MEDICARE

## 2024-01-05 ENCOUNTER — LAB (OUTPATIENT)
Dept: LAB | Facility: HOSPITAL | Age: 79
End: 2024-01-05
Payer: MEDICARE

## 2024-01-05 VITALS
WEIGHT: 209.8 LBS | HEART RATE: 60 BPM | SYSTOLIC BLOOD PRESSURE: 123 MMHG | HEIGHT: 63 IN | DIASTOLIC BLOOD PRESSURE: 81 MMHG | TEMPERATURE: 97.3 F | BODY MASS INDEX: 37.17 KG/M2 | OXYGEN SATURATION: 97 %

## 2024-01-05 DIAGNOSIS — N76.0 BACTERIAL VAGINOSIS: ICD-10-CM

## 2024-01-05 DIAGNOSIS — I10 ESSENTIAL (PRIMARY) HYPERTENSION: ICD-10-CM

## 2024-01-05 DIAGNOSIS — B96.89 BACTERIAL VAGINOSIS: ICD-10-CM

## 2024-01-05 DIAGNOSIS — F41.1 GENERALIZED ANXIETY DISORDER: Primary | ICD-10-CM

## 2024-01-05 DIAGNOSIS — R05.1 ACUTE COUGH: ICD-10-CM

## 2024-01-05 DIAGNOSIS — Z79.899 OTHER LONG TERM (CURRENT) DRUG THERAPY: ICD-10-CM

## 2024-01-05 LAB
ALBUMIN SERPL-MCNC: 4.5 G/DL (ref 3.5–5.2)
ALBUMIN/GLOB SERPL: 1.5 G/DL
ALP SERPL-CCNC: 98 U/L (ref 39–117)
ALT SERPL W P-5'-P-CCNC: 23 U/L (ref 1–33)
AMPHET+METHAMPHET UR QL: NEGATIVE
AMPHETAMINES UR QL: NEGATIVE
ANION GAP SERPL CALCULATED.3IONS-SCNC: 11.4 MMOL/L (ref 5–15)
AST SERPL-CCNC: 19 U/L (ref 1–32)
BARBITURATES UR QL SCN: NEGATIVE
BASOPHILS # BLD AUTO: 0.05 10*3/MM3 (ref 0–0.2)
BASOPHILS NFR BLD AUTO: 0.6 % (ref 0–1.5)
BENZODIAZ UR QL SCN: POSITIVE
BILIRUB SERPL-MCNC: 0.7 MG/DL (ref 0–1.2)
BUN SERPL-MCNC: 19 MG/DL (ref 8–23)
BUN/CREAT SERPL: 29.2 (ref 7–25)
BUPRENORPHINE SERPL-MCNC: NEGATIVE NG/ML
CALCIUM SPEC-SCNC: 9.3 MG/DL (ref 8.6–10.5)
CANDIDA SPECIES: NEGATIVE
CANNABINOIDS SERPL QL: NEGATIVE
CHLORIDE SERPL-SCNC: 101 MMOL/L (ref 98–107)
CHOLEST SERPL-MCNC: 178 MG/DL (ref 0–200)
CO2 SERPL-SCNC: 26.6 MMOL/L (ref 22–29)
COCAINE UR QL: NEGATIVE
CREAT SERPL-MCNC: 0.65 MG/DL (ref 0.57–1)
DEPRECATED RDW RBC AUTO: 38.7 FL (ref 37–54)
EGFRCR SERPLBLD CKD-EPI 2021: 89.1 ML/MIN/1.73
EOSINOPHIL # BLD AUTO: 0.34 10*3/MM3 (ref 0–0.4)
EOSINOPHIL NFR BLD AUTO: 4 % (ref 0.3–6.2)
ERYTHROCYTE [DISTWIDTH] IN BLOOD BY AUTOMATED COUNT: 12.7 % (ref 12.3–15.4)
EXPIRATION DATE: ABNORMAL
EXPIRATION DATE: NORMAL
FLUAV AG UPPER RESP QL IA.RAPID: NOT DETECTED
FLUBV AG UPPER RESP QL IA.RAPID: NOT DETECTED
GARDNERELLA VAGINALIS: NEGATIVE
GLOBULIN UR ELPH-MCNC: 3 GM/DL
GLUCOSE SERPL-MCNC: 130 MG/DL (ref 65–99)
HCT VFR BLD AUTO: 41.2 % (ref 34–46.6)
HDLC SERPL-MCNC: 42 MG/DL (ref 40–60)
HGB BLD-MCNC: 14.1 G/DL (ref 12–15.9)
IMM GRANULOCYTES # BLD AUTO: 0.03 10*3/MM3 (ref 0–0.05)
IMM GRANULOCYTES NFR BLD AUTO: 0.4 % (ref 0–0.5)
INTERNAL CONTROL: NORMAL
LDLC SERPL CALC-MCNC: 105 MG/DL (ref 0–100)
LDLC/HDLC SERPL: 2.4 {RATIO}
LYMPHOCYTES # BLD AUTO: 1.84 10*3/MM3 (ref 0.7–3.1)
LYMPHOCYTES NFR BLD AUTO: 21.8 % (ref 19.6–45.3)
Lab: ABNORMAL
Lab: NORMAL
MCH RBC QN AUTO: 28.6 PG (ref 26.6–33)
MCHC RBC AUTO-ENTMCNC: 34.2 G/DL (ref 31.5–35.7)
MCV RBC AUTO: 83.6 FL (ref 79–97)
MDMA UR QL SCN: NEGATIVE
METHADONE UR QL SCN: NEGATIVE
MONOCYTES # BLD AUTO: 0.78 10*3/MM3 (ref 0.1–0.9)
MONOCYTES NFR BLD AUTO: 9.3 % (ref 5–12)
MORPHINE/OPIATES SCREEN, URINE: NEGATIVE
NEUTROPHILS NFR BLD AUTO: 5.39 10*3/MM3 (ref 1.7–7)
NEUTROPHILS NFR BLD AUTO: 63.9 % (ref 42.7–76)
OXYCODONE UR QL SCN: NEGATIVE
PCP UR QL SCN: NEGATIVE
PLATELET # BLD AUTO: 231 10*3/MM3 (ref 140–450)
PMV BLD AUTO: 9.7 FL (ref 6–12)
POTASSIUM SERPL-SCNC: 4.4 MMOL/L (ref 3.5–5.2)
PROT SERPL-MCNC: 7.5 G/DL (ref 6–8.5)
RBC # BLD AUTO: 4.93 10*6/MM3 (ref 3.77–5.28)
SARS-COV-2 AG UPPER RESP QL IA.RAPID: NOT DETECTED
SODIUM SERPL-SCNC: 139 MMOL/L (ref 136–145)
T VAGINALIS DNA VAG QL PROBE+SIG AMP: NEGATIVE
TRIGL SERPL-MCNC: 175 MG/DL (ref 0–150)
VLDLC SERPL-MCNC: 31 MG/DL (ref 5–40)
WBC NRBC COR # BLD AUTO: 8.43 10*3/MM3 (ref 3.4–10.8)

## 2024-01-05 PROCEDURE — 85025 COMPLETE CBC W/AUTO DIFF WBC: CPT

## 2024-01-05 PROCEDURE — 80053 COMPREHEN METABOLIC PANEL: CPT

## 2024-01-05 PROCEDURE — 1160F RVW MEDS BY RX/DR IN RCRD: CPT | Performed by: FAMILY MEDICINE

## 2024-01-05 PROCEDURE — 87480 CANDIDA DNA DIR PROBE: CPT | Performed by: FAMILY MEDICINE

## 2024-01-05 PROCEDURE — 87510 GARDNER VAG DNA DIR PROBE: CPT | Performed by: FAMILY MEDICINE

## 2024-01-05 PROCEDURE — 3079F DIAST BP 80-89 MM HG: CPT | Performed by: FAMILY MEDICINE

## 2024-01-05 PROCEDURE — 87660 TRICHOMONAS VAGIN DIR PROBE: CPT | Performed by: FAMILY MEDICINE

## 2024-01-05 PROCEDURE — 36415 COLL VENOUS BLD VENIPUNCTURE: CPT

## 2024-01-05 PROCEDURE — 3074F SYST BP LT 130 MM HG: CPT | Performed by: FAMILY MEDICINE

## 2024-01-05 PROCEDURE — 80061 LIPID PANEL: CPT

## 2024-01-05 PROCEDURE — 80305 DRUG TEST PRSMV DIR OPT OBS: CPT | Performed by: FAMILY MEDICINE

## 2024-01-05 PROCEDURE — 1159F MED LIST DOCD IN RCRD: CPT | Performed by: FAMILY MEDICINE

## 2024-01-05 PROCEDURE — 87428 SARSCOV & INF VIR A&B AG IA: CPT | Performed by: FAMILY MEDICINE

## 2024-01-05 PROCEDURE — 99214 OFFICE O/P EST MOD 30 MIN: CPT | Performed by: FAMILY MEDICINE

## 2024-01-05 RX ORDER — AMLODIPINE BESYLATE 2.5 MG/1
2.5 TABLET ORAL DAILY
Qty: 90 TABLET | Refills: 0 | Status: SHIPPED | OUTPATIENT
Start: 2024-01-05

## 2024-01-05 RX ORDER — AZELASTINE 1 MG/ML
2 SPRAY, METERED NASAL DAILY
Qty: 30 ML | Refills: 0 | Status: SHIPPED | OUTPATIENT
Start: 2024-01-05

## 2024-01-05 RX ORDER — DEXTROMETHORPHAN HYDROBROMIDE AND PROMETHAZINE HYDROCHLORIDE 15; 6.25 MG/5ML; MG/5ML
5 SYRUP ORAL NIGHTLY PRN
Qty: 118 ML | Refills: 0 | Status: SHIPPED | OUTPATIENT
Start: 2024-01-05

## 2024-01-05 RX ORDER — ESCITALOPRAM OXALATE 10 MG/1
5 TABLET ORAL DAILY
Qty: 45 TABLET | Refills: 1 | Status: SHIPPED | OUTPATIENT
Start: 2024-01-05

## 2024-01-05 RX ORDER — GUAIFENESIN 600 MG/1
1200 TABLET, EXTENDED RELEASE ORAL 2 TIMES DAILY PRN
Qty: 60 TABLET | Refills: 0 | Status: SHIPPED | OUTPATIENT
Start: 2024-01-05

## 2024-01-05 NOTE — PROGRESS NOTES
"Chief Complaint  Anxiety (3 month f/u) and Cough (Sinus drainage, congestion x 2 days )    Subjective          Yanni Huynh presents to [facility] today for follow-up on chronic issues.    She has acute complaint of cough for the past 2 days.  She reports positive fatigue and malaise, negative fevers, positive chills, negative headaches, positive rhinorrhea, positive congestion, positive sore throat, negative changes in taste or smell.  She reports positive cough, productive of no sputum, negative chest pain, positive shortness of breath.  She has negative abdominal pain, negative nausea, negative vomiting, negative diarrhea, negative body aches.  Sick contacts:  friend at  dxed with COVID 3 days after Yanni saw her.  She has not had flu or COVID vaccine this year.    At last visit, she was complaining of some vaginal and left groin pain.  She was treated for BV with a course of Flagyl after a vaginal screen came back positive for Gardnerella vaginalis.  She was also diagnosed with left groin strain as she had recently slipped a bit getting out of her tub.  Today, sx are resolved except for some lingering stiffness in the L groin but she had to come in for a repeat test with Ruba at the end of November.  She would like to get tested again to make sure she has cleared the infection.    She is on lorazepam and escitalopram for anxiety.  Symptoms have been well controlled.  Mood is \"fine.\"   She is on melatonin for insomnia.  She previously followed with Dr. Demetrius Romero for sleep therapy in 2015.  He did work with her to significantly decrease the amount of lorazepam she was using but she was unable to go lower than the 1 mg bedtime dose.  She has tried trazodone and Ambien previously but had intolerable side effects with both.      She is on atenolol and amlodipine for hypertension.  Her BP has been well controlled.  Denies chest pain, palpitations, or shortness of " breath.       Follows with Dr. [facility] for macular degeneration.  Status post corneal transplant.      Current Outpatient Medications:     amLODIPine (NORVASC) 2.5 MG tablet, Take 1 tablet by mouth Daily., Disp: 90 tablet, Rfl: 0    atenolol (TENORMIN) 25 MG tablet, 1 tablet PO QAM and 1/4 tab PO QHS, Disp: 135 tablet, Rfl: 1    Calcium Carb-Cholecalciferol (CALCIUM 600 + D PO), Take  by mouth As Needed., Disp: , Rfl:     cyclobenzaprine (FLEXERIL) 5 MG tablet, Take 1 tablet by mouth At Night As Needed for Muscle Spasms., Disp: 30 tablet, Rfl: 0    escitalopram (LEXAPRO) 10 MG tablet, Take 0.5 tablets by mouth Daily., Disp: 45 tablet, Rfl: 1    fluorometholone (FML) 0.1 % ophthalmic suspension, Administer 1 drop to both eyes Daily., Disp: , Rfl:     LORazepam (ATIVAN) 1 MG tablet, TAKE 1/2 TO 1 (ONE-HALF TO ONE) TABLET BY MOUTH AT NIGHT AS NEEDED FOR SLEEP, Disp: 30 tablet, Rfl: 2    Melatonin 1 MG capsule, Take 1.5 mg by mouth Every Night., Disp: , Rfl:     Misc Natural Products (CORTISOL MANAGER PO), Take  by mouth Every Night., Disp: , Rfl:     Multiple Vitamins-Minerals (PRESERVISION AREDS PO), Take 1 tablet by mouth Daily., Disp: , Rfl:     pilocarpine (PILOCAR) 1 % ophthalmic solution, , Disp: , Rfl:     azelastine (ASTELIN) 0.1 % nasal spray, 2 sprays into the nostril(s) as directed by provider Daily. Use in each nostril as directed, Disp: 30 mL, Rfl: 0    guaiFENesin (Mucinex) 600 MG 12 hr tablet, Take 2 tablets by mouth 2 (Two) Times a Day As Needed for Cough or Congestion., Disp: 60 tablet, Rfl: 0    promethazine-dextromethorphan (PROMETHAZINE-DM) 6.25-15 MG/5ML syrup, Take 5 mL by mouth At Night As Needed for Cough., Disp: 118 mL, Rfl: 0  Medications Discontinued During This Encounter   Medication Reason    escitalopram (LEXAPRO) 10 MG tablet Reorder    amLODIPine (NORVASC) 2.5 MG tablet Reorder         Allergies:  Patient has no known allergies.      Objective   Vital Signs:   Vitals:     "01/05/24 1002 01/05/24 1039   BP: 146/85 123/81   BP Location: Left arm Left arm   Patient Position: Sitting Sitting   Cuff Size: Large Adult    Pulse: 57 60   Temp: 97.3 °F (36.3 °C)    TempSrc: Oral    SpO2: 97%    Weight: 95.2 kg (209 lb 12.8 oz)    Height: 160 cm (62.99\")        BP Readings from Last 3 Encounters:   01/05/24 123/81   11/30/23 156/82   10/30/23 170/84     Wt Readings from Last 3 Encounters:   01/05/24 95.2 kg (209 lb 12.8 oz)   11/30/23 96.7 kg (213 lb 3.2 oz)   10/30/23 97.5 kg (215 lb)         Physical Exam  Vitals reviewed.   Constitutional:       General: She is not in acute distress.     Appearance: Normal appearance. She is well-developed.   HENT:      Head: Normocephalic and atraumatic.      Right Ear: External ear normal.      Left Ear: External ear normal.   Eyes:      Extraocular Movements: Extraocular movements intact.      Conjunctiva/sclera: Conjunctivae normal.      Pupils: Pupils are equal, round, and reactive to light.   Cardiovascular:      Rate and Rhythm: Normal rate and regular rhythm.      Heart sounds: No murmur heard.  Pulmonary:      Effort: Pulmonary effort is normal.      Breath sounds: Normal breath sounds. No wheezing, rhonchi or rales.   Abdominal:      General: Bowel sounds are normal. There is no distension.      Palpations: Abdomen is soft.      Tenderness: There is no abdominal tenderness.      Hernia: There is no hernia in the left inguinal area or right inguinal area.   Genitourinary:     General: Normal vulva.      Pubic Area: No rash.       Labia:         Right: No rash, tenderness, lesion or injury.         Left: No rash, tenderness, lesion or injury.       Urethra: No prolapse, urethral pain, urethral swelling or urethral lesion.      Vagina: Normal.      Uterus: Absent.    Musculoskeletal:         General: Normal range of motion.   Neurological:      Mental Status: She is alert.   Psychiatric:         Mood and Affect: Affect normal.           Lab Results "   Component Value Date    GLUCOSE 118 (H) 02/28/2023    BUN 25 (H) 02/28/2023    CREATININE 0.69 02/28/2023    EGFRIFNONA 97 01/13/2022    BCR 36.2 (H) 02/28/2023    K 4.2 02/28/2023    CO2 27.5 02/28/2023    CALCIUM 9.7 02/28/2023    ALBUMIN 4.7 02/28/2023    LABIL2 1.3 (L) 03/01/2021    AST 21 02/28/2023    ALT 21 02/28/2023       Lab Results   Component Value Date    CHOL 182 02/28/2023    CHLPL 185 03/01/2021    TRIG 155 (H) 02/28/2023    HDL 48 02/28/2023     (H) 02/28/2023     Lab Results   Component Value Date    WBC 8.37 02/28/2023    HGB 13.7 02/28/2023    HCT 39.9 02/28/2023    MCV 85.1 02/28/2023     02/28/2023            Assessment and Plan    Diagnoses and all orders for this visit:    1. Generalized anxiety disorder (Primary)  Assessment & Plan:  Stable on current regimen.  Symptoms are well controlled.  No adverse effects. She does require ongoing use of this controlled substance to function.  Tox screen was due today.  Prior tox screen appropriate.  OSWALDO was run today.  Refills were needed today.  RTC 3 months.      Orders:  -     escitalopram (LEXAPRO) 10 MG tablet; Take 0.5 tablets by mouth Daily.  Dispense: 45 tablet; Refill: 1    2. Other long term (current) drug therapy  -     POC Medline 12 Panel Urine Drug Screen    3. Essential (primary) hypertension  Assessment & Plan:  Blood pressure has been running at goal.  Continue atenolol 25 mg daily and amlodipine 2.5 mg daily.  Refills were needed today.  Labs were needed today.  Continue to monitor.      Orders:  -     Comprehensive Metabolic Panel; Future  -     Lipid Panel; Future  -     CBC Auto Differential; Future  -     amLODIPine (NORVASC) 2.5 MG tablet; Take 1 tablet by mouth Daily.  Dispense: 90 tablet; Refill: 0    4. Acute cough  Assessment & Plan:  Viral URI.  Rapid COVID and flu testing were negative today.  No evidence of bacterial infection.  Symptomatic care recommended with OTC analgesics for pain/fever, OTC  decongestants and cough suppressants prn.  Stay well hydrated.  Humidifier in the bedroom at night.  Hot tea with lemon and honey.  RTC prn worsening or failure to improve of sx.    Orders:  -     POCT SARS-CoV-2 + Flu Antigen WHITNEY  -     azelastine (ASTELIN) 0.1 % nasal spray; 2 sprays into the nostril(s) as directed by provider Daily. Use in each nostril as directed  Dispense: 30 mL; Refill: 0  -     guaiFENesin (Mucinex) 600 MG 12 hr tablet; Take 2 tablets by mouth 2 (Two) Times a Day As Needed for Cough or Congestion.  Dispense: 60 tablet; Refill: 0  -     promethazine-dextromethorphan (PROMETHAZINE-DM) 6.25-15 MG/5ML syrup; Take 5 mL by mouth At Night As Needed for Cough.  Dispense: 118 mL; Refill: 0    5. Bacterial vaginosis  Assessment & Plan:  Sx have resolved followed 2 courses of Flagyl PO but she is extremely worried and wishes to be tested again for eradication.  Vag screen performed today.    Orders:  -     Gardnerella vaginalis, Trichomonas vaginalis, Candida albicans, DNA - Swab, Vagina        Follow Up   Return in about 3 months (around 4/5/2024) for Recheck.  Patient was given instructions and counseling regarding her condition or for health maintenance advice. Please see specific information pulled into the AVS if appropriate.           09/15/2023

## 2024-01-05 NOTE — ASSESSMENT & PLAN NOTE
Stable on current regimen.  Symptoms are well controlled.  No adverse effects. She does require ongoing use of this controlled substance to function.  Tox screen was due today.  Prior tox screen appropriate.  OSWALDO was run today.  Refills were needed today.  RTC 3 months.

## 2024-01-05 NOTE — ASSESSMENT & PLAN NOTE
Blood pressure has been running at goal.  Continue atenolol 25 mg daily and amlodipine 2.5 mg daily.  Refills were needed today.  Labs were needed today.  Continue to monitor.

## 2024-01-05 NOTE — ASSESSMENT & PLAN NOTE
Viral URI.  Rapid COVID and flu testing were negative today.  No evidence of bacterial infection.  Symptomatic care recommended with OTC analgesics for pain/fever, OTC decongestants and cough suppressants prn.  Stay well hydrated.  Humidifier in the bedroom at night.  Hot tea with lemon and honey.  RTC prn worsening or failure to improve of sx.

## 2024-01-05 NOTE — ASSESSMENT & PLAN NOTE
Sx have resolved followed 2 courses of Flagyl PO but she is extremely worried and wishes to be tested again for eradication.  Vag screen performed today.

## 2024-01-12 ENCOUNTER — OFFICE VISIT (OUTPATIENT)
Dept: FAMILY MEDICINE CLINIC | Age: 79
End: 2024-01-12
Payer: MEDICARE

## 2024-01-12 VITALS
BODY MASS INDEX: 37.42 KG/M2 | OXYGEN SATURATION: 98 % | WEIGHT: 211.2 LBS | DIASTOLIC BLOOD PRESSURE: 78 MMHG | TEMPERATURE: 98.1 F | SYSTOLIC BLOOD PRESSURE: 131 MMHG | HEART RATE: 69 BPM | HEIGHT: 63 IN

## 2024-01-12 DIAGNOSIS — R05.9 COUGH, UNSPECIFIED TYPE: ICD-10-CM

## 2024-01-12 DIAGNOSIS — J40 BRONCHITIS: Primary | ICD-10-CM

## 2024-01-12 LAB
EXPIRATION DATE: NORMAL
FLUAV AG UPPER RESP QL IA.RAPID: NOT DETECTED
FLUBV AG UPPER RESP QL IA.RAPID: NOT DETECTED
INTERNAL CONTROL: NORMAL
Lab: NORMAL
SARS-COV-2 AG UPPER RESP QL IA.RAPID: NOT DETECTED

## 2024-01-12 PROCEDURE — 1159F MED LIST DOCD IN RCRD: CPT | Performed by: PHYSICIAN ASSISTANT

## 2024-01-12 PROCEDURE — 3078F DIAST BP <80 MM HG: CPT | Performed by: PHYSICIAN ASSISTANT

## 2024-01-12 PROCEDURE — 3075F SYST BP GE 130 - 139MM HG: CPT | Performed by: PHYSICIAN ASSISTANT

## 2024-01-12 PROCEDURE — 99213 OFFICE O/P EST LOW 20 MIN: CPT | Performed by: PHYSICIAN ASSISTANT

## 2024-01-12 PROCEDURE — 1160F RVW MEDS BY RX/DR IN RCRD: CPT | Performed by: PHYSICIAN ASSISTANT

## 2024-01-12 PROCEDURE — 87428 SARSCOV & INF VIR A&B AG IA: CPT | Performed by: PHYSICIAN ASSISTANT

## 2024-01-12 RX ORDER — ALBUTEROL SULFATE 90 UG/1
2 AEROSOL, METERED RESPIRATORY (INHALATION) EVERY 4 HOURS PRN
Qty: 18 G | Refills: 0 | Status: SHIPPED | OUTPATIENT
Start: 2024-01-12

## 2024-01-12 NOTE — Clinical Note
Please call her on 1/15/2024 for status check.  If her symptoms are improving continue with albuterol.  If she is not seeing improvement will add systemic steroids.  If she is getting worse I want her to come back in to the office. Thanks, [place]

## 2024-01-12 NOTE — PROGRESS NOTES
Diagnoses and all orders for this visit:    1. Bronchitis (Primary)  -     albuterol sulfate  (90 Base) MCG/ACT inhaler; Inhale 2 puffs Every 4 (Four) Hours As Needed for Wheezing or Shortness of Air.  Dispense: 18 g; Refill: 0    2. Cough, unspecified type  -     POCT SARS-CoV-2 Antigen WHITNEY + Flu            Subjective     CHIEF COMPLAINT    Chief Complaint   Patient presents with    Cough     Cough, SOA, Wheezing, fever x 1 week. Was seen last week but she is getting worse.             History of Present Illness  This is an 78-year-old female presenting to the clinic complaining of worsening upper respiratory symptoms over the last week.  Her main concern at this time is cough, wheezing and feeling short of breath.  She did initially have some upper respiratory congestion, runny nose and postnasal drainage with hoarseness to her voice.  Thankfully those symptoms have mostly improved and she is left with this lingering cough.  She saw her PCP, Dr. Melgar, when her symptoms first began and was treated with Astelin, Mucinex, and Promethazine DM.  She felt like the Astelin and Mucinex were very helpful but did not get much improvement with the Promethazine DM.            Review of Systems   Constitutional:  Positive for fever (Tmax 99.7). Negative for chills and fatigue.   HENT:  Positive for congestion, rhinorrhea and voice change (improved). Negative for sore throat.    Respiratory:  Positive for cough, shortness of breath and wheezing.    Cardiovascular:  Negative for chest pain.   Gastrointestinal:  Negative for abdominal pain, diarrhea, nausea and vomiting.   Musculoskeletal:  Negative for myalgias.   Skin:  Negative for rash.   Neurological:  Negative for headaches.            Past Medical History:   Diagnosis Date    Allergic rhinitis     Anxiety     Asymptomatic menopausal state     Contusion of right knee     Fracture of ankle     History of falling     Hypertension     Keratoconus     s/p 3 cornea  transplants    Laryngeal spasm     Low back pain     Mitral valve prolapse     Other long term (current) drug therapy     Pain in right knee     Primary insomnia     Sprain of left rotator cuff capsule             Past Surgical History:   Procedure Laterality Date    BREAST BIOPSY      CHOLECYSTECTOMY      COLON SURGERY      CORNEAL TRANSPLANT      x3    HERNIA REPAIR      HYSTERECTOMY      ORIF ANKLE FRACTURE Right             Family History   Problem Relation Age of Onset    Heart disease Mother     Heart attack Mother     Hypertension Mother     Coronary artery disease Mother     Lung disease Father             Social History     Socioeconomic History    Marital status:     Number of children: 1   Tobacco Use    Smoking status: Never     Passive exposure: Past    Smokeless tobacco: Never   Vaping Use    Vaping Use: Never used   Substance and Sexual Activity    Alcohol use: No    Drug use: Defer    Sexual activity: Defer            No Known Allergies         Current Outpatient Medications on File Prior to Visit   Medication Sig Dispense Refill    amLODIPine (NORVASC) 2.5 MG tablet Take 1 tablet by mouth Daily. 90 tablet 0    atenolol (TENORMIN) 25 MG tablet 1 tablet PO QAM and 1/4 tab PO  tablet 1    azelastine (ASTELIN) 0.1 % nasal spray 2 sprays into the nostril(s) as directed by provider Daily. Use in each nostril as directed 30 mL 0    Calcium Carb-Cholecalciferol (CALCIUM 600 + D PO) Take  by mouth As Needed.      escitalopram (LEXAPRO) 10 MG tablet Take 0.5 tablets by mouth Daily. 45 tablet 1    fluorometholone (FML) 0.1 % ophthalmic suspension Administer 1 drop to both eyes Daily.      guaiFENesin (Mucinex) 600 MG 12 hr tablet Take 2 tablets by mouth 2 (Two) Times a Day As Needed for Cough or Congestion. 60 tablet 0    LORazepam (ATIVAN) 1 MG tablet TAKE 1/2 TO 1 (ONE-HALF TO ONE) TABLET BY MOUTH AT NIGHT AS NEEDED FOR SLEEP 30 tablet 2    Melatonin 1 MG capsule Take 1.5 mg by mouth Every  "Night.      Misc Natural Products (CORTISOL MANAGER PO) Take  by mouth Every Night.      Multiple Vitamins-Minerals (PRESERVISION AREDS PO) Take 1 tablet by mouth Daily.      pilocarpine (PILOCAR) 1 % ophthalmic solution       promethazine-dextromethorphan (PROMETHAZINE-DM) 6.25-15 MG/5ML syrup Take 5 mL by mouth At Night As Needed for Cough. 118 mL 0    cyclobenzaprine (FLEXERIL) 5 MG tablet Take 1 tablet by mouth At Night As Needed for Muscle Spasms. (Patient not taking: Reported on 1/12/2024) 30 tablet 0     No current facility-administered medications on file prior to visit.            /78 (BP Location: Left arm, Patient Position: Sitting, Cuff Size: Large Adult)   Pulse 69   Temp 98.1 °F (36.7 °C) (Oral)   Ht 160 cm (62.99\")   Wt 95.8 kg (211 lb 3.2 oz)   SpO2 98%   BMI 37.42 kg/m²          Objective     Physical Exam  Vitals and nursing note reviewed.   Constitutional:       General: She is not in acute distress.     Appearance: Normal appearance.   HENT:      Head: Normocephalic and atraumatic.      Right Ear: Tympanic membrane, ear canal and external ear normal.      Left Ear: Tympanic membrane, ear canal and external ear normal.      Nose: Congestion and rhinorrhea present.      Mouth/Throat:      Mouth: Mucous membranes are moist.      Pharynx: Oropharynx is clear. Posterior oropharyngeal erythema present.   Eyes:      Extraocular Movements: Extraocular movements intact.      Conjunctiva/sclera: Conjunctivae normal.      Pupils: Pupils are equal, round, and reactive to light.   Cardiovascular:      Rate and Rhythm: Normal rate and regular rhythm.      Heart sounds: Normal heart sounds.   Pulmonary:      Effort: Pulmonary effort is normal. No respiratory distress.      Breath sounds: Wheezing and rhonchi present. No rales.   Musculoskeletal:      Cervical back: Normal range of motion. No rigidity.   Skin:     General: Skin is warm and dry.   Neurological:      Mental Status: She is alert and " oriented to person, place, and time.   Psychiatric:         Mood and Affect: Mood normal.         Behavior: Behavior normal.                       Lab Results (last 24 hours)       Procedure Component Value Units Date/Time    POCT SARS-CoV-2 Antigen WHITNEY + Flu [[id number]] Collected: 01/12/24 1121    Specimen: Swab Updated: 01/12/24 1122     SARS Antigen Not Detected     Influenza A Antigen WHITNEY Not Detected     Influenza B Antigen WHITNEY Not Detected     Internal Control Passed     Lot Number [id number]     Expiration Date 09/11/24                          Diagnoses and all orders for this visit:    1. Bronchitis (Primary)  -     albuterol sulfate  (90 Base) MCG/ACT inhaler; Inhale 2 puffs Every 4 (Four) Hours As Needed for Wheezing or Shortness of Air.  Dispense: 18 g; Refill: 0    2. Cough, unspecified type  -     POCT SARS-CoV-2 Antigen WHITNEY + Flu        Yanni is presenting with productive cough and wheezing and rhonchi on exam.  She did initially have upper respiratory symptoms as well but those have improved.  Her presentation is consistent with acute viral bronchitis.  We discussed the likely viral nature of this infection and I will treat with albuterol inhaler as above.  She should continue Mucinex as directed.  She does report some dryness of her eyes with the Astelin so we discussed decreasing that to some, but it is okay to continue using as tolerated.  The Promethazine DM is not helping her very much, so she does not have to take it but she can if she wants to.  We especially discussed it could be helpful if she is having difficulty sleeping due to her cough.  Will plan to call her on 1/15/2024 for status check.  If her symptoms are improving continue with albuterol.  If she is not seeing improvement will add systemic steroids.  If she is getting worse I would want her to come back in to the office.                  FOR FULL DISCHARGE INSTRUCTIONS/COMMENTS/HANDOUTS please see the   AVS

## 2024-01-16 ENCOUNTER — OFFICE VISIT (OUTPATIENT)
Dept: FAMILY MEDICINE CLINIC | Age: 79
End: 2024-01-16
Payer: MEDICARE

## 2024-01-16 ENCOUNTER — TELEPHONE (OUTPATIENT)
Dept: FAMILY MEDICINE CLINIC | Age: 79
End: 2024-01-16
Payer: MEDICARE

## 2024-01-16 VITALS
TEMPERATURE: 98.1 F | DIASTOLIC BLOOD PRESSURE: 75 MMHG | WEIGHT: 210.2 LBS | HEIGHT: 63 IN | BODY MASS INDEX: 37.25 KG/M2 | SYSTOLIC BLOOD PRESSURE: 148 MMHG | OXYGEN SATURATION: 97 % | HEART RATE: 67 BPM

## 2024-01-16 DIAGNOSIS — J02.9 SORE THROAT: ICD-10-CM

## 2024-01-16 DIAGNOSIS — J40 BRONCHITIS: Primary | ICD-10-CM

## 2024-01-16 LAB
EXPIRATION DATE: NORMAL
INTERNAL CONTROL: NORMAL
Lab: NORMAL
S PYO AG THROAT QL: NEGATIVE

## 2024-01-16 PROCEDURE — 3078F DIAST BP <80 MM HG: CPT | Performed by: PHYSICIAN ASSISTANT

## 2024-01-16 PROCEDURE — 1160F RVW MEDS BY RX/DR IN RCRD: CPT | Performed by: PHYSICIAN ASSISTANT

## 2024-01-16 PROCEDURE — 3077F SYST BP >= 140 MM HG: CPT | Performed by: PHYSICIAN ASSISTANT

## 2024-01-16 PROCEDURE — 1159F MED LIST DOCD IN RCRD: CPT | Performed by: PHYSICIAN ASSISTANT

## 2024-01-16 PROCEDURE — 87081 CULTURE SCREEN ONLY: CPT | Performed by: PHYSICIAN ASSISTANT

## 2024-01-16 PROCEDURE — 99213 OFFICE O/P EST LOW 20 MIN: CPT | Performed by: PHYSICIAN ASSISTANT

## 2024-01-16 PROCEDURE — 87880 STREP A ASSAY W/OPTIC: CPT | Performed by: PHYSICIAN ASSISTANT

## 2024-01-16 NOTE — Clinical Note
Please give her a call on 1/18/2024 for status check.  If she is feeling better, would not recommend any other treatment.  If she is feeling the same or worse, I will send in an antibiotic.  Thanks, [place]

## 2024-01-16 NOTE — TELEPHONE ENCOUNTER
Caller: Yanni Huynh    Relationship: Self    Best call back number: [phone]     What medication are you requesting: ANTIBIOTIC    What are your current symptoms: COUGH, FEVER AT NIGHT, SORE THROAT, THROAT REDNESS    If a prescription is needed, what is your preferred pharmacy and phone number: [facility] [address] [place] [address] [address] [facility] [phone] [facility] [phone]      Additional notes: PATIENT RECEIVED A CALL FROM CHACHA OCONNELL AND WAS TOLD THAT A STEROID CAN BE PRESCRIBED IF SHE IS STILL NOT FEELING WELL. SHE WOULD LIKE AN ANTIBIOTIC INSTEAD. PATIENT WOULD LIKE A CALL FROM THE MEDICAL STAFF TO KNOW IF AN ANTIBIOTIC WOULD BE OKAY TO BE PRESCRIBED.

## 2024-01-16 NOTE — TELEPHONE ENCOUNTER
Pt states her cough has improved, however her throat feels swollen and so sore. Every time she does cough, it feels like it's ripping through her throate. She has been running a low grade temp of 99.1 mid afternoon to night. Her breathing she reported as not too bad. Pt made appt to be seen today for possible strep.

## 2024-01-16 NOTE — TELEPHONE ENCOUNTER
Caller: Yanni Huynh    Relationship: Self    Best call back number: [phone]    What was the call regarding: PATIENT WOULD LIKE TO KNOW IF THE ANTIBIOTIC WOULD BE OK TO TAKE ALONG WITH THE MEDICATIONS SHE WAS PRESCRIBED 01/15/2024. SHE IS REQUESTING CALL TO LET HER KNOW.

## 2024-01-16 NOTE — PROGRESS NOTES
Diagnoses and all orders for this visit:    1. Bronchitis (Primary)    2. Sore throat  -     POCT rapid strep A  -     Beta Strep Culture, Throat - , Throat; Future  -     Beta Strep Culture, Throat - Swab, Throat            Subjective     CHIEF COMPLAINT    Chief Complaint   Patient presents with    Sore Throat            History of Present Illness  This is an 78-year-old female presenting to the clinic with complaint of persistent sore throat and upper respiratory symptoms for the last 2 weeks.  She was diagnosed with bronchitis 4 days ago and thankfully since then she feels like her breathing has improved quite a bit.  She has only used her albuterol inhaler once or twice.  She still feels some irritation and hoarseness to her throat.  She continues to have chills and reports low-grade fevers with temperature around 99 in the evenings.  She has occasional frontal headaches but nothing persistent and she denies any sinus pain or congestion.            Review of Systems   Constitutional:  Positive for chills and fever. Negative for fatigue.   HENT:  Positive for sore throat and voice change. Negative for rhinorrhea.    Respiratory:  Positive for cough. Negative for shortness of breath and wheezing.    Cardiovascular:  Negative for chest pain.   Gastrointestinal:  Negative for abdominal pain, diarrhea, nausea and vomiting.   Musculoskeletal:  Negative for myalgias.   Skin:  Negative for rash.   Neurological:  Positive for headaches.            Past Medical History:   Diagnosis Date    Allergic rhinitis     Anxiety     Asymptomatic menopausal state     Contusion of right knee     Fracture of ankle     History of falling     Hypertension     Keratoconus     s/p 3 cornea transplants    Laryngeal spasm     Low back pain     Mitral valve prolapse     Other long term (current) drug therapy     Pain in right knee     Primary insomnia     Sprain of left rotator cuff capsule             Past Surgical History:   Procedure  Laterality Date    BREAST BIOPSY      CHOLECYSTECTOMY      COLON SURGERY      CORNEAL TRANSPLANT      x3    HERNIA REPAIR      HYSTERECTOMY      ORIF ANKLE FRACTURE Right             Family History   Problem Relation Age of Onset    Heart disease Mother     Heart attack Mother     Hypertension Mother     Coronary artery disease Mother     Lung disease Father             Social History     Socioeconomic History    Marital status:     Number of children: 1   Tobacco Use    Smoking status: Never     Passive exposure: Past    Smokeless tobacco: Never   Vaping Use    Vaping Use: Never used   Substance and Sexual Activity    Alcohol use: No    Drug use: Defer    Sexual activity: Defer            No Known Allergies         Current Outpatient Medications on File Prior to Visit   Medication Sig Dispense Refill    albuterol sulfate  (90 Base) MCG/ACT inhaler Inhale 2 puffs Every 4 (Four) Hours As Needed for Wheezing or Shortness of Air. 18 g 0    amLODIPine (NORVASC) 2.5 MG tablet Take 1 tablet by mouth Daily. 90 tablet 0    atenolol (TENORMIN) 25 MG tablet 1 tablet PO QAM and 1/4 tab PO  tablet 1    azelastine (ASTELIN) 0.1 % nasal spray 2 sprays into the nostril(s) as directed by provider Daily. Use in each nostril as directed 30 mL 0    Calcium Carb-Cholecalciferol (CALCIUM 600 + D PO) Take  by mouth As Needed.      escitalopram (LEXAPRO) 10 MG tablet Take 0.5 tablets by mouth Daily. 45 tablet 1    fluorometholone (FML) 0.1 % ophthalmic suspension Administer 1 drop to both eyes Daily.      guaiFENesin (Mucinex) 600 MG 12 hr tablet Take 2 tablets by mouth 2 (Two) Times a Day As Needed for Cough or Congestion. 60 tablet 0    LORazepam (ATIVAN) 1 MG tablet TAKE 1/2 TO 1 (ONE-HALF TO ONE) TABLET BY MOUTH AT NIGHT AS NEEDED FOR SLEEP 30 tablet 2    Melatonin 1 MG capsule Take 1.5 mg by mouth Every Night.      Misc Natural Products (CORTISOL MANAGER PO) Take  by mouth Every Night.      Multiple  "Vitamins-Minerals (PRESERVISION AREDS PO) Take 1 tablet by mouth Daily.      pilocarpine (PILOCAR) 1 % ophthalmic solution       promethazine-dextromethorphan (PROMETHAZINE-DM) 6.25-15 MG/5ML syrup Take 5 mL by mouth At Night As Needed for Cough. 118 mL 0    [DISCONTINUED] cyclobenzaprine (FLEXERIL) 5 MG tablet Take 1 tablet by mouth At Night As Needed for Muscle Spasms. 30 tablet 0     No current facility-administered medications on file prior to visit.            /75 (BP Location: Left arm, Patient Position: Sitting)   Pulse 67   Temp 98.1 °F (36.7 °C) (Oral)   Ht 160 cm (63\")   Wt 95.3 kg (210 lb 3.2 oz)   SpO2 97% Comment: on room air  BMI 37.24 kg/m²          Objective     Physical Exam  Vitals and nursing note reviewed.   Constitutional:       General: She is not in acute distress.     Appearance: Normal appearance. She is not ill-appearing or toxic-appearing.   HENT:      Head: Normocephalic and atraumatic.      Right Ear: Tympanic membrane, ear canal and external ear normal.      Left Ear: Tympanic membrane, ear canal and external ear normal.      Nose: No congestion or rhinorrhea.      Mouth/Throat:      Mouth: Mucous membranes are moist.      Pharynx: Oropharynx is clear. Posterior oropharyngeal erythema present.   Eyes:      Extraocular Movements: Extraocular movements intact.      Conjunctiva/sclera: Conjunctivae normal.      Pupils: Pupils are equal, round, and reactive to light.   Cardiovascular:      Rate and Rhythm: Normal rate and regular rhythm.      Heart sounds: Normal heart sounds.   Pulmonary:      Effort: Pulmonary effort is normal. No respiratory distress.      Breath sounds: Normal breath sounds. No wheezing, rhonchi or rales.   Musculoskeletal:      Cervical back: Normal range of motion. No rigidity.   Skin:     General: Skin is warm and dry.   Neurological:      Mental Status: She is alert and oriented to person, place, and time.   Psychiatric:         Mood and Affect: Mood " normal.         Behavior: Behavior normal.                       Lab Results (last 24 hours)       Procedure Component Value Units Date/Time    POCT rapid strep A [[id number]] Collected: 01/16/24 1357    Specimen: Swab Updated: 01/16/24 1357     Rapid Strep A Screen Negative     Internal Control Passed     Lot Number [id number]     Expiration Date 04/30/25                        Diagnoses and all orders for this visit:    1. Bronchitis (Primary)    2. Sore throat  -     POCT rapid strep A  -     Beta Strep Culture, Throat - , Throat; Future  -     Beta Strep Culture, Throat - Swab, Throat             Additional Instructions for the Follow-ups that You Need to Schedule       Beta Strep Culture, Throat - , Throat    Jan 16, 2024 (Approximate)      Release to patient: Routine Release              Overall her symptoms are improving. We discussed in detail the pros and cons of treating bronchitis with antibiotics. This is not a straightforward decision. I will plan to contact her in 48 hours for status check. If she is continuing to improve would not recommend any additional treatment. If she is feeling the same or worse, we will add a short course of antibiotics to cover for any bacterial etiology of her acute bronchitis.  Since her main complaint today is her throat, I recommend salt water gargling, throat/cough lozenges, Tylenol, warm tea with honey, and other supportive care measures.              FOR FULL DISCHARGE INSTRUCTIONS/COMMENTS/HANDOUTS please see the   AVS

## 2024-01-18 ENCOUNTER — TELEPHONE (OUTPATIENT)
Dept: FAMILY MEDICINE CLINIC | Age: 79
End: 2024-01-18
Payer: MEDICARE

## 2024-01-18 LAB — BACTERIA SPEC AEROBE CULT: NORMAL

## 2024-01-18 NOTE — TELEPHONE ENCOUNTER
Caller: Yanni Huynh    Relationship: Self    Best call back number: [phone]    What is the best time to reach you: ANYTIME    Who are you requesting to speak with (clinical staff, provider,  specific staff member): NURSE        What was the call regarding: PATIENT IS CALLING TO LET PCP KNOW THAT SHE IS FEELING BETTER TODAY.  THANKS!!

## 2024-01-18 NOTE — TELEPHONE ENCOUNTER
----- Message from Ruba Cordova PA-C sent at 1/16/2024  2:39 PM EST -----  Please give her a call on 1/18/2024 for status check.  If she is feeling better, would not recommend any other treatment.  If she is feeling the same or worse, I will send in an antibiotic.  Thanks, [place]

## 2024-03-19 ENCOUNTER — TELEPHONE (OUTPATIENT)
Dept: FAMILY MEDICINE CLINIC | Age: 79
End: 2024-03-19
Payer: MEDICARE

## 2024-04-01 DIAGNOSIS — F41.9 ANXIETY: ICD-10-CM

## 2024-04-01 RX ORDER — LORAZEPAM 1 MG/1
TABLET ORAL
Qty: 5 TABLET | Refills: 0 | Status: SHIPPED | OUTPATIENT
Start: 2024-04-01

## 2024-04-01 NOTE — TELEPHONE ENCOUNTER
LF 7/19/23 #30 with 2 RF's  LOV with pcp 1/5/24 has appt 4/10/24  UDS 1/5/24    On call for [place]

## 2024-04-10 ENCOUNTER — OFFICE VISIT (OUTPATIENT)
Dept: FAMILY MEDICINE CLINIC | Age: 79
End: 2024-04-10
Payer: MEDICARE

## 2024-04-10 VITALS
BODY MASS INDEX: 37.56 KG/M2 | OXYGEN SATURATION: 96 % | SYSTOLIC BLOOD PRESSURE: 131 MMHG | HEIGHT: 63 IN | TEMPERATURE: 98.1 F | HEART RATE: 56 BPM | WEIGHT: 212 LBS | DIASTOLIC BLOOD PRESSURE: 60 MMHG

## 2024-04-10 DIAGNOSIS — M79.18 BILATERAL BUTTOCK PAIN: ICD-10-CM

## 2024-04-10 DIAGNOSIS — Z79.899 HIGH RISK MEDICATION USE: ICD-10-CM

## 2024-04-10 DIAGNOSIS — F41.1 GENERALIZED ANXIETY DISORDER: Primary | ICD-10-CM

## 2024-04-10 DIAGNOSIS — F51.01 PRIMARY INSOMNIA: ICD-10-CM

## 2024-04-10 DIAGNOSIS — I10 ESSENTIAL (PRIMARY) HYPERTENSION: ICD-10-CM

## 2024-04-10 DIAGNOSIS — E66.01 CLASS 2 SEVERE OBESITY DUE TO EXCESS CALORIES WITH SERIOUS COMORBIDITY AND BODY MASS INDEX (BMI) OF 37.0 TO 37.9 IN ADULT: ICD-10-CM

## 2024-04-10 PROBLEM — N76.0 BACTERIAL VAGINOSIS: Status: RESOLVED | Noted: 2023-09-15 | Resolved: 2024-04-10

## 2024-04-10 PROBLEM — E66.09 CLASS 2 OBESITY DUE TO EXCESS CALORIES WITH BODY MASS INDEX (BMI) OF 36.0 TO 36.9 IN ADULT: Status: RESOLVED | Noted: 2023-06-14 | Resolved: 2024-04-10

## 2024-04-10 PROBLEM — E66.812 CLASS 2 SEVERE OBESITY DUE TO EXCESS CALORIES WITH SERIOUS COMORBIDITY AND BODY MASS INDEX (BMI) OF 37.0 TO 37.9 IN ADULT: Status: ACTIVE | Noted: 2024-04-10

## 2024-04-10 PROBLEM — B96.89 BACTERIAL VAGINOSIS: Status: RESOLVED | Noted: 2023-09-15 | Resolved: 2024-04-10

## 2024-04-10 PROBLEM — R10.32 LEFT GROIN PAIN: Status: RESOLVED | Noted: 2023-09-15 | Resolved: 2024-04-10

## 2024-04-10 PROBLEM — E66.812 CLASS 2 OBESITY DUE TO EXCESS CALORIES WITH BODY MASS INDEX (BMI) OF 36.0 TO 36.9 IN ADULT: Status: RESOLVED | Noted: 2023-06-14 | Resolved: 2024-04-10

## 2024-04-10 PROBLEM — R05.1 ACUTE COUGH: Status: RESOLVED | Noted: 2024-01-05 | Resolved: 2024-04-10

## 2024-04-10 LAB
AMPHET+METHAMPHET UR QL: NEGATIVE
AMPHETAMINES UR QL: NEGATIVE
BARBITURATES UR QL SCN: NEGATIVE
BENZODIAZ UR QL SCN: POSITIVE
BUPRENORPHINE SERPL-MCNC: NEGATIVE NG/ML
CANNABINOIDS SERPL QL: NEGATIVE
COCAINE UR QL: NEGATIVE
EXPIRATION DATE: ABNORMAL
Lab: ABNORMAL
MDMA UR QL SCN: NEGATIVE
METHADONE UR QL SCN: NEGATIVE
MORPHINE/OPIATES SCREEN, URINE: NEGATIVE
OXYCODONE UR QL SCN: NEGATIVE
PCP UR QL SCN: NEGATIVE

## 2024-04-10 RX ORDER — AMLODIPINE BESYLATE 2.5 MG/1
2.5 TABLET ORAL DAILY
Qty: 90 TABLET | Refills: 1 | Status: SHIPPED | OUTPATIENT
Start: 2024-04-10

## 2024-04-10 RX ORDER — LORAZEPAM 1 MG/1
TABLET ORAL
Qty: 30 TABLET | Refills: 2 | Status: SHIPPED | OUTPATIENT
Start: 2024-04-10

## 2024-04-10 NOTE — PROGRESS NOTES
"Chief Complaint  Anxiety (3 month f/u) and Dizziness (Had an incident of feeling dizzy and feeling like balance is off and had a small fall off of steps. Felt very shaky afterwards.  Hips are sore now)    Subjective          Yanni Huynh presents to [facility] today for routine follow-up.    She had a dizzy spell about a week ago that caused her to feel off balance.  She was coming up her steps, and since she has no handrail, she has to bend down to the level of the steps to steady herself.  She ended up falling down a small flight of 2 steps coming down from her front porch.  Afterwards, she felt very shaky for about 15 min afterwards.  Her L wrist was hurting initially so she used ice on it.  That's no longer bothering her much at all unless she tries to twist something small (like a soda cap).  She is experiencing some ongoing hip soreness bilaterally, mostly in her bottom.      She is on lorazepam, melatonin and escitalopram for anxiety and insomnia.  Mood is \"fine.\"   She denies panic attacks and baseline anxiety is well-controlled.  She previously followed with Dr. Demetrius Romero for sleep therapy in 2015 to significantly decrease the amount of lorazepam she was using but she was unable to crease her bedtime dose lower than 1 mg.  She has tried trazodone and Ambien previously but had intolerable side effects with both.      She is on amlodipine and atenolol for HTN.  Her blood pressure has been well controlled.  No chest pain, palpitations, or shortness of breath.       She follows with Dr. Gallardo [facility] for macular degeneration.  Status post corneal transplant.      Current Outpatient Medications:     albuterol sulfate  (90 Base) MCG/ACT inhaler, Inhale 2 puffs Every 4 (Four) Hours As Needed for Wheezing or Shortness of Air., Disp: 18 g, Rfl: 0    amLODIPine (NORVASC) 2.5 MG tablet, Take 1 tablet by mouth Daily., Disp: 90 tablet, Rfl: 1    atenolol (TENORMIN) " "25 MG tablet, 1 tablet PO QAM and 1/4 tab PO QHS, Disp: 135 tablet, Rfl: 1    azelastine (ASTELIN) 0.1 % nasal spray, 2 sprays into the nostril(s) as directed by provider Daily. Use in each nostril as directed (Patient taking differently: 2 sprays into the nostril(s) as directed by provider As Needed. Use in each nostril as directed), Disp: 30 mL, Rfl: 0    Calcium Carb-Cholecalciferol (CALCIUM 600 + D PO), Take  by mouth As Needed., Disp: , Rfl:     escitalopram (LEXAPRO) 10 MG tablet, Take 0.5 tablets by mouth Daily., Disp: 45 tablet, Rfl: 1    fluorometholone (FML) 0.1 % ophthalmic suspension, Administer 1 drop to both eyes Daily., Disp: , Rfl:     LORazepam (ATIVAN) 1 MG tablet, TAKE 1/2 TO 1 (ONE-HALF TO ONE) TABLET BY MOUTH NIGHTLY AS NEEDED FOR SLEEP, Disp: 30 tablet, Rfl: 2    Melatonin 1 MG capsule, Take 1.5 mg by mouth Every Night., Disp: , Rfl:     Misc Natural Products (CORTISOL MANAGER PO), Take  by mouth Every Night., Disp: , Rfl:     Multiple Vitamins-Minerals (PRESERVISION AREDS PO), Take 1 tablet by mouth Daily., Disp: , Rfl:     pilocarpine (PILOCAR) 1 % ophthalmic solution, , Disp: , Rfl:   Medications Discontinued During This Encounter   Medication Reason    guaiFENesin (Mucinex) 600 MG 12 hr tablet *Therapy completed    promethazine-dextromethorphan (PROMETHAZINE-DM) 6.25-15 MG/5ML syrup *Therapy completed    amLODIPine (NORVASC) 2.5 MG tablet Reorder    LORazepam (ATIVAN) 1 MG tablet Reorder         Allergies:  Patient has no known allergies.      Objective   Vital Signs:   Vitals:    04/10/24 1058   BP: 131/60   BP Location: Left arm   Patient Position: Sitting   Cuff Size: Large Adult   Pulse: 56   Temp: 98.1 °F (36.7 °C)   TempSrc: Oral   SpO2: 96%   Weight: 96.2 kg (212 lb)   Height: 160 cm (62.99\")     Body mass index is 37.56 kg/m².         Physical Exam  Vitals reviewed.   Constitutional:       General: She is not in acute distress.     Appearance: Normal appearance. She is " well-developed.   HENT:      Head: Normocephalic and atraumatic.      Right Ear: External ear normal.      Left Ear: External ear normal.   Eyes:      Extraocular Movements: Extraocular movements intact.      Conjunctiva/sclera: Conjunctivae normal.      Pupils: Pupils are equal, round, and reactive to light.   Cardiovascular:      Rate and Rhythm: Normal rate and regular rhythm.      Heart sounds: No murmur heard.  Pulmonary:      Effort: Pulmonary effort is normal.      Breath sounds: Normal breath sounds. No wheezing, rhonchi or rales.   Abdominal:      General: Bowel sounds are normal. There is no distension.      Palpations: Abdomen is soft.      Tenderness: There is no abdominal tenderness.      Hernia: There is no hernia in the left inguinal area or right inguinal area.   Genitourinary:     General: Normal vulva.      Pubic Area: No rash.       Labia:         Right: No rash, tenderness, lesion or injury.         Left: No rash, tenderness, lesion or injury.       Urethra: No prolapse, urethral pain, urethral swelling or urethral lesion.      Vagina: Normal.      Uterus: Absent.       Comments: +benign pea-sized cyst R labia majora  Chaperone declined  Musculoskeletal:         General: Normal range of motion.   Neurological:      Mental Status: She is alert.   Psychiatric:         Mood and Affect: Affect normal.         Lab Results   Component Value Date    GLUCOSE 130 (H) 01/05/2024    BUN 19 01/05/2024    CREATININE 0.65 01/05/2024    EGFRIFNONA 97 01/13/2022    BCR 29.2 (H) 01/05/2024    K 4.4 01/05/2024    CO2 26.6 01/05/2024    CALCIUM 9.3 01/05/2024    ALBUMIN 4.5 01/05/2024    LABIL2 1.3 (L) 03/01/2021    AST 19 01/05/2024    ALT 23 01/05/2024       Lab Results   Component Value Date    CHOL 178 01/05/2024    CHLPL 185 03/01/2021    TRIG 175 (H) 01/05/2024    HDL 42 01/05/2024     (H) 01/05/2024     Lab Results   Component Value Date    WBC 8.43 01/05/2024    HGB 14.1 01/05/2024    HCT 41.2  01/05/2024    MCV 83.6 01/05/2024     01/05/2024            Assessment and Plan    Assessment & Plan  Generalized anxiety disorder  Stable on current regimen.  Symptoms are well controlled.  No adverse effects. She does require ongoing use of this controlled substance to function.  Tox screen was due today.  Prior tox screen appropriate.  OSWALDO was run today.  Refills were needed today.  RTC 3 months.    Primary insomnia  As per anxiety plan.  High risk medication use    Class 2 severe obesity due to excess calories with serious comorbidity and body mass index (BMI) of 37.0 to 37.9 in adult  Patient's (Body mass index is 37.56 kg/m².) indicates that they are  with health conditions that include  . Weight is . BMI  . We discussed .   Essential (primary) hypertension  Blood pressure has been running at goal.  Continue amlodipine 2.5 mg daily and atenolol 25 mg daily.  Refills were needed today.  Labs were not needed today.  Continue to monitor.    Bilateral buttock pain  Soft tissue pain in the bilateral buttocks after her fall.  She is bearing weight well.  Hip fracture is extremely unlikely.  Continue to use ice and OTC analgesic as needed.  Recommend she go ahead with the handrails she is planning to get installed on that front porch.    Orders Placed This Encounter   Procedures    POC Medline 12 Panel Urine Drug Screen     New Medications Ordered This Visit   Medications    LORazepam (ATIVAN) 1 MG tablet     Sig: TAKE 1/2 TO 1 (ONE-HALF TO ONE) TABLET BY MOUTH NIGHTLY AS NEEDED FOR SLEEP     Dispense:  30 tablet     Refill:  2    amLODIPine (NORVASC) 2.5 MG tablet     Sig: Take 1 tablet by mouth Daily.     Dispense:  90 tablet     Refill:  1       Follow Up   Return in about 3 months (around 7/10/2024) for Medicare Wellness.  Patient was given instructions and counseling regarding her condition or for health maintenance advice. Please see specific information pulled into the AVS if appropriate.            09/15/2023

## 2024-04-10 NOTE — ASSESSMENT & PLAN NOTE
Blood pressure has been running at goal.  Continue amlodipine 2.5 mg daily and atenolol 25 mg daily.  Refills were needed today.  Labs were not needed today.  Continue to monitor.

## 2024-04-10 NOTE — ASSESSMENT & PLAN NOTE
Patient's (Body mass index is 37.56 kg/m².) indicates that they are morbidly/severely obese (BMI > 40 or > 35 with obesity - related health condition) with health conditions that include hypertension . Weight is worsening. BMI  is above average; BMI management plan is completed. We discussed portion control and increasing exercise.

## 2024-07-22 DIAGNOSIS — F41.1 GENERALIZED ANXIETY DISORDER: ICD-10-CM

## 2024-07-22 RX ORDER — ESCITALOPRAM OXALATE 10 MG/1
5 TABLET ORAL DAILY
Qty: 45 TABLET | Refills: 0 | Status: SHIPPED | OUTPATIENT
Start: 2024-07-22

## 2024-08-30 ENCOUNTER — OFFICE VISIT (OUTPATIENT)
Dept: FAMILY MEDICINE CLINIC | Age: 79
End: 2024-08-30
Payer: MEDICARE

## 2024-08-30 ENCOUNTER — LAB (OUTPATIENT)
Dept: LAB | Facility: HOSPITAL | Age: 79
End: 2024-08-30
Payer: MEDICARE

## 2024-08-30 VITALS
HEIGHT: 63 IN | OXYGEN SATURATION: 96 % | WEIGHT: 214.4 LBS | DIASTOLIC BLOOD PRESSURE: 51 MMHG | BODY MASS INDEX: 37.99 KG/M2 | TEMPERATURE: 98.5 F | SYSTOLIC BLOOD PRESSURE: 127 MMHG | HEART RATE: 53 BPM

## 2024-08-30 DIAGNOSIS — R05.9 COUGH, UNSPECIFIED TYPE: ICD-10-CM

## 2024-08-30 DIAGNOSIS — Z78.0 POSTMENOPAUSAL STATE: ICD-10-CM

## 2024-08-30 DIAGNOSIS — I10 ESSENTIAL (PRIMARY) HYPERTENSION: ICD-10-CM

## 2024-08-30 DIAGNOSIS — F41.1 GENERALIZED ANXIETY DISORDER: ICD-10-CM

## 2024-08-30 DIAGNOSIS — Z00.00 ANNUAL PHYSICAL EXAM: Primary | ICD-10-CM

## 2024-08-30 DIAGNOSIS — Z94.7 CORNEAL TRANSPLANT STATUS: ICD-10-CM

## 2024-08-30 DIAGNOSIS — Z79.899 LONG-TERM USE OF HIGH-RISK MEDICATION: ICD-10-CM

## 2024-08-30 PROBLEM — E66.812 CLASS 2 SEVERE OBESITY DUE TO EXCESS CALORIES WITH SERIOUS COMORBIDITY AND BODY MASS INDEX (BMI) OF 37.0 TO 37.9 IN ADULT: Status: RESOLVED | Noted: 2024-04-10 | Resolved: 2024-08-30

## 2024-08-30 PROBLEM — E66.01 CLASS 2 SEVERE OBESITY DUE TO EXCESS CALORIES WITH SERIOUS COMORBIDITY AND BODY MASS INDEX (BMI) OF 37.0 TO 37.9 IN ADULT: Status: RESOLVED | Noted: 2024-04-10 | Resolved: 2024-08-30

## 2024-08-30 PROCEDURE — 80053 COMPREHEN METABOLIC PANEL: CPT

## 2024-08-30 PROCEDURE — 80061 LIPID PANEL: CPT

## 2024-08-30 PROCEDURE — 36415 COLL VENOUS BLD VENIPUNCTURE: CPT

## 2024-08-30 PROCEDURE — 85025 COMPLETE CBC W/AUTO DIFF WBC: CPT

## 2024-08-30 RX ORDER — ATENOLOL 25 MG/1
TABLET ORAL
Qty: 135 TABLET | Refills: 1 | Status: SHIPPED | OUTPATIENT
Start: 2024-08-30

## 2024-08-30 RX ORDER — ESCITALOPRAM OXALATE 10 MG/1
5 TABLET ORAL DAILY
Qty: 45 TABLET | Refills: 1 | Status: SHIPPED | OUTPATIENT
Start: 2024-08-30

## 2024-08-30 NOTE — PROGRESS NOTES
Subjective   The ABCs of the Annual Wellness Visit  Medicare Wellness Visit      Yanni Huynh is a 78 y.o. patient who presents for a Medicare Wellness Visit.    She is UTD on colonoscopy, last done 11/2017 and this was normal.  Ten year repeat recommended.  Pap smear is no longer indicated by age and history; s/p hysterectomy.  Mammogram is no longer indicated by age and history.  She is due for DEXA, last done 7/2020 and this showed osteopenia.  She is due for COVID, [id number], Shingrix.  She is UTD on Td (1/2019).  Flu shot will be due in a few weeks.  She is due for routine labs including HTN panel and CBC.     The following portions of the patient's history were reviewed and   updated as appropriate: allergies, current medications, past family history, past medical history, past social history, past surgical history, and problem list.    Compared to one year ago, the patient's physical   health is worse.  Compared to one year ago, the patient's mental   health is the same.    Recent Hospitalizations:  She was not admitted to the hospital during the last year.     Current Medical Providers:  Patient Care Team:  Maria E Melgar MD as PCP - General (Family Medicine)    Outpatient Medications Prior to Visit   Medication Sig Dispense Refill    amLODIPine (NORVASC) 2.5 MG tablet Take 1 tablet by mouth Daily. 90 tablet 1    Calcium Carb-Cholecalciferol (CALCIUM 600 + D PO) Take  by mouth As Needed.      fluorometholone (FML) 0.1 % ophthalmic suspension Administer 1 drop to both eyes Daily.      LORazepam (ATIVAN) 1 MG tablet TAKE 1/2 TO 1 (ONE-HALF TO ONE) TABLET BY MOUTH NIGHTLY AS NEEDED FOR SLEEP 30 tablet 2    Melatonin 1 MG capsule Take 1.5 mg by mouth Every Night.      Misc Natural Products (CORTISOL MANAGER PO) Take  by mouth Every Night.      Multiple Vitamins-Minerals (PRESERVISION AREDS PO) Take 1 tablet by mouth Daily.      pilocarpine (PILOCAR) 1 % ophthalmic solution       atenolol (TENORMIN)  "25 MG tablet 1 tablet PO QAM and 1/4 tab PO QHS (Patient taking differently: 1 tablet. 1 tablet PO QAM and 1/4 tab PO QHS) 135 tablet 1    escitalopram (LEXAPRO) 10 MG tablet Take 1/2 (one-half) tablet by mouth once daily 45 tablet 0    albuterol sulfate  (90 Base) MCG/ACT inhaler Inhale 2 puffs Every 4 (Four) Hours As Needed for Wheezing or Shortness of Air. (Patient not taking: Reported on 8/30/2024) 18 g 0    azelastine (ASTELIN) 0.1 % nasal spray 2 sprays into the nostril(s) as directed by provider Daily. Use in each nostril as directed (Patient not taking: Reported on 8/30/2024) 30 mL 0     No facility-administered medications prior to visit.     No opioid medication identified on active medication list. I have reviewed chart for other potential  high risk medication/s and harmful drug interactions in the elderly.      Aspirin is not on active medication list.  Aspirin use is not indicated based on review of current medical condition/s. Risk of harm outweighs potential benefits.  .    Patient Active Problem List   Diagnosis    Ulnar neuritis, left    Primary osteoarthritis of right knee    Basal cell carcinoma    Fibrocystic breast changes    Incisional hernia    Essential (primary) hypertension    Generalized anxiety disorder    History of falling    Primary insomnia    Corneal transplant status    Bilateral nonexudative age-related macular degeneration    Other constipation    Stress incontinence    Chronic left shoulder pain     Advance Care Planning Advance Directive is not on file.  ACP discussion was held with the patient during this visit. Patient does not have an advance directive, information provided.            Objective   Vitals:    08/30/24 1500   BP: 141/76   BP Location: Left arm   Patient Position: Sitting   Cuff Size: Large Adult   Pulse: 57   Temp: 98.5 °F (36.9 °C)   TempSrc: Oral   SpO2: 96%   Weight: 97.3 kg (214 lb 6.4 oz)   Height: 160 cm (62.99\")       Estimated body mass index is " "37.99 kg/m² as calculated from the following:    Height as of this encounter: 160 cm (62.99\").    Weight as of this encounter: 97.3 kg (214 lb 6.4 oz).            Does the patient have evidence of cognitive impairment? No                                                                                                Health  Risk Assessment    Smoking Status:  Social History     Tobacco Use   Smoking Status Never    Passive exposure: Past   Smokeless Tobacco Never     Alcohol Consumption:  Social History     Substance and Sexual Activity   Alcohol Use No       Fall Risk Screen  STEADI Fall Risk Assessment was completed, and patient is at HIGH risk for falls. Assessment completed on:2024    Depression Screenin/30/2024     3:05 PM   PHQ-2/PHQ-9 Depression Screening   Little Interest or Pleasure in Doing Things 0-->not at all   Feeling Down, Depressed or Hopeless 0-->not at all   PHQ-9: Brief Depression Severity Measure Score 0     Health Habits and Functional and Cognitive Screenin/30/2024     3:06 PM   Functional & Cognitive Status   Do you have difficulty preparing food and eating? No   Do you have difficulty bathing yourself, getting dressed or grooming yourself? No   Do you have difficulty using the toilet? No   Do you have difficulty moving around from place to place? No   Do you have trouble with steps or getting out of a bed or a chair? Yes   Current Diet Well Balanced Diet   Dental Exam Up to date   Eye Exam Up to date   Exercise (times per week) 0 times per week   Current Exercises Include No Regular Exercise   Do you need help using the phone?  No   Are you deaf or do you have serious difficulty hearing?  No   Do you need help to go to places out of walking distance? Yes   Do you need help shopping? No   Do you need help preparing meals?  No   Do you need help with housework?  No   Do you need help with laundry? No   Do you need help taking your medications? No   Do you need help " managing money? No   Do you ever drive or ride in a car without wearing a seat belt? No   Have you felt unusual stress, anger or loneliness in the last month? No   Who do you live with? Spouse   If you need help, do you have trouble finding someone available to you? No   Have you been bothered in the last four weeks by sexual problems? No   Do you have difficulty concentrating, remembering or making decisions? Yes           Age-appropriate Screening Schedule:  Refer to the list below for future screening recommendations based on patient's age, sex and/or medical conditions. Orders for these recommended tests are listed in the plan section. The patient has been provided with a written plan.    Health Maintenance List  Health Maintenance   Topic Date Due    Pneumococcal Vaccine 65+ (1 of 2 - PCV) Never done    ZOSTER VACCINE (1 of 2) Never done    DXA SCAN  07/15/2022    INFLUENZA VACCINE  08/01/2024    COVID-19 Vaccine (2 - Bryson risk series) 09/01/2024 (Originally 6/4/2021)    RSV Vaccine - Adults (1 - 1-dose 60+ series) 04/10/2025 (Originally 12/4/2003)    BMI FOLLOWUP  04/10/2025    ANNUAL WELLNESS VISIT  08/30/2025    TDAP/TD VACCINES (2 - Tdap) 01/01/2029    COLORECTAL CANCER SCREENING  Discontinued                                                                                                                                                CMS Preventative Services Quick Reference  Risk Factors Identified During Encounter  Immunizations Discussed/Encouraged: Prevnar 20 (Pneumococcal 20-valent conjugate), Shingrix, and COVID19    The above risks/problems have been discussed with the patient.  Pertinent information has been shared with the patient in the After Visit Summary.  An After Visit Summary and PPPS were made available to the patient.    Follow Up:   Next Medicare Wellness visit to be scheduled in 1 year.         Additional E&M Note during same encounter follows:  Patient has additional, significant, and  "separately identifiable condition(s)/problem(s) that require work above and beyond the Medicare Wellness Visit     Chief Complaint  Medicare Wellness-subsequent and Covid (Dx w/ covid x 1 week ago, would like to be re-tested)    Subjective   HPI  Yanni is also being seen today for additional medical problem/s.    She was diagnosed with COVID on Sunday.  She is doing ilan overall.  She does still notice a tiny cough and some low grade temperatures persisting.  She wonders if she needs to be tested today in order to be safe to go to [employer] on Sunday.     She is on lorazepam, melatonin and escitalopram for anxiety and insomnia.  Mood has been \"fine.\"   No panic attacks and baseline anxiety is well-controlled.  She has previously followed with Dr. Demetrius Romero for sleep therapy (2015) which allowed her to significantly decrease the amount of lorazepam she was using but she was unable to decrease her bedtime dose lower than 1 mg.  She has tried trazodone and Ambien previously but had intolerable side effects with both.      She is on amlodipine and atenolol for hypertension.  BP has been well controlled.  Denies chest pain, palpitations, or shortness of breath.       She follows with Dr. Gallardo [facility] for macular degeneration.  Status post corneal transplant.    Review of Systems   Constitutional:  Negative for chills, fatigue and fever.   HENT:  Negative for congestion, hearing loss and rhinorrhea.    Eyes:  Negative for pain and visual disturbance.   Respiratory:  Positive for cough. Negative for shortness of breath.    Cardiovascular:  Negative for chest pain and palpitations.   Gastrointestinal:  Negative for abdominal pain, constipation, diarrhea, nausea and vomiting.   Genitourinary:  Negative for difficulty urinating and dysuria.   Musculoskeletal:  Negative for arthralgias and myalgias.   Neurological:  Negative for weakness and numbness.   Psychiatric/Behavioral:  Negative for dysphoric mood and " "sleep disturbance. The patient is not nervous/anxious.               Objective   Vital Signs:  /76 (BP Location: Left arm, Patient Position: Sitting, Cuff Size: Large Adult)   Pulse 57   Temp 98.5 °F (36.9 °C) (Oral)   Ht 160 cm (62.99\")   Wt 97.3 kg (214 lb 6.4 oz)   SpO2 96%   BMI 37.99 kg/m²   Physical Exam  Vitals reviewed.   Constitutional:       General: She is not in acute distress.     Appearance: Normal appearance. She is well-developed.   HENT:      Head: Normocephalic and atraumatic.      Right Ear: External ear normal.      Left Ear: External ear normal.      Mouth/Throat:      Mouth: Mucous membranes are moist.   Eyes:      Extraocular Movements: Extraocular movements intact.      Conjunctiva/sclera: Conjunctivae normal.      Pupils: Pupils are equal, round, and reactive to light.   Cardiovascular:      Rate and Rhythm: Normal rate and regular rhythm.      Heart sounds: No murmur heard.  Pulmonary:      Effort: Pulmonary effort is normal.      Breath sounds: Normal breath sounds. No wheezing, rhonchi or rales.   Abdominal:      General: Bowel sounds are normal. There is no distension.      Palpations: Abdomen is soft.      Tenderness: There is no abdominal tenderness.   Musculoskeletal:         General: Normal range of motion.   Skin:     General: Skin is warm and dry.   Neurological:      Mental Status: She is alert and oriented to person, place, and time.      Deep Tendon Reflexes: Reflexes normal.   Psychiatric:         Mood and Affect: Mood and affect normal.         Behavior: Behavior normal.         Thought Content: Thought content normal.         Judgment: Judgment normal.       Lab Results   Component Value Date    GLUCOSE 130 (H) 01/05/2024    BUN 19 01/05/2024    CREATININE 0.65 01/05/2024    EGFR 89.1 01/05/2024    BCR 29.2 (H) 01/05/2024    K 4.4 01/05/2024    CO2 26.6 01/05/2024    CALCIUM 9.3 01/05/2024    ALBUMIN 4.5 01/05/2024    BILITOT 0.7 01/05/2024    AST 19 01/05/2024    " ALT 23 01/05/2024       Lab Results   Component Value Date    CHOL 178 01/05/2024    CHLPL 185 03/01/2021    TRIG 175 (H) 01/05/2024    HDL 42 01/05/2024     (H) 01/05/2024       Lab Results   Component Value Date    WBC 8.43 01/05/2024    HGB 14.1 01/05/2024    HCT 41.2 01/05/2024    MCV 83.6 01/05/2024     01/05/2024               Assessment and Plan               Annual physical exam  She is UTD on colonoscopy, last done 11/2017 and this was normal.  Ten year repeat recommended.  Pap smear is no longer indicated by age and history; s/p hysterectomy.  Mammogram is no longer indicated by age and history.  She is due for DEXA, last done 7/2020 and this showed osteopenia; ordered.  She is due for COVID, [id number], Shingrix.  Shingrix can be done at the pharmacy.  She is going to ask Dr. Gallardo about [id number].  She is UTD on Td (1/2019).  Flu shot and COVID will be due in a few weeks.  She is due for routine labs including HTN panel and CBC; ordered.   Cough, unspecified type  +COVID last Friday.  S/p treatment with Paxlovid.    Discussed that being 10 days out from dx and having completed treatment with Paxlovid with symptom resolution, she does not need to get a negative COVID test to feel reassured that she can safely attend [employer] this Sunday.    Generalized anxiety disorder   Stable on current regimen.  Symptoms are well controlled.  No adverse effects. She does require ongoing use of this controlled substance to function.  Tox screen was due today.  Prior tox screen appropriate.  OSWALDO was run today.  Refills were not needed today.  RTC 3 months.   Long-term use of high-risk medication    Essential (primary) hypertension  Blood pressure has been running at goal.  Continue atenolol 25 mg 1 tab Qday and 0.25 tab QHS and amlodipine 2.5 mg daily.  Refills were needed today.  Labs were needed today.  Continue to monitor.  Corneal transplant status  S/p corneal transplant.  [facility]  Postmenopausal state      Orders Placed This Encounter   Procedures    DEXA Bone Density Axial     Standing Status:   Future     Standing Expiration Date:   2025     Order Specific Question:   Is patient taking or have taken long term Glucocorticoid (steroids)?     Answer:   No     Order Specific Question:   Does the patient have rheumatoid arthritis?     Answer:   No     Order Specific Question:   Reason for Exam:     Answer:   screening     Order Specific Question:   Release to patient     Answer:   Routine Release [[id number]]     Order Specific Question:   Does the patient have secondary osteoporosis?     Answer:   No    Comprehensive Metabolic Panel     Standing Status:   Future     Order Specific Question:   Release to patient     Answer:   Routine Release [[id number]]    Lipid Panel     Standing Status:   Future     Order Specific Question:   Release to patient     Answer:   Routine Release [[id number]]    CBC Auto Differential     Standing Status:   Future     Standing Expiration Date:   2025     Order Specific Question:   Release to patient     Answer:   Routine Release [[id number]]    POC Medline 12 Panel Urine Drug Screen     Order Specific Question:   Release to patient     Answer:   Routine Release [[id number]]     New Medications Ordered This Visit   Medications    escitalopram (LEXAPRO) 10 MG tablet     Sig: Take 0.5 tablets by mouth Daily.     Dispense:  45 tablet     Refill:  1    atenolol (TENORMIN) 25 MG tablet     Si tablet PO QAM and 1/4 tab PO QHS     Dispense:  135 tablet     Refill:  1          Follow Up   Return in about 3 months (around 2024) for Recheck.  Patient was given instructions and counseling regarding her condition or for health maintenance advice. Please see specific information pulled into the AVS if appropriate.

## 2024-08-30 NOTE — ASSESSMENT & PLAN NOTE
Blood pressure has been running at goal.  Continue atenolol 25 mg 1 tab Qday and 0.25 tab QHS and amlodipine 2.5 mg daily.  Refills were needed today.  Labs were needed today.  Continue to monitor.

## 2024-08-31 LAB
ALBUMIN SERPL-MCNC: 4.4 G/DL (ref 3.5–5.2)
ALBUMIN/GLOB SERPL: 1.5 G/DL
ALP SERPL-CCNC: 93 U/L (ref 39–117)
ALT SERPL W P-5'-P-CCNC: 24 U/L (ref 1–33)
ANION GAP SERPL CALCULATED.3IONS-SCNC: 12 MMOL/L (ref 5–15)
AST SERPL-CCNC: 18 U/L (ref 1–32)
BASOPHILS # BLD AUTO: 0.03 10*3/MM3 (ref 0–0.2)
BASOPHILS NFR BLD AUTO: 0.3 % (ref 0–1.5)
BILIRUB SERPL-MCNC: 0.4 MG/DL (ref 0–1.2)
BUN SERPL-MCNC: 18 MG/DL (ref 8–23)
BUN/CREAT SERPL: 32.1 (ref 7–25)
CALCIUM SPEC-SCNC: 9.6 MG/DL (ref 8.6–10.5)
CHLORIDE SERPL-SCNC: 100 MMOL/L (ref 98–107)
CHOLEST SERPL-MCNC: 162 MG/DL (ref 0–200)
CO2 SERPL-SCNC: 28 MMOL/L (ref 22–29)
CREAT SERPL-MCNC: 0.56 MG/DL (ref 0.57–1)
DEPRECATED RDW RBC AUTO: 40 FL (ref 37–54)
EGFRCR SERPLBLD CKD-EPI 2021: 92.4 ML/MIN/1.73
EOSINOPHIL # BLD AUTO: 0.28 10*3/MM3 (ref 0–0.4)
EOSINOPHIL NFR BLD AUTO: 2.9 % (ref 0.3–6.2)
ERYTHROCYTE [DISTWIDTH] IN BLOOD BY AUTOMATED COUNT: 12.8 % (ref 12.3–15.4)
GLOBULIN UR ELPH-MCNC: 2.9 GM/DL
GLUCOSE SERPL-MCNC: 126 MG/DL (ref 65–99)
HCT VFR BLD AUTO: 39.2 % (ref 34–46.6)
HDLC SERPL-MCNC: 38 MG/DL (ref 40–60)
HGB BLD-MCNC: 13.3 G/DL (ref 12–15.9)
IMM GRANULOCYTES # BLD AUTO: 0.1 10*3/MM3 (ref 0–0.05)
IMM GRANULOCYTES NFR BLD AUTO: 1 % (ref 0–0.5)
LDLC SERPL CALC-MCNC: 87 MG/DL (ref 0–100)
LDLC/HDLC SERPL: 2.1 {RATIO}
LYMPHOCYTES # BLD AUTO: 2.63 10*3/MM3 (ref 0.7–3.1)
LYMPHOCYTES NFR BLD AUTO: 26.9 % (ref 19.6–45.3)
MCH RBC QN AUTO: 29.2 PG (ref 26.6–33)
MCHC RBC AUTO-ENTMCNC: 33.9 G/DL (ref 31.5–35.7)
MCV RBC AUTO: 86 FL (ref 79–97)
MONOCYTES # BLD AUTO: 0.79 10*3/MM3 (ref 0.1–0.9)
MONOCYTES NFR BLD AUTO: 8.1 % (ref 5–12)
NEUTROPHILS NFR BLD AUTO: 5.95 10*3/MM3 (ref 1.7–7)
NEUTROPHILS NFR BLD AUTO: 60.8 % (ref 42.7–76)
NRBC BLD AUTO-RTO: 0 /100 WBC (ref 0–0.2)
PLATELET # BLD AUTO: 271 10*3/MM3 (ref 140–450)
PMV BLD AUTO: 10.9 FL (ref 6–12)
POTASSIUM SERPL-SCNC: 3.7 MMOL/L (ref 3.5–5.2)
PROT SERPL-MCNC: 7.3 G/DL (ref 6–8.5)
RBC # BLD AUTO: 4.56 10*6/MM3 (ref 3.77–5.28)
SODIUM SERPL-SCNC: 140 MMOL/L (ref 136–145)
TRIGL SERPL-MCNC: 221 MG/DL (ref 0–150)
VLDLC SERPL-MCNC: 37 MG/DL (ref 5–40)
WBC NRBC COR # BLD AUTO: 9.78 10*3/MM3 (ref 3.4–10.8)

## 2024-09-27 ENCOUNTER — OFFICE VISIT (OUTPATIENT)
Dept: FAMILY MEDICINE CLINIC | Age: 79
End: 2024-09-27
Payer: MEDICARE

## 2024-09-27 DIAGNOSIS — J06.9 VIRAL URI WITH COUGH: Primary | ICD-10-CM

## 2024-09-27 PROCEDURE — 1160F RVW MEDS BY RX/DR IN RCRD: CPT | Performed by: NURSE PRACTITIONER

## 2024-09-27 PROCEDURE — 1126F AMNT PAIN NOTED NONE PRSNT: CPT | Performed by: NURSE PRACTITIONER

## 2024-09-27 PROCEDURE — 3078F DIAST BP <80 MM HG: CPT | Performed by: NURSE PRACTITIONER

## 2024-09-27 PROCEDURE — 3077F SYST BP >= 140 MM HG: CPT | Performed by: NURSE PRACTITIONER

## 2024-09-27 PROCEDURE — 99212 OFFICE O/P EST SF 10 MIN: CPT | Performed by: NURSE PRACTITIONER

## 2024-09-27 PROCEDURE — 1159F MED LIST DOCD IN RCRD: CPT | Performed by: NURSE PRACTITIONER

## 2024-09-30 VITALS
WEIGHT: 217 LBS | HEART RATE: 59 BPM | OXYGEN SATURATION: 96 % | SYSTOLIC BLOOD PRESSURE: 140 MMHG | TEMPERATURE: 97.8 F | HEIGHT: 63 IN | BODY MASS INDEX: 38.45 KG/M2 | DIASTOLIC BLOOD PRESSURE: 66 MMHG

## 2024-09-30 NOTE — PROGRESS NOTES
"Chief Complaint  No chief complaint on file.    Subjective      Yanni Huynh is an 78-year-old female that presents to [facility] with complaints of sneezing, coughing, sore throat, sinus congestion and right ear popping.  Symptoms started 5 days ago.  She denies fever, body aches or chills.  She has been taking Caridad-Shohola severe cold and flu, azelastine nasal spray and over-the-counter cough medication.  She denies any known sick contacts or exposures.    History of Present Illness    Current Outpatient Medications   Medication Instructions    amLODIPine (NORVASC) 2.5 mg, Oral, Daily    atenolol (TENORMIN) 25 MG tablet 1 tablet PO QAM and 1/4 tab PO QHS    Calcium Carb-Cholecalciferol (CALCIUM 600 + D PO) Oral, As Needed    escitalopram (LEXAPRO) 5 mg, Oral, Daily    fluorometholone (FML) 0.1 % ophthalmic suspension 1 drop, Both Eyes, Daily    LORazepam (ATIVAN) 1 MG tablet TAKE 1/2 TO 1 (ONE-HALF TO ONE) TABLET BY MOUTH NIGHTLY AS NEEDED FOR SLEEP    Melatonin 1.5 mg, Oral, Nightly    Misc Natural Products (CORTISOL MANAGER PO) Oral, Nightly    Multiple Vitamins-Minerals (PRESERVISION AREDS PO) 1 capsule, Oral, Daily    pilocarpine (PILOCAR) 1 % ophthalmic solution No dose, route, or frequency recorded.       The following portions of the patient's history were reviewed and updated as appropriate: allergies, current medications, past family history, past medical history, past social history, past surgical history, and problem list.    Objective   Vital Signs:   /66 Comment: Vitals obtained per FERNANDO Goodman  Pulse 59   Temp 97.8 °F (36.6 °C)   Ht 160 cm (62.99\")   Wt 98.4 kg (217 lb)   SpO2 96%   BMI 38.45 kg/m²     Wt Readings from Last 3 Encounters:   09/27/24 98.4 kg (217 lb)   08/30/24 97.3 kg (214 lb 6.4 oz)   04/10/24 96.2 kg (212 lb)     BP Readings from Last 3 Encounters:   09/27/24 140/66   08/30/24 127/51   04/10/24 131/60     Physical Exam  Vitals and " nursing note reviewed.   Constitutional:       Appearance: Normal appearance. She is not ill-appearing.   HENT:      Head: Normocephalic and atraumatic.      Right Ear: External ear normal.      Left Ear: External ear normal.      Nose: Congestion present.   Cardiovascular:      Rate and Rhythm: Normal rate and regular rhythm.      Heart sounds: Normal heart sounds.   Pulmonary:      Effort: Pulmonary effort is normal.      Breath sounds: Normal breath sounds.   Skin:     General: Skin is warm and dry.   Neurological:      Mental Status: She is alert and oriented to person, place, and time.   Psychiatric:         Mood and Affect: Mood normal.          Result Review :  The following data was reviewed by: DIEGO Aiken on 09/27/2024:      Common labs          1/5/2024    11:03 8/30/2024    16:06   Common Labs   Glucose 130  126    BUN 19  18    Creatinine 0.65  0.56    Sodium 139  140    Potassium 4.4  3.7    Chloride 101  100    Calcium 9.3  9.6    Albumin 4.5  4.4    Total Bilirubin 0.7  0.4    Alkaline Phosphatase 98  93    AST (SGOT) 19  18    ALT (SGPT) 23  24    WBC 8.43  9.78    Hemoglobin 14.1  13.3    Hematocrit 41.2  39.2    Platelets 231  271    Total Cholesterol 178  162    Triglycerides 175  221    HDL Cholesterol 42  38    LDL Cholesterol  105  87        Lab Results (last 72 hours)       ** No results found for the last 72 hours. **             No Images in the past 120 days found..    Lab Results   Component Value Date    SARSANTIGEN Not Detected 01/12/2024    COVID19 DETECTED (A) 11/10/2020    FLUAAG Not Detected 01/12/2024    FLUBAG Not Detected 01/12/2024    RAPSCRN Negative 01/16/2024    BILIRUBINUR Negative 11/30/2023       Procedures        Assessment and Plan   Diagnoses and all orders for this visit:    1. Viral URI with cough (Primary)      Unable to perform ear exam today as there is no power in office and no available portable otoscope.  Patient advised to increase fluids,  continue her home medications and start Flonase or Nasacort over-the-counter as well as a sinus rinse with distilled water.  Patient advised that if ear trouble does not improve with medications she should follow-up for reevaluation.         There are no discontinued medications.       Follow Up   No follow-ups on file.  Patient was given instructions and counseling regarding her condition or for health maintenance advice. Please see specific information pulled into the AVS if appropriate.       DIEGO Aiken  09/30/24  10:58 EDT

## 2024-10-04 ENCOUNTER — OFFICE VISIT (OUTPATIENT)
Dept: FAMILY MEDICINE CLINIC | Age: 79
End: 2024-10-04
Payer: MEDICARE

## 2024-10-04 VITALS
TEMPERATURE: 98.3 F | DIASTOLIC BLOOD PRESSURE: 57 MMHG | WEIGHT: 215.4 LBS | OXYGEN SATURATION: 98 % | HEART RATE: 58 BPM | SYSTOLIC BLOOD PRESSURE: 148 MMHG | BODY MASS INDEX: 38.16 KG/M2 | HEIGHT: 63 IN

## 2024-10-04 DIAGNOSIS — J06.9 VIRAL URI WITH COUGH: Primary | ICD-10-CM

## 2024-10-04 DIAGNOSIS — H61.22 IMPACTED CERUMEN OF LEFT EAR: ICD-10-CM

## 2024-10-04 RX ORDER — PREDNISONE 20 MG/1
40 TABLET ORAL DAILY
Qty: 10 TABLET | Refills: 0 | Status: SHIPPED | OUTPATIENT
Start: 2024-10-04 | End: 2024-10-09

## 2024-10-04 RX ORDER — GUAIFENESIN 600 MG/1
600 TABLET, EXTENDED RELEASE ORAL 2 TIMES DAILY
Qty: 10 TABLET | Refills: 0 | Status: SHIPPED | OUTPATIENT
Start: 2024-10-04

## 2024-10-04 NOTE — PROGRESS NOTES
Procedure   Ear Cerumen Removal    Date/Time: 10/4/2024 1:54 PM    Performed by: Kirsten Sargent  Authorized by: Betsy Gonzalez APRN    Anesthesia:  Local Anesthetic: none  Ceruminolytics applied: Ceruminolytics applied prior to the procedure.  Location details: left ear  Patient tolerance: patient tolerated the procedure well with no immediate complications  Procedure type: irrigation   Sedation:  Patient sedated: no

## 2024-10-04 NOTE — PROGRESS NOTES
Chief Complaint  Cough (Cough, green mucus, Hoarseness, can't hear out of right ear x 2.5 weeks )    Subjective      Yanni Huynh is an 78 year old female that presents to [facility] with c/o green sinus mucous, cough, temp of 99 and right ear hearing difficulty. Symptoms started about 1.5 weeks ago.  She has been using flonse and states that has been helpful. She is also taking alkaseltzer that has dextromethorphan, phenylephrine and chlorpheniramine.   She is feeling better today and has not had as much mucous. Right ear is popping with blowing her nose. State she really only has a hard time hearing her thermometer beep. She does not notice hearing difficulty normally.       History of Present Illness    Current Outpatient Medications   Medication Instructions    amLODIPine (NORVASC) 2.5 mg, Oral, Daily    atenolol (TENORMIN) 25 MG tablet 1 tablet PO QAM and 1/4 tab PO QHS    Calcium Carb-Cholecalciferol (CALCIUM 600 + D PO) Oral, As Needed    escitalopram (LEXAPRO) 5 mg, Oral, Daily    fluorometholone (FML) 0.1 % ophthalmic suspension 1 drop, Both Eyes, Daily    guaiFENesin (MUCINEX) 600 mg, Oral, 2 Times Daily    LORazepam (ATIVAN) 1 MG tablet TAKE 1/2 TO 1 (ONE-HALF TO ONE) TABLET BY MOUTH NIGHTLY AS NEEDED FOR SLEEP    Melatonin 1.5 mg, Oral, Nightly    Misc Natural Products (CORTISOL MANAGER PO) Oral, Nightly    Multiple Vitamins-Minerals (PRESERVISION AREDS PO) 1 capsule, Oral, Daily    pilocarpine (PILOCAR) 1 % ophthalmic solution No dose, route, or frequency recorded.    predniSONE (DELTASONE) 40 mg, Oral, Daily       The following portions of the patient's history were reviewed and updated as appropriate: allergies, current medications, past family history, past medical history, past social history, past surgical history, and problem list.    Objective   Vital Signs:   /57 (BP Location: Left arm, Patient Position: Sitting, Cuff Size: Large Adult)   Pulse 58    "Temp 98.3 °F (36.8 °C) (Oral)   Ht 160 cm (62.99\")   Wt 97.7 kg (215 lb 6.4 oz)   SpO2 98%   BMI 38.17 kg/m²     Wt Readings from Last 3 Encounters:   10/04/24 97.7 kg (215 lb 6.4 oz)   09/27/24 98.4 kg (217 lb)   08/30/24 97.3 kg (214 lb 6.4 oz)     BP Readings from Last 3 Encounters:   10/04/24 148/57   09/27/24 140/66   08/30/24 127/51     Physical Exam  Vitals and nursing note reviewed.   Constitutional:       Appearance: Normal appearance. She is not ill-appearing.   HENT:      Head: Normocephalic and atraumatic.      Right Ear: Tympanic membrane, ear canal and external ear normal.      Left Ear: Ear canal and external ear normal. There is impacted cerumen.      Nose: Congestion present.      Mouth/Throat:      Mouth: Mucous membranes are moist.      Pharynx: No posterior oropharyngeal erythema.   Eyes:      Conjunctiva/sclera: Conjunctivae normal.      Pupils: Pupils are equal, round, and reactive to light.   Cardiovascular:      Rate and Rhythm: Normal rate and regular rhythm.      Heart sounds: Normal heart sounds.   Pulmonary:      Effort: Pulmonary effort is normal.      Breath sounds: Normal breath sounds.   Skin:     General: Skin is warm and dry.      Capillary Refill: Capillary refill takes less than 2 seconds.   Neurological:      Mental Status: She is alert and oriented to person, place, and time.   Psychiatric:         Mood and Affect: Mood normal.         Behavior: Behavior normal.          Result Review :  The following data was reviewed by: DIEGO Aiken on 10/04/2024:      Common labs          1/5/2024    11:03 8/30/2024    16:06   Common Labs   Glucose 130  126    BUN 19  18    Creatinine 0.65  0.56    Sodium 139  140    Potassium 4.4  3.7    Chloride 101  100    Calcium 9.3  9.6    Albumin 4.5  4.4    Total Bilirubin 0.7  0.4    Alkaline Phosphatase 98  93    AST (SGOT) 19  18    ALT (SGPT) 23  24    WBC 8.43  9.78    Hemoglobin 14.1  13.3    Hematocrit 41.2  39.2    Platelets " 231  271    Total Cholesterol 178  162    Triglycerides 175  221    HDL Cholesterol 42  38    LDL Cholesterol  105  87        Lab Results (last 72 hours)       Procedure Component Value Units Date/Time    POCT SARS-CoV-2 Antigen WHITNEY + Flu [[id number]] Collected: 10/04/24 1329    Specimen: Swab Updated: 10/04/24 1329     SARS Antigen Not Detected     Influenza A Antigen WHITNEY Not Detected     Influenza B Antigen WHITNEY Not Detected     Internal Control Passed     Lot Number [id number]     Expiration Date 12.13.2024             No Images in the past 120 days found..    Lab Results   Component Value Date    SARSANTIGEN Not Detected 10/04/2024    COVID19 DETECTED (A) 11/10/2020    FLUAAG Not Detected 10/04/2024    FLUBAG Not Detected 10/04/2024    RAPSCRN Negative 01/16/2024    BILIRUBINUR Negative 11/30/2023       Procedures        Assessment and Plan   Diagnoses and all orders for this visit:    1. Viral URI with cough (Primary)  -     POCT SARS-CoV-2 Antigen WHITNEY + Flu  -     predniSONE (DELTASONE) 20 MG tablet; Take 2 tablets by mouth Daily for 5 days.  Dispense: 10 tablet; Refill: 0  -     guaiFENesin (Mucinex) 600 MG 12 hr tablet; Take 1 tablet by mouth 2 (Two) Times a Day.  Dispense: 10 tablet; Refill: 0    2. Impacted cerumen of left ear  Comments:  resolved with irrigation. TM is clear with no sign of infection.  Orders:  -     Ear Cerumen Removal                  There are no discontinued medications.       Follow Up   No follow-ups on file.  Patient was given instructions and counseling regarding her condition or for health maintenance advice. Please see specific information pulled into the AVS if appropriate.       Betsy Gonzalez, DIEGO  10/04/24  15:03 EDT

## 2024-10-04 NOTE — PATIENT INSTRUCTIONS
Supportive care with rest, plenty of fluids, tylenol/ibuprofen for pain and/or fever as needed per label instructions.  You may find a cool-mist humidifier helpful.  Should you develop shortness of breath, chest pain or worsening of symptoms please go to the ER.

## 2024-10-08 ENCOUNTER — HOSPITAL ENCOUNTER (OUTPATIENT)
Dept: BONE DENSITY | Facility: HOSPITAL | Age: 79
Discharge: HOME | End: 2024-10-08
Admitting: FAMILY MEDICINE
Payer: MEDICARE

## 2024-10-08 DIAGNOSIS — Z78.0 POSTMENOPAUSAL STATE: ICD-10-CM

## 2024-10-08 PROCEDURE — 77080 DXA BONE DENSITY AXIAL: CPT

## 2024-11-11 DIAGNOSIS — F41.1 GENERALIZED ANXIETY DISORDER: ICD-10-CM

## 2024-11-12 RX ORDER — LORAZEPAM 1 MG/1
TABLET ORAL
Qty: 30 TABLET | Refills: 0 | Status: SHIPPED | OUTPATIENT
Start: 2024-11-12

## 2024-12-12 ENCOUNTER — OFFICE VISIT (OUTPATIENT)
Dept: FAMILY MEDICINE CLINIC | Age: 79
End: 2024-12-12
Payer: MEDICARE

## 2024-12-12 VITALS
HEIGHT: 63 IN | TEMPERATURE: 97.9 F | BODY MASS INDEX: 38.59 KG/M2 | HEART RATE: 54 BPM | DIASTOLIC BLOOD PRESSURE: 84 MMHG | OXYGEN SATURATION: 98 % | WEIGHT: 217.8 LBS | SYSTOLIC BLOOD PRESSURE: 146 MMHG

## 2024-12-12 DIAGNOSIS — Z94.7 CORNEAL TRANSPLANT STATUS: ICD-10-CM

## 2024-12-12 DIAGNOSIS — F51.01 PRIMARY INSOMNIA: ICD-10-CM

## 2024-12-12 DIAGNOSIS — Z79.899 HIGH RISK MEDICATION USE: ICD-10-CM

## 2024-12-12 DIAGNOSIS — E66.01 CLASS 2 SEVERE OBESITY DUE TO EXCESS CALORIES WITH SERIOUS COMORBIDITY AND BODY MASS INDEX (BMI) OF 38.0 TO 38.9 IN ADULT: ICD-10-CM

## 2024-12-12 DIAGNOSIS — M85.89 OSTEOPENIA OF MULTIPLE SITES: ICD-10-CM

## 2024-12-12 DIAGNOSIS — E66.812 CLASS 2 SEVERE OBESITY DUE TO EXCESS CALORIES WITH SERIOUS COMORBIDITY AND BODY MASS INDEX (BMI) OF 38.0 TO 38.9 IN ADULT: ICD-10-CM

## 2024-12-12 DIAGNOSIS — I10 ESSENTIAL (PRIMARY) HYPERTENSION: ICD-10-CM

## 2024-12-12 DIAGNOSIS — F41.1 GENERALIZED ANXIETY DISORDER: Primary | ICD-10-CM

## 2024-12-12 PROCEDURE — 1126F AMNT PAIN NOTED NONE PRSNT: CPT | Performed by: FAMILY MEDICINE

## 2024-12-12 PROCEDURE — 3077F SYST BP >= 140 MM HG: CPT | Performed by: FAMILY MEDICINE

## 2024-12-12 PROCEDURE — 99214 OFFICE O/P EST MOD 30 MIN: CPT | Performed by: FAMILY MEDICINE

## 2024-12-12 PROCEDURE — 1159F MED LIST DOCD IN RCRD: CPT | Performed by: FAMILY MEDICINE

## 2024-12-12 PROCEDURE — 1160F RVW MEDS BY RX/DR IN RCRD: CPT | Performed by: FAMILY MEDICINE

## 2024-12-12 PROCEDURE — G2211 COMPLEX E/M VISIT ADD ON: HCPCS | Performed by: FAMILY MEDICINE

## 2024-12-12 PROCEDURE — 3078F DIAST BP <80 MM HG: CPT | Performed by: FAMILY MEDICINE

## 2024-12-12 RX ORDER — LORAZEPAM 1 MG/1
TABLET ORAL
Qty: 30 TABLET | Refills: 2 | Status: SHIPPED | OUTPATIENT
Start: 2024-12-12

## 2024-12-12 RX ORDER — IBUPROFEN 200 MG
200 TABLET ORAL EVERY 6 HOURS PRN
COMMUNITY

## 2024-12-12 RX ORDER — AMLODIPINE BESYLATE 2.5 MG/1
2.5 TABLET ORAL DAILY
Qty: 90 TABLET | Refills: 1 | Status: SHIPPED | OUTPATIENT
Start: 2024-12-12

## 2024-12-12 NOTE — ASSESSMENT & PLAN NOTE
Blood pressure has been running at goal.  Continue amlodipine 2.5 mg daily.  Refills were needed today.  Labs were not needed today.  Continue to monitor.  Orders:    amLODIPine (NORVASC) 2.5 MG tablet; Take 1 tablet by mouth Daily.

## 2024-12-12 NOTE — ASSESSMENT & PLAN NOTE
DEXA done 10/2024 showed osteopenia with FRAX score indicating she is a candidate for meds. After discussion of risks and benefits, she elects to hold off for now. She is going to try to be more consistent with taking her calcium.

## 2024-12-12 NOTE — PROGRESS NOTES
"Chief Complaint  Anxiety    Subjective          Yanni Huynh presents to [facility] today for follow-up on chronic issues.    She had her DEXA scan done 10/2024 and this came back showing osteopenia that does meet criteria for medication per FRAX score.    She is on lorazepam, melatonin and escitalopram for anxiety and insomnia.  Mood has been \"okay.\"   she denies panic attacks and baseline anxiety is well-controlled.  She has previously followed with Dr. Demetrius Romero for sleep therapy (2015) which allowed her to significantly decrease the amount of lorazepam she was using but she was unable to decrease her bedtime dose lower than 1 mg.  She has tried trazodone and Ambien previously but had intolerable side effects with both.      She is on amlodipine and atenolol for HTN.  Her BP has been well controlled.  No chest pain, palpitations, or shortness of breath.      She has a lot of aches and pains.  She has some back pain and L shoulder pain.  She has followed with Dr. Dela Cruz for the L shoulder and he recommended shoulder replacement.  She has been hesitant to do that.       She is following with Dr. Gallardo [facility] for macular degeneration.  S/p corneal transplant.      Current Outpatient Medications:     amLODIPine (NORVASC) 2.5 MG tablet, Take 1 tablet by mouth Daily., Disp: 90 tablet, Rfl: 1    atenolol (TENORMIN) 25 MG tablet, 1 tablet PO QAM and 1/4 tab PO QHS, Disp: 135 tablet, Rfl: 1    Calcium Carb-Cholecalciferol (CALCIUM 600 + D PO), Take  by mouth As Needed., Disp: , Rfl:     escitalopram (LEXAPRO) 10 MG tablet, Take 0.5 tablets by mouth Daily., Disp: 45 tablet, Rfl: 1    fluorometholone (FML) 0.1 % ophthalmic suspension, Administer 1 drop to both eyes Daily., Disp: , Rfl:     ibuprofen (ADVIL,MOTRIN) 200 MG tablet, Take 1 tablet by mouth Every 6 (Six) Hours As Needed for Mild Pain., Disp: , Rfl:     LORazepam (ATIVAN) 1 MG tablet, TAKE 1/2 TO 1 (ONE-HALF TO ONE) " "TABLET BY MOUTH AT BEDTIME AS NEEDED FOR SLEEP, Disp: 30 tablet, Rfl: 2    Melatonin 1 MG capsule, Take 1.5 mg by mouth Every Night., Disp: , Rfl:     Misc Natural Products (CORTISOL MANAGER PO), Take  by mouth Every Night., Disp: , Rfl:     Multiple Vitamins-Minerals (PRESERVISION AREDS PO), Take 1 tablet by mouth Daily., Disp: , Rfl:     pilocarpine (PILOCAR) 1 % ophthalmic solution, , Disp: , Rfl:   Medications Discontinued During This Encounter   Medication Reason    guaiFENesin (Mucinex) 600 MG 12 hr tablet *Therapy completed    amLODIPine (NORVASC) 2.5 MG tablet Reorder    LORazepam (ATIVAN) 1 MG tablet Reorder         Allergies:  Patient has no known allergies.      Objective   Vital Signs:   Vitals:    12/12/24 1407 12/12/24 1441   BP: 148/67 146/84   BP Location: Left arm    Patient Position: Sitting    Cuff Size: Large Adult    Pulse: 54    Temp: 97.9 °F (36.6 °C)    TempSrc: Oral    SpO2: 98%    Weight: 98.8 kg (217 lb 12.8 oz)    Height: 160 cm (62.99\")      Body mass index is 38.59 kg/m².           Physical Exam  Vitals reviewed.   Constitutional:       General: She is not in acute distress.     Appearance: Normal appearance. She is well-developed.   HENT:      Head: Normocephalic and atraumatic.      Right Ear: External ear normal.      Left Ear: External ear normal.   Eyes:      Extraocular Movements: Extraocular movements intact.      Conjunctiva/sclera: Conjunctivae normal.      Pupils: Pupils are equal, round, and reactive to light.   Cardiovascular:      Rate and Rhythm: Normal rate and regular rhythm.      Heart sounds: No murmur heard.  Pulmonary:      Effort: Pulmonary effort is normal.      Breath sounds: Normal breath sounds. No wheezing, rhonchi or rales.   Abdominal:      General: Bowel sounds are normal. There is no distension.      Palpations: Abdomen is soft.      Tenderness: There is no abdominal tenderness.      Hernia: There is no hernia in the left inguinal area or right inguinal " area.   Genitourinary:     General: Normal vulva.      Pubic Area: No rash.       Labia:         Right: No rash, tenderness, lesion or injury.         Left: No rash, tenderness, lesion or injury.       Urethra: No prolapse, urethral pain, urethral swelling or urethral lesion.      Vagina: Normal.      Uterus: Absent.    Musculoskeletal:         General: Normal range of motion.   Neurological:      Mental Status: She is alert.   Psychiatric:         Mood and Affect: Affect normal.           Lab Results   Component Value Date    GLUCOSE 126 (H) 08/30/2024    BUN 18 08/30/2024    CREATININE 0.56 (L) 08/30/2024    EGFRIFNONA 97 01/13/2022    BCR 32.1 (H) 08/30/2024    K 3.7 08/30/2024    CO2 28.0 08/30/2024    CALCIUM 9.6 08/30/2024    ALBUMIN 4.4 08/30/2024    LABIL2 1.3 (L) 03/01/2021    AST 18 08/30/2024    ALT 24 08/30/2024       Lab Results   Component Value Date    CHOL 162 08/30/2024    CHLPL 185 03/01/2021    TRIG 221 (H) 08/30/2024    HDL 38 (L) 08/30/2024    LDL 87 08/30/2024       Lab Results   Component Value Date    WBC 9.78 08/30/2024    HGB 13.3 08/30/2024    HCT 39.2 08/30/2024    MCV 86.0 08/30/2024     08/30/2024            Assessment and Plan    Assessment & Plan  Generalized anxiety disorder  Stable on current regimen.  Symptoms are well controlled.  No adverse effects. She does require ongoing use of this controlled substance to function.  Tox screen was due today.  Prior tox screen appropriate.  OSWALDO was run today.  Refills were needed today.  RTC 3 months.  Orders:    LORazepam (ATIVAN) 1 MG tablet; TAKE 1/2 TO 1 (ONE-HALF TO ONE) TABLET BY MOUTH AT BEDTIME AS NEEDED FOR SLEEP    Primary insomnia  As per anxiety plan.       High risk medication use    Orders:    POC Medline 12 Panel Urine Drug Screen    Essential (primary) hypertension  Blood pressure has been running at goal.  Continue amlodipine 2.5 mg daily.  Refills were needed today.  Labs were not needed today.  Continue to  monitor.  Orders:    amLODIPine (NORVASC) 2.5 MG tablet; Take 1 tablet by mouth Daily.    Osteopenia of multiple sites  DEXA done 10/2024 showed osteopenia with FRAX score indicating she is a candidate for meds. After discussion of risks and benefits, she elects to hold off for now. She is going to try to be more consistent with taking her calcium.        Class 2 severe obesity due to excess calories with serious comorbidity and body mass index (BMI) of 38.0 to 38.9 in adult  Patient's (Body mass index is 38.59 kg/m².) indicates that they are morbidly/severely obese (BMI > 40 or > 35 with obesity - related health condition) with health conditions that include hypertension . Weight is unchanged. BMI  is above average; BMI management plan is completed. We discussed portion control and increasing exercise.          Corneal transplant status  Following with Dr. Gallardo.           Follow Up   Return in about 3 months (around 3/12/2025) for Recheck.  Patient was given instructions and counseling regarding her condition or for health maintenance advice. Please see specific information pulled into the AVS if appropriate.           12/12/2024

## 2024-12-12 NOTE — ASSESSMENT & PLAN NOTE
Stable on current regimen.  Symptoms are well controlled.  No adverse effects. She does require ongoing use of this controlled substance to function.  Tox screen was due today.  Prior tox screen appropriate.  OSWALDO was run today.  Refills were needed today.  RTC 3 months.  Orders:    LORazepam (ATIVAN) 1 MG tablet; TAKE 1/2 TO 1 (ONE-HALF TO ONE) TABLET BY MOUTH AT BEDTIME AS NEEDED FOR SLEEP

## 2024-12-28 ENCOUNTER — HOSPITAL ENCOUNTER (EMERGENCY)
Facility: HOSPITAL | Age: 79
Discharge: HOME | End: 2024-12-28
Attending: EMERGENCY MEDICINE
Payer: MEDICARE

## 2024-12-28 ENCOUNTER — APPOINTMENT (OUTPATIENT)
Dept: GENERAL RADIOLOGY | Facility: HOSPITAL | Age: 79
End: 2024-12-28
Payer: MEDICARE

## 2024-12-28 VITALS
WEIGHT: 223.33 LBS | DIASTOLIC BLOOD PRESSURE: 67 MMHG | HEART RATE: 75 BPM | TEMPERATURE: 97.4 F | HEIGHT: 63 IN | SYSTOLIC BLOOD PRESSURE: 146 MMHG | BODY MASS INDEX: 39.57 KG/M2 | OXYGEN SATURATION: 91 % | RESPIRATION RATE: 20 BRPM

## 2024-12-28 DIAGNOSIS — J10.1 INFLUENZA A: Primary | ICD-10-CM

## 2024-12-28 LAB
BILIRUB UR QL STRIP: NEGATIVE
CLARITY UR: CLEAR
COLOR UR: YELLOW
FLUAV SUBTYP SPEC NAA+PROBE: DETECTED
FLUBV RNA ISLT QL NAA+PROBE: NOT DETECTED
GLUCOSE UR STRIP-MCNC: NEGATIVE MG/DL
HGB UR QL STRIP.AUTO: NEGATIVE
KETONES UR QL STRIP: ABNORMAL
LEUKOCYTE ESTERASE UR QL STRIP.AUTO: NEGATIVE
NITRITE UR QL STRIP: NEGATIVE
PH UR STRIP.AUTO: 6 [PH] (ref 5–8)
PROT UR QL STRIP: NEGATIVE
RSV RNA NPH QL NAA+NON-PROBE: NOT DETECTED
S PYO AG THROAT QL: NEGATIVE
SARS-COV-2 RNA RESP QL NAA+PROBE: NOT DETECTED
SP GR UR STRIP: 1.01 (ref 1–1.03)
UROBILINOGEN UR QL STRIP: ABNORMAL

## 2024-12-28 PROCEDURE — 81003 URINALYSIS AUTO W/O SCOPE: CPT | Performed by: EMERGENCY MEDICINE

## 2024-12-28 PROCEDURE — 87880 STREP A ASSAY W/OPTIC: CPT

## 2024-12-28 PROCEDURE — 99283 EMERGENCY DEPT VISIT LOW MDM: CPT

## 2024-12-28 PROCEDURE — 63710000001 PREDNISONE PER 5 MG: Performed by: EMERGENCY MEDICINE

## 2024-12-28 PROCEDURE — 71045 X-RAY EXAM CHEST 1 VIEW: CPT

## 2024-12-28 PROCEDURE — 25810000003 SODIUM CHLORIDE 0.9 % SOLUTION: Performed by: EMERGENCY MEDICINE

## 2024-12-28 PROCEDURE — 87637 SARSCOV2&INF A&B&RSV AMP PRB: CPT | Performed by: EMERGENCY MEDICINE

## 2024-12-28 PROCEDURE — 94799 UNLISTED PULMONARY SVC/PX: CPT

## 2024-12-28 PROCEDURE — 94640 AIRWAY INHALATION TREATMENT: CPT

## 2024-12-28 PROCEDURE — 87081 CULTURE SCREEN ONLY: CPT | Performed by: EMERGENCY MEDICINE

## 2024-12-28 RX ORDER — OSELTAMIVIR PHOSPHATE 75 MG/1
75 CAPSULE ORAL ONCE
Status: COMPLETED | OUTPATIENT
Start: 2024-12-28 | End: 2024-12-28

## 2024-12-28 RX ORDER — OSELTAMIVIR PHOSPHATE 75 MG/1
75 CAPSULE ORAL 2 TIMES DAILY
Qty: 10 CAPSULE | Refills: 0 | Status: SHIPPED | OUTPATIENT
Start: 2024-12-28 | End: 2025-01-02

## 2024-12-28 RX ORDER — IPRATROPIUM BROMIDE AND ALBUTEROL SULFATE 2.5; .5 MG/3ML; MG/3ML
3 SOLUTION RESPIRATORY (INHALATION) ONCE
Status: COMPLETED | OUTPATIENT
Start: 2024-12-28 | End: 2024-12-28

## 2024-12-28 RX ORDER — PREDNISONE 50 MG/1
50 TABLET ORAL DAILY
Qty: 4 TABLET | Refills: 0 | Status: SHIPPED | OUTPATIENT
Start: 2024-12-28

## 2024-12-28 RX ORDER — SODIUM CHLORIDE 0.9 % (FLUSH) 0.9 %
10 SYRINGE (ML) INJECTION AS NEEDED
Status: DISCONTINUED | OUTPATIENT
Start: 2024-12-28 | End: 2024-12-28 | Stop reason: HOSPADM

## 2024-12-28 RX ADMIN — OSELTAMIVIR PHOSPHATE 75 MG: 75 CAPSULE ORAL at 03:48

## 2024-12-28 RX ADMIN — IPRATROPIUM BROMIDE AND ALBUTEROL SULFATE 3 ML: .5; 3 SOLUTION RESPIRATORY (INHALATION) at 05:13

## 2024-12-28 RX ADMIN — SODIUM CHLORIDE 1000 ML: 9 INJECTION, SOLUTION INTRAVENOUS at 03:47

## 2024-12-28 RX ADMIN — PREDNISONE 60 MG: 50 TABLET ORAL at 06:17

## 2024-12-28 NOTE — ED PROVIDER NOTES
Time: 4:40 AM EST  Date of encounter:  12/28/2024  Independent Historian/Clinical History and Information was obtained by:   Patient    History is limited by: N/A    Chief Complaint: Fever and cough      History of Present Illness:  Patient is a 79 y.o. year old female who presents to the emergency department for evaluation of fever and cough    Patient describes approximately 3 days of fevers whole body achiness mild diarrhea mild urinary frequency and cough.  She is attempted over-the-counter medication and previously prescribed medications without relief.      Patient Care Team  Primary Care Provider: Maria E Melgar MD    Past Medical History:     No Known Allergies  Past Medical History:   Diagnosis Date    Allergic rhinitis     Anxiety     Asymptomatic menopausal state     Contusion of right knee     Fracture of ankle     History of falling     Hypertension     Keratoconus     s/p 3 cornea transplants    Laryngeal spasm     Low back pain     Mitral valve prolapse     Other long term (current) drug therapy     Pain in right knee     Primary insomnia     Sprain of left rotator cuff capsule      Past Surgical History:   Procedure Laterality Date    BREAST BIOPSY      CHOLECYSTECTOMY      COLON SURGERY      CORNEAL TRANSPLANT      x3    HERNIA REPAIR      HYSTERECTOMY      ORIF ANKLE FRACTURE Right      Family History   Problem Relation Age of Onset    Heart disease Mother     Heart attack Mother     Hypertension Mother     Coronary artery disease Mother     Lung disease Father        Home Medications:  Prior to Admission medications    Medication Sig Start Date End Date Taking? Authorizing Provider   amLODIPine (NORVASC) 2.5 MG tablet Take 1 tablet by mouth Daily. 12/12/24   Maria E Melgar MD   atenolol (TENORMIN) 25 MG tablet 1 tablet PO QAM and 1/4 tab PO QHS 8/30/24   Maria E Melgar MD   Calcium Carb-Cholecalciferol (CALCIUM 600 + D PO) Take  by mouth As Needed.    Provider,  MD Yasmani   escitalopram (LEXAPRO) 10 MG tablet Take 0.5 tablets by mouth Daily. 8/30/24   Maria E Melgar MD   fluorometholone (FML) 0.1 % ophthalmic suspension Administer 1 drop to both eyes Daily. 6/23/21   Yasmani Aponte MD   ibuprofen (ADVIL,MOTRIN) 200 MG tablet Take 1 tablet by mouth Every 6 (Six) Hours As Needed for Mild Pain.    Yasmani Aponte MD   LORazepam (ATIVAN) 1 MG tablet TAKE 1/2 TO 1 (ONE-HALF TO ONE) TABLET BY MOUTH AT BEDTIME AS NEEDED FOR SLEEP 12/12/24   Maria E Melgar MD   Melatonin 1 MG capsule Take 1.5 mg by mouth Every Night.    Yasmani Aponte MD   Misc Natural Products (CORTISOL MANAGER PO) Take  by mouth Every Night.    Yasmani Aponte MD   Multiple Vitamins-Minerals (PRESERVISION AREDS PO) Take 1 tablet by mouth Daily.    Yasmani Aponte MD   pilocarpine (PILOCAR) 1 % ophthalmic solution  5/2/17   Yasmani Aponte MD        Social History:   Social History     Tobacco Use    Smoking status: Never     Passive exposure: Past    Smokeless tobacco: Never   Vaping Use    Vaping status: Never Used   Substance Use Topics    Alcohol use: No    Drug use: Defer         Review of Systems:  Review of Systems   Constitutional:  Positive for fatigue and fever. Negative for chills.   HENT:  Positive for congestion. Negative for ear pain and sore throat.    Eyes:  Negative for pain.   Respiratory:  Positive for cough. Negative for chest tightness and shortness of breath.    Cardiovascular:  Negative for chest pain.   Gastrointestinal:  Negative for abdominal pain, diarrhea, nausea and vomiting.   Genitourinary:  Negative for flank pain and hematuria.   Musculoskeletal:  Negative for joint swelling.   Skin:  Negative for pallor.   Neurological:  Positive for weakness. Negative for seizures and headaches.   All other systems reviewed and are negative.       Physical Exam:  /67 (Patient Position: Lying)   Pulse 75   Temp 97.4 °F (36.3  "°C) (Oral)   Resp 20   Ht 160 cm (63\")   Wt 101 kg (223 lb 5.2 oz)   SpO2 91%   BMI 39.56 kg/m²     Physical Exam  Vitals and nursing note reviewed.   Constitutional:       General: She is not in acute distress.     Appearance: Normal appearance. She is not toxic-appearing.   HENT:      Head: Normocephalic and atraumatic.      Jaw: There is normal jaw occlusion.   Eyes:      General: Lids are normal.      Extraocular Movements: Extraocular movements intact.      Conjunctiva/sclera: Conjunctivae normal.      Pupils: Pupils are equal, round, and reactive to light.   Cardiovascular:      Rate and Rhythm: Normal rate and regular rhythm.      Pulses: Normal pulses.      Heart sounds: Normal heart sounds.   Pulmonary:      Effort: Pulmonary effort is normal. No respiratory distress.      Breath sounds: Normal breath sounds. No wheezing or rhonchi.   Abdominal:      General: Abdomen is flat.      Palpations: Abdomen is soft.      Tenderness: There is no abdominal tenderness. There is no guarding or rebound.   Musculoskeletal:         General: Normal range of motion.      Cervical back: Normal range of motion and neck supple.      Right lower leg: No edema.      Left lower leg: No edema.   Skin:     General: Skin is warm and dry.   Neurological:      Mental Status: She is alert and oriented to person, place, and time. Mental status is at baseline.   Psychiatric:         Mood and Affect: Mood normal.            Medical Decision Making:      Comorbidities that affect care:    Hypertension, mitral valve prolapse, anxiety,    External Notes reviewed:    Previous Clinic Note: Outpatient primary care visit for generalized anxiety 12/12/2024      The following orders were placed and all results were independently analyzed by me:  Orders Placed This Encounter   Procedures    Rapid Strep A Screen - Swab, Throat    COVID-19, FLU A/B, RSV PCR 1 HR TAT - Swab, Nasopharynx    Beta Strep Culture, Throat - Swab, Throat    XR Chest 1 " View    Urinalysis With Culture If Indicated - Urine, Clean Catch    Insert Peripheral IV       Medications Given in the Emergency Department:  Medications   sodium chloride 0.9 % flush 10 mL (has no administration in time range)   sodium chloride 0.9 % bolus 1,000 mL (0 mL Intravenous Stopped 12/28/24 0417)   oseltamivir (TAMIFLU) capsule 75 mg (75 mg Oral Given 12/28/24 0348)   ipratropium-albuterol (DUO-NEB) nebulizer solution 3 mL (3 mL Nebulization Given 12/28/24 0513)   predniSONE (DELTASONE) tablet 60 mg (60 mg Oral Given 12/28/24 0617)        ED Course:         Labs:    Lab Results (last 24 hours)       Procedure Component Value Units Date/Time    Rapid Strep A Screen - Swab, Throat [[id number]]  (Normal) Collected: 12/28/24 0119    Specimen: Swab from Throat Updated: 12/28/24 0147     Strep A Ag Negative    COVID-19, FLU A/B, RSV PCR 1 HR TAT - Swab, Nasopharynx [[id number]]  (Abnormal) Collected: 12/28/24 0119    Specimen: Swab from Nasopharynx Updated: 12/28/24 0317     COVID19 Not Detected     Influenza A PCR Detected     Influenza B PCR Not Detected     RSV, PCR Not Detected    Narrative:      Fact sheet for providers: https://www.fda.gov/media/370331/download    Fact sheet for patients: https://www.fda.gov/media/869191/download    Test performed by PCR.    Beta Strep Culture, Throat - Swab, Throat [[id number]] Collected: 12/28/24 0119    Specimen: Swab from Throat Updated: 12/28/24 0147    Urinalysis With Culture If Indicated - Urine, Clean Catch [[id number]]  (Abnormal) Collected: 12/28/24 0517    Specimen: Urine, Clean Catch Updated: 12/28/24 0528     Color, UA Yellow     Appearance, UA Clear     pH, UA 6.0     Specific Gravity, UA 1.011     Glucose, UA Negative     Ketones, UA Trace     Bilirubin, UA Negative     Blood, UA Negative     Protein, UA Negative     Leuk Esterase, UA Negative     Nitrite, UA Negative     Urobilinogen, UA 0.2 E.U./dL    Narrative:      In absence of clinical symptoms, the  presence of pyuria, bacteria, and/or nitrites on the urinalysis result does not correlate with infection.  Urine microscopic not indicated.             Imaging:    XR Chest 1 View    Result Date: 12/28/2024  XR CHEST 1 VW-  Date of exam: 12/28/2024, 2:35 A.M.  Indications: fever; cough; SOA/SOB/shortness of air/shortness of breath  Comparisons: 10/7/2022; 1/13/2022.  FINDINGS: A single frontal (AP semi-upright portable) chest radiograph reveals borderline cardiac enlargement and no acute infiltrate. No pneumothorax is seen. No pleural effusion. External artifacts obscure detail. The thoracic aorta is atherosclerotic and ectatic. There may be chronic calcified granulomatous disease of the chest. Degenerative changes involve the shoulders and spine. There may be mild scoliosis involving the thoracic spine. There is pulmonary hypoinflation.       No acute infiltrate is suggested.    Portions of this note were completed with a voice recognition program.  Electronically Signed By-Elvin Salazar MD On:12/28/2024 2:53 AM         Differential Diagnosis and Discussion:    Viral syndrome: Differential diagnosis includes but is not limited to influenza, common cold, COVID-19, RSV, adenovirus, enteroviruses, herpes virus, hepatitis virus, measles, mumps, rubella, dengue fever, and possible bacterial infection.    PROCEDURES:    Labs were collected in the emergency department and all labs were reviewed and interpreted by me.  X-ray were performed in the emergency department and all X-ray impressions were independently interpreted by me.    No orders to display       Procedures    MDM                     Patient Care Considerations:    ANTIBIOTICS: I considered prescribing antibiotics as an outpatient however no bacterial focus of infection was found.      Consultants/Shared Management Plan:    None    Social Determinants of Health:    Patient has presented with family members who are responsible, reliable and will ensure follow  up care.      Disposition and Care Coordination:    Discharged: The patient is suitable and stable for discharge with no need for consideration of admission.    I have explained the patient´s condition, diagnoses and treatment plan based on the information available to me at this time. I have answered questions and addressed any concerns. The patient has a good  understanding of the patient´s diagnosis, condition, and treatment plan as can be expected at this point. The vital signs have been stable. The patient´s condition is stable and appropriate for discharge from the emergency department.      The patient will pursue further outpatient evaluation with the primary care physician or other designated or consulting physician as outlined in the discharge instructions. They are agreeable to this plan of care and follow-up instructions have been explained in detail. The patient has received these instructions in written format and has expressed an understanding of the discharge instructions. The patient is aware that any significant change in condition or worsening of symptoms should prompt an immediate return to this or the closest emergency department or call to 911.    Final diagnoses:   Influenza A        ED Disposition       ED Disposition   Discharge    Condition   Stable    Comment   --               This medical record created using voice recognition software.             Jarred Oneil MD  12/28/24 [id number]

## 2024-12-28 NOTE — ED TRIAGE NOTES
Pt comes to the ER tonight for fever, congestion, cough, and shortness of breath since Wednesday.

## 2024-12-30 ENCOUNTER — OFFICE VISIT (OUTPATIENT)
Dept: FAMILY MEDICINE CLINIC | Age: 79
End: 2024-12-30
Payer: MEDICARE

## 2024-12-30 VITALS
DIASTOLIC BLOOD PRESSURE: 67 MMHG | TEMPERATURE: 97.9 F | BODY MASS INDEX: 39.69 KG/M2 | OXYGEN SATURATION: 95 % | WEIGHT: 224 LBS | HEIGHT: 63 IN | HEART RATE: 67 BPM | SYSTOLIC BLOOD PRESSURE: 153 MMHG

## 2024-12-30 DIAGNOSIS — J10.1 INFLUENZA A: Primary | ICD-10-CM

## 2024-12-30 DIAGNOSIS — R05.1 ACUTE COUGH: ICD-10-CM

## 2024-12-30 LAB — BACTERIA SPEC AEROBE CULT: NORMAL

## 2024-12-30 PROCEDURE — 1126F AMNT PAIN NOTED NONE PRSNT: CPT | Performed by: NURSE PRACTITIONER

## 2024-12-30 PROCEDURE — 99213 OFFICE O/P EST LOW 20 MIN: CPT | Performed by: NURSE PRACTITIONER

## 2024-12-30 PROCEDURE — 3078F DIAST BP <80 MM HG: CPT | Performed by: NURSE PRACTITIONER

## 2024-12-30 PROCEDURE — 3077F SYST BP >= 140 MM HG: CPT | Performed by: NURSE PRACTITIONER

## 2024-12-30 RX ORDER — ALBUTEROL SULFATE 0.83 MG/ML
2.5 SOLUTION RESPIRATORY (INHALATION) EVERY 4 HOURS PRN
Qty: 24 EACH | Refills: 1 | Status: SHIPPED | OUTPATIENT
Start: 2024-12-30

## 2024-12-31 NOTE — PROGRESS NOTES
"Chief Complaint  Yanni Huynh presents to [facility] for Influenza (Type A) and Shortness of Breath    Subjective     Influenza  Shortness of Breath        Yanni is here to follow-up after recent diagnosis of influenza type A.  She reports that she has been having shortness of breath after long periods of coughing.  She does not feel that the shortness of breath is unexpected however she is concerned that her coughing is persistent.  She denies any recent fevers.  Denies any productive cough.  Assessment and Plan     Diagnoses and all orders for this visit:    1. Influenza A (Primary)  Comments:  No red flags on exam today.  Oxygen level is improved and she does feel better than her visit at the ER.  Follow-up as needed  Orders:  -     albuterol (PROVENTIL) (2.5 MG/3ML) 0.083% nebulizer solution; Take 2.5 mg by nebulization Every 4 (Four) Hours As Needed for Wheezing.  Dispense: 24 each; Refill: 1  -     Home Nebulizer    2. Acute cough  Comments:  Advised her to take Mucinex, increase water intake, and nebulizer treatments provided.  Follow-up if new or worsening symptoms            Follow Up   Return if symptoms worsen or fail to improve.  Future Appointments   Date Time Provider Department Center   3/19/2025  2:15 PM Maria E Melgar MD [facility]       New Medications Ordered This Visit   Medications    albuterol (PROVENTIL) (2.5 MG/3ML) 0.083% nebulizer solution     Sig: Take 2.5 mg by nebulization Every 4 (Four) Hours As Needed for Wheezing.     Dispense:  24 each     Refill:  1       There are no discontinued medications.       Review of Systems   Respiratory:  Positive for shortness of breath.        Objective     Vitals:    12/30/24 1549   BP: 153/67   BP Location: Left arm   Patient Position: Sitting   Cuff Size: Large Adult   Pulse: 67   Temp: 97.9 °F (36.6 °C)   TempSrc: Oral   SpO2: 95%   Weight: 102 kg (224 lb)   Height: 160 cm (62.99\")      "       Physical Exam  Constitutional:       General: She is not in acute distress.     Appearance: Normal appearance.   HENT:      Head: Normocephalic.   Cardiovascular:      Rate and Rhythm: Normal rate and regular rhythm.   Pulmonary:      Effort: Pulmonary effort is normal.      Breath sounds: Normal breath sounds. No decreased breath sounds, wheezing, rhonchi or rales.   Musculoskeletal:         General: Normal range of motion.   Neurological:      General: No focal deficit present.      Mental Status: She is alert and oriented to person, place, and time.   Psychiatric:         Mood and Affect: Mood normal.         Behavior: Behavior normal.               Result Review        No Known Allergies   Past Medical History:   Diagnosis Date    Allergic rhinitis     Anxiety     Asymptomatic menopausal state     Contusion of right knee     Fracture of ankle     History of falling     Hypertension     Keratoconus     s/p 3 cornea transplants    Laryngeal spasm     Low back pain     Mitral valve prolapse     Other long term (current) drug therapy     Pain in right knee     Primary insomnia     Sprain of left rotator cuff capsule      Current Outpatient Medications   Medication Sig Dispense Refill    amLODIPine (NORVASC) 2.5 MG tablet Take 1 tablet by mouth Daily. 90 tablet 1    atenolol (TENORMIN) 25 MG tablet 1 tablet PO QAM and 1/4 tab PO  tablet 1    Calcium Carb-Cholecalciferol (CALCIUM 600 + D PO) Take  by mouth As Needed.      escitalopram (LEXAPRO) 10 MG tablet Take 0.5 tablets by mouth Daily. 45 tablet 1    fluorometholone (FML) 0.1 % ophthalmic suspension Administer 1 drop to both eyes Daily.      ibuprofen (ADVIL,MOTRIN) 200 MG tablet Take 1 tablet by mouth Every 6 (Six) Hours As Needed for Mild Pain.      LORazepam (ATIVAN) 1 MG tablet TAKE 1/2 TO 1 (ONE-HALF TO ONE) TABLET BY MOUTH AT BEDTIME AS NEEDED FOR SLEEP 30 tablet 2    Melatonin 1 MG capsule Take 1.5 mg by mouth Every Night.      Misc Natural  Products (CORTISOL MANAGER PO) Take  by mouth Every Night.      Multiple Vitamins-Minerals (PRESERVISION AREDS PO) Take 1 tablet by mouth Daily.      oseltamivir (Tamiflu) 75 MG capsule Take 1 capsule by mouth 2 (Two) Times a Day for 5 days. 10 capsule 0    pilocarpine (PILOCAR) 1 % ophthalmic solution       predniSONE (DELTASONE) 50 MG tablet Take 1 tablet by mouth Daily. 4 tablet 0    albuterol (PROVENTIL) (2.5 MG/3ML) 0.083% nebulizer solution Take 2.5 mg by nebulization Every 4 (Four) Hours As Needed for Wheezing. 24 each 1     No current facility-administered medications for this visit.     Past Surgical History:   Procedure Laterality Date    BREAST BIOPSY      CHOLECYSTECTOMY      COLON SURGERY      CORNEAL TRANSPLANT      x3    HERNIA REPAIR      HYSTERECTOMY      ORIF ANKLE FRACTURE Right       Health Maintenance Due   Topic Date Due    ZOSTER VACCINE (1 of 2) Never done    COVID-19 Vaccine (2 - Angel risk series) 06/04/2021      Immunization History   Administered Date(s) Administered    COVID-19 (ANGEL) 05/07/2021    Td (TDVAX) 01/01/2019         Part of this note may be an electronic transcription/translation of spoken language to printed   text using the Dragon Dictation System.      DIEGO Santos

## 2025-01-08 ENCOUNTER — OFFICE VISIT (OUTPATIENT)
Dept: FAMILY MEDICINE CLINIC | Age: 80
End: 2025-01-08
Payer: MEDICARE

## 2025-01-08 ENCOUNTER — HOSPITAL ENCOUNTER (OUTPATIENT)
Dept: GENERAL RADIOLOGY | Facility: HOSPITAL | Age: 80
Discharge: HOME | End: 2025-01-08
Admitting: STUDENT IN AN ORGANIZED HEALTH CARE EDUCATION/TRAINING PROGRAM
Payer: MEDICARE

## 2025-01-08 VITALS
SYSTOLIC BLOOD PRESSURE: 132 MMHG | HEART RATE: 73 BPM | TEMPERATURE: 98.4 F | BODY MASS INDEX: 38.09 KG/M2 | HEIGHT: 63 IN | OXYGEN SATURATION: 96 % | DIASTOLIC BLOOD PRESSURE: 84 MMHG | WEIGHT: 215 LBS

## 2025-01-08 DIAGNOSIS — R05.1 ACUTE COUGH: ICD-10-CM

## 2025-01-08 DIAGNOSIS — R07.81 RIB PAIN ON LEFT SIDE: ICD-10-CM

## 2025-01-08 DIAGNOSIS — R06.02 SHORT OF BREATH ON EXERTION: ICD-10-CM

## 2025-01-08 DIAGNOSIS — R05.1 ACUTE COUGH: Primary | ICD-10-CM

## 2025-01-08 PROCEDURE — 71046 X-RAY EXAM CHEST 2 VIEWS: CPT

## 2025-01-08 PROCEDURE — 3075F SYST BP GE 130 - 139MM HG: CPT | Performed by: STUDENT IN AN ORGANIZED HEALTH CARE EDUCATION/TRAINING PROGRAM

## 2025-01-08 PROCEDURE — 3079F DIAST BP 80-89 MM HG: CPT | Performed by: STUDENT IN AN ORGANIZED HEALTH CARE EDUCATION/TRAINING PROGRAM

## 2025-01-08 PROCEDURE — 99213 OFFICE O/P EST LOW 20 MIN: CPT | Performed by: STUDENT IN AN ORGANIZED HEALTH CARE EDUCATION/TRAINING PROGRAM

## 2025-01-08 PROCEDURE — 1126F AMNT PAIN NOTED NONE PRSNT: CPT | Performed by: STUDENT IN AN ORGANIZED HEALTH CARE EDUCATION/TRAINING PROGRAM

## 2025-01-08 NOTE — PROGRESS NOTES
Chief Complaint     Cough (Productive Cough since having flu A, tested positive 12/28/25 ), Rib Pain (LT side rib pain X 3 days ), Shortness of Breath (Since having the flu ), and Fatigue (Pt states she feels weak )    History of Present Illness     Yanni Huynh is a 79 y.o. female who presents to [facility] with complaints of left rib pain, cough with white sputum.  Was diagnosed with the flu 12/28/2024, states overall she is feeling much better it is just the cough and soreness in her ribs now.  Is still using her albuterol and taking Mucinex daily.  Has used a lidocaine patch on her ribs with some relief.  She is very concerned about getting pneumonia and would like a chest x-ray today just to ensure that she does not have pneumonia.    Denies any chest pain, fever, chills, nausea, vomiting, diarrhea, dizziness     History      Past Medical History:   Diagnosis Date    Allergic rhinitis     Anxiety     Asymptomatic menopausal state     Contusion of right knee     Fracture of ankle     History of falling     Hypertension     Keratoconus     s/p 3 cornea transplants    Laryngeal spasm     Low back pain     Mitral valve prolapse     Other long term (current) drug therapy     Pain in right knee     Primary insomnia     Sprain of left rotator cuff capsule        Past Surgical History:   Procedure Laterality Date    BREAST BIOPSY      CHOLECYSTECTOMY      COLON SURGERY      CORNEAL TRANSPLANT      x3    HERNIA REPAIR      HYSTERECTOMY      ORIF ANKLE FRACTURE Right        Family History   Problem Relation Age of Onset    Heart disease Mother     Heart attack Mother     Hypertension Mother     Coronary artery disease Mother     Lung disease Father         Current Medications        Current Outpatient Medications:     albuterol (PROVENTIL) (2.5 MG/3ML) 0.083% nebulizer solution, Take 2.5 mg by nebulization Every 4 (Four) Hours As Needed for Wheezing. (Patient taking differently: Take  2.5 mg by nebulization Every 4 (Four) Hours As Needed for Wheezing. Pt using once a day), Disp: 24 each, Rfl: 1    amLODIPine (NORVASC) 2.5 MG tablet, Take 1 tablet by mouth Daily., Disp: 90 tablet, Rfl: 1    atenolol (TENORMIN) 25 MG tablet, 1 tablet PO QAM and 1/4 tab PO QHS, Disp: 135 tablet, Rfl: 1    Calcium Carb-Cholecalciferol (CALCIUM 600 + D PO), Take  by mouth As Needed., Disp: , Rfl:     escitalopram (LEXAPRO) 10 MG tablet, Take 0.5 tablets by mouth Daily., Disp: 45 tablet, Rfl: 1    fluorometholone (FML) 0.1 % ophthalmic suspension, Administer 1 drop to both eyes Daily., Disp: , Rfl:     LORazepam (ATIVAN) 1 MG tablet, TAKE 1/2 TO 1 (ONE-HALF TO ONE) TABLET BY MOUTH AT BEDTIME AS NEEDED FOR SLEEP, Disp: 30 tablet, Rfl: 2    melatonin 5 MG tablet tablet, Take 1 tablet by mouth At Night As Needed., Disp: , Rfl:     Misc Natural Products (CORTISOL MANAGER PO), Take  by mouth Every Night., Disp: , Rfl:     Multiple Vitamins-Minerals (PRESERVISION AREDS PO), Take 1 tablet by mouth Daily., Disp: , Rfl:     pilocarpine (PILOCAR) 1 % ophthalmic solution, Administer 1 drop into the left eye 2 (Two) Times a Day., Disp: , Rfl:     ibuprofen (ADVIL,MOTRIN) 200 MG tablet, Take 1 tablet by mouth Every 6 (Six) Hours As Needed for Mild Pain. (Patient not taking: Reported on 1/8/2025), Disp: , Rfl:     Melatonin 1 MG capsule, Take 1.5 mg by mouth Every Night. (Patient not taking: Reported on 1/8/2025), Disp: , Rfl:     predniSONE (DELTASONE) 50 MG tablet, Take 1 tablet by mouth Daily. (Patient not taking: Reported on 1/8/2025), Disp: 4 tablet, Rfl: 0     Allergies     No Known Allergies    Social History       Social History     Social History Narrative    Not on file       Immunizations     Immunization:  Immunization History   Administered Date(s) Administered    COVID-19 (ANGEL) 05/07/2021    Td (TDVAX) 01/01/2019          Objective     Objective     Vital Signs:   /84 (BP Location: Right arm, Patient  "Position: Sitting, Cuff Size: Adult)   Pulse 73   Temp 98.4 °F (36.9 °C) (Oral)   Ht 160 cm (62.99\")   Wt 97.5 kg (215 lb)   SpO2 96%   BMI 38.10 kg/m²       Physical Exam  Vitals and nursing note reviewed.   Constitutional:       Appearance: Normal appearance. She is obese.   HENT:      Head: Normocephalic.   Eyes:      Conjunctiva/sclera: Conjunctivae normal.      Pupils: Pupils are equal, round, and reactive to light.   Cardiovascular:      Rate and Rhythm: Normal rate and regular rhythm.      Pulses: Normal pulses.      Heart sounds: Normal heart sounds.   Pulmonary:      Effort: Pulmonary effort is normal.      Comments: Crackles in the left base, cough present  Abdominal:      General: Bowel sounds are normal.      Palpations: Abdomen is soft.   Musculoskeletal:         General: Normal range of motion.      Cervical back: Normal range of motion and neck supple.      Comments: Left side/rib pain/tenderness from coughing   Skin:     General: Skin is warm and dry.   Neurological:      General: No focal deficit present.      Mental Status: She is alert and oriented to person, place, and time.   Psychiatric:         Attention and Perception: Attention normal.         Mood and Affect: Mood and affect normal.         Behavior: Behavior normal. Behavior is cooperative.         Results    The following data was reviewed by: DIEGO Salamanca on 01/08/25                XR Chest PA & Lateral (01/08/2025 12:55)   Assessment and Plan        Assessment and Plan       Acute cough    Orders:    XR Chest PA & Lateral; Future  Discussed the use of cough drops, Vicks VapoRub, Chloraseptic spray/lozenges, hot tea with honey, humidifier  Short of breath on exertion    Orders:    XR Chest PA & Lateral; Future  Continue using albuterol treatments.  Advised that it would just take some time for her to build her strength back up since having the flu.  Rib pain on left side    Orders:    XR Chest PA & Lateral; Future  This " left-sided pain/rib pain most likely due to coughing May continue to use lidocaine patches, Tylenol/ibuprofen, IcyHot as needed     Educated on hospital precautions.        Follow Up        Follow Up   Return for With PCP, Next scheduled follow up, sooner if condition worsens.  Patient was given instructions and counseling regarding her condition or for health maintenance advice. Please see specific information pulled into the AVS if appropriate.     Left arm;

## 2025-02-28 ENCOUNTER — TELEPHONE (OUTPATIENT)
Dept: FAMILY MEDICINE CLINIC | Age: 80
End: 2025-02-28
Payer: MEDICARE

## 2025-02-28 NOTE — TELEPHONE ENCOUNTER
This really needs to be evaluated in person.  Treating diarrhea without proper evaluation can be dangerous because certain infectious types of diarrhea can become life-threatening if we stop people from pooping.  I would be happy to send her in some Zofran, but if she feels the diarrhea needs treatment, she will need to be seen (which may mean she has to go to UC given how late in the day it is).  Thanks, SHERRY

## 2025-02-28 NOTE — TELEPHONE ENCOUNTER
Caller: Yanni Huynh    Relationship: Self    Best call back number: [phone]    What medication are you requesting: MEDICATION FOR DIARRHEA     What are your current symptoms: DIARRHEA 3 TIMES THIS MORNING     How long have you been experiencing symptoms: STARTED WEDNESDAY RETURNED TODAY       If a prescription is needed, what is your preferred pharmacy and phone number: [facility] [address] [facility] [address] [facility] [phone] [facility] [phone] FX     Additional notes: PATIENT VOMITED AND HAD DIARRHEA ALL DAY WEDNESDAY AND FELT BETTER THURSDAY AND THE DIARRHEA RETURNED TODAY IN [place] AND SHE HAS A LOW GRADE FEVER SHE IS CONCERNED SHE MAY GET DEHYDRATED AND DOES NOT WANT TO COME TO THE OFFICE BECAUSE THE DIARRHEA IS UNCONTROLLABLE   PLEASE CONTACT WHEN PRESCRIPTION IS SENT OR IF THERE ARE ANY QUESTIONS

## 2025-03-19 ENCOUNTER — LAB (OUTPATIENT)
Dept: LAB | Facility: HOSPITAL | Age: 80
End: 2025-03-19
Payer: MEDICARE

## 2025-03-19 ENCOUNTER — OFFICE VISIT (OUTPATIENT)
Dept: FAMILY MEDICINE CLINIC | Age: 80
End: 2025-03-19
Payer: MEDICARE

## 2025-03-19 ENCOUNTER — RESULTS FOLLOW-UP (OUTPATIENT)
Dept: FAMILY MEDICINE CLINIC | Age: 80
End: 2025-03-19

## 2025-03-19 VITALS
HEIGHT: 63 IN | DIASTOLIC BLOOD PRESSURE: 84 MMHG | BODY MASS INDEX: 37.28 KG/M2 | WEIGHT: 210.4 LBS | SYSTOLIC BLOOD PRESSURE: 141 MMHG | HEART RATE: 54 BPM | OXYGEN SATURATION: 95 % | TEMPERATURE: 97.9 F

## 2025-03-19 DIAGNOSIS — Z79.899 HIGH RISK MEDICATION USE: ICD-10-CM

## 2025-03-19 DIAGNOSIS — H35.3130 BILATERAL NONEXUDATIVE AGE-RELATED MACULAR DEGENERATION, UNSPECIFIED STAGE: ICD-10-CM

## 2025-03-19 DIAGNOSIS — I10 ESSENTIAL (PRIMARY) HYPERTENSION: ICD-10-CM

## 2025-03-19 DIAGNOSIS — F41.1 GENERALIZED ANXIETY DISORDER: Primary | ICD-10-CM

## 2025-03-19 DIAGNOSIS — E66.812 CLASS 2 SEVERE OBESITY DUE TO EXCESS CALORIES WITH SERIOUS COMORBIDITY AND BODY MASS INDEX (BMI) OF 37.0 TO 37.9 IN ADULT: ICD-10-CM

## 2025-03-19 DIAGNOSIS — E66.01 CLASS 2 SEVERE OBESITY DUE TO EXCESS CALORIES WITH SERIOUS COMORBIDITY AND BODY MASS INDEX (BMI) OF 37.0 TO 37.9 IN ADULT: ICD-10-CM

## 2025-03-19 DIAGNOSIS — Z79.899 HIGH RISK MEDICATION USE: Primary | ICD-10-CM

## 2025-03-19 PROBLEM — Z91.81 HISTORY OF FALLING: Status: RESOLVED | Noted: 2021-07-14 | Resolved: 2025-03-19

## 2025-03-19 PROBLEM — G56.22 ULNAR NEURITIS, LEFT: Status: RESOLVED | Noted: 2018-02-24 | Resolved: 2025-03-19

## 2025-03-19 LAB
AMPHET+METHAMPHET UR QL: NEGATIVE
AMPHETAMINES UR QL: NEGATIVE
ANION GAP SERPL CALCULATED.3IONS-SCNC: 13.2 MMOL/L (ref 5–15)
BARBITURATES UR QL SCN: NEGATIVE
BENZODIAZ UR QL SCN: NEGATIVE
BUN SERPL-MCNC: 18 MG/DL (ref 8–23)
BUN/CREAT SERPL: 23.4 (ref 7–25)
BUPRENORPHINE SERPL-MCNC: NEGATIVE NG/ML
CALCIUM SPEC-SCNC: 9.7 MG/DL (ref 8.6–10.5)
CANNABINOIDS SERPL QL: NEGATIVE
CHLORIDE SERPL-SCNC: 99 MMOL/L (ref 98–107)
CO2 SERPL-SCNC: 23.8 MMOL/L (ref 22–29)
COCAINE UR QL: NEGATIVE
CREAT SERPL-MCNC: 0.77 MG/DL (ref 0.57–1)
EGFRCR SERPLBLD CKD-EPI 2021: 77.6 ML/MIN/1.73
EXPIRATION DATE: NORMAL
GLUCOSE SERPL-MCNC: 140 MG/DL (ref 65–99)
HCT VFR BLD AUTO: 40.1 % (ref 34–46.6)
HGB BLD-MCNC: 13.9 G/DL (ref 12–15.9)
Lab: NORMAL
MDMA UR QL SCN: NEGATIVE
METHADONE UR QL SCN: NEGATIVE
MORPHINE/OPIATES SCREEN, URINE: NEGATIVE
OXYCODONE UR QL SCN: NEGATIVE
PCP UR QL SCN: NEGATIVE
POTASSIUM SERPL-SCNC: 4.4 MMOL/L (ref 3.5–5.2)
SODIUM SERPL-SCNC: 136 MMOL/L (ref 136–145)

## 2025-03-19 PROCEDURE — 36415 COLL VENOUS BLD VENIPUNCTURE: CPT

## 2025-03-19 PROCEDURE — 80048 BASIC METABOLIC PNL TOTAL CA: CPT

## 2025-03-19 PROCEDURE — 85018 HEMOGLOBIN: CPT

## 2025-03-19 PROCEDURE — 85014 HEMATOCRIT: CPT

## 2025-03-19 RX ORDER — ESCITALOPRAM OXALATE 10 MG/1
5 TABLET ORAL DAILY
Qty: 45 TABLET | Refills: 1 | Status: SHIPPED | OUTPATIENT
Start: 2025-03-19

## 2025-03-19 RX ORDER — ATENOLOL 25 MG/1
TABLET ORAL
Qty: 135 TABLET | Refills: 1 | Status: SHIPPED | OUTPATIENT
Start: 2025-03-19

## 2025-03-19 NOTE — ASSESSMENT & PLAN NOTE
Stable on current regimen.  Symptoms are well controlled.  No adverse effects. She does require ongoing use of this controlled substance to function.  Tox screen was due today.  Prior tox screen appropriate.  OSWALDO was run today.  Refills were needed today.  RTC 3 months.  Orders:    escitalopram (LEXAPRO) 10 MG tablet; Take 0.5 tablets by mouth Daily.

## 2025-03-19 NOTE — PROGRESS NOTES
"Chief Complaint  Annual Exam    Subjective          Yanni Huynh presents to [facility] today for routine f/u of chronic issues.     She is on lorazepam, melatonin and escitalopram for anxiety and insomnia.  Mood has been \"fine.\"   No panic attacks or anxiety.  She has previously followed with Dr. Demetrius Romero for sleep therapy (2015) which allowed her to significantly decrease the amount of lorazepam she was using but she was unable to decrease her bedtime dose lower than 1 mg.  She has previously tried trazodone and Ambien but had intolerable side effects.      She is on amlodipine and atenolol for hypertension.  Her blood pressure has been well controlled.  Denies CP, palpitations, or SOB.  Her BP is up today; she reports that she forgot to take her amlodipine this morning.      Follows with Dr. Gallardo Ophthalmology for macular degeneration.  S/p corneal transplant.      Current Outpatient Medications:   •  amLODIPine (NORVASC) 2.5 MG tablet, Take 1 tablet by mouth Daily., Disp: 90 tablet, Rfl: 1  •  atenolol (TENORMIN) 25 MG tablet, 1 tablet PO QAM and 1/4 tab PO QHS, Disp: 135 tablet, Rfl: 1  •  Calcium Carb-Cholecalciferol (CALCIUM 600 + D PO), Take  by mouth As Needed., Disp: , Rfl:   •  escitalopram (LEXAPRO) 10 MG tablet, Take 0.5 tablets by mouth Daily., Disp: 45 tablet, Rfl: 1  •  fluorometholone (FML) 0.1 % ophthalmic suspension, Administer 1 drop to both eyes Daily., Disp: , Rfl:   •  ibuprofen (ADVIL,MOTRIN) 200 MG tablet, Take 1 tablet by mouth Every 6 (Six) Hours As Needed for Mild Pain., Disp: , Rfl:   •  LORazepam (ATIVAN) 1 MG tablet, TAKE 1/2 TO 1 (ONE-HALF TO ONE) TABLET BY MOUTH AT BEDTIME AS NEEDED FOR SLEEP, Disp: 30 tablet, Rfl: 2  •  melatonin 5 MG tablet tablet, Take 1 tablet by mouth At Night As Needed., Disp: , Rfl:   •  Misc Natural Products (CORTISOL MANAGER PO), Take  by mouth Every Night., Disp: , Rfl:   •  Multiple Vitamins-Minerals " "(PRESERVISION AREDS PO), Take 1 tablet by mouth Daily., Disp: , Rfl:   •  pilocarpine (PILOCAR) 1 % ophthalmic solution, Administer 1 drop into the left eye 2 (Two) Times a Day., Disp: , Rfl:   Medications Discontinued During This Encounter   Medication Reason   • albuterol (PROVENTIL) (2.5 MG/3ML) 0.083% nebulizer solution Historical Med - Therapy completed   • Melatonin 1 MG capsule Historical Med - Therapy completed   • predniSONE (DELTASONE) 50 MG tablet *Therapy completed   • escitalopram (LEXAPRO) 10 MG tablet Reorder   • atenolol (TENORMIN) 25 MG tablet Reorder           Allergies:  Patient has no known allergies.      Objective   Vital Signs:   Vitals:    03/19/25 1403   BP: 165/57   BP Location: Right arm   Patient Position: Sitting   Cuff Size: Large Adult   Pulse: 54   Temp: 97.9 °F (36.6 °C)   TempSrc: Oral   SpO2: 95%   Weight: 95.4 kg (210 lb 6.4 oz)   Height: 160 cm (62.99\")     Body mass index is 37.28 kg/m².           Physical Exam  Vitals reviewed.   Constitutional:       General: She is not in acute distress.     Appearance: Normal appearance. She is well-developed.   HENT:      Head: Normocephalic and atraumatic.      Right Ear: External ear normal.      Left Ear: External ear normal.   Eyes:      Extraocular Movements: Extraocular movements intact.      Conjunctiva/sclera: Conjunctivae normal.      Pupils: Pupils are equal, round, and reactive to light.   Cardiovascular:      Rate and Rhythm: Normal rate and regular rhythm.      Heart sounds: No murmur heard.  Pulmonary:      Effort: Pulmonary effort is normal.      Breath sounds: Normal breath sounds. No wheezing, rhonchi or rales.   Abdominal:      General: Bowel sounds are normal. There is no distension.      Palpations: Abdomen is soft.      Tenderness: There is no abdominal tenderness.      Hernia: There is no hernia in the left inguinal area or right inguinal area.   Genitourinary:     General: Normal vulva.      Pubic Area: No rash.       " Labia:         Right: No rash, tenderness, lesion or injury.         Left: No rash, tenderness, lesion or injury.       Urethra: No prolapse, urethral pain, urethral swelling or urethral lesion.      Vagina: Normal.      Uterus: Absent.    Musculoskeletal:         General: Normal range of motion.   Neurological:      Mental Status: She is alert.   Psychiatric:         Mood and Affect: Affect normal.         Lab Results   Component Value Date    GLUCOSE 126 (H) 08/30/2024    BUN 18 08/30/2024    CREATININE 0.56 (L) 08/30/2024    EGFRIFNONA 97 01/13/2022    BCR 32.1 (H) 08/30/2024    K 3.7 08/30/2024    CO2 28.0 08/30/2024    CALCIUM 9.6 08/30/2024    ALBUMIN 4.4 08/30/2024    AST 18 08/30/2024    ALT 24 08/30/2024       Lab Results   Component Value Date    CHOL 162 08/30/2024    CHLPL 185 03/01/2021    TRIG 221 (H) 08/30/2024    HDL 38 (L) 08/30/2024    LDL 87 08/30/2024       Lab Results   Component Value Date    WBC 9.78 08/30/2024    HGB 13.3 08/30/2024    HCT 39.2 08/30/2024    MCV 86.0 08/30/2024     08/30/2024            Assessment and Plan    Assessment & Plan  Generalized anxiety disorder    Stable on current regimen.  Symptoms are well controlled.  No adverse effects. She does require ongoing use of this controlled substance to function.  Tox screen was due today.  Prior tox screen appropriate.  OSWALDO was run today.  Refills were needed today.  RTC 3 months.  Orders:  •  escitalopram (LEXAPRO) 10 MG tablet; Take 0.5 tablets by mouth Daily.    High risk medication use    Orders:  •  POC Medline 12 Panel Urine Drug Screen    Essential (primary) hypertension    Blood pressure has been running at goal.  Continue amlodipine 2.5 mg daily and atenolol 25mg daily.  Refills were needed today.  Labs were needed today.  Continue to monitor.  Orders:  •  atenolol (TENORMIN) 25 MG tablet; 1 tablet PO QAM and 1/4 tab PO QHS  •  Basic Metabolic Panel; Future  •  Hemoglobin & Hematocrit, Blood; Future    Bilateral  nonexudative age-related macular degeneration, unspecified stage  Following with Dr. Gallardo Ophthalmology.       Class 2 severe obesity due to excess calories with serious comorbidity and body mass index (BMI) of 37.0 to 37.9 in adult  Patient's (Body mass index is 37.28 kg/m².) indicates that they are morbidly/severely obese (BMI > 40 or > 35 with obesity - related health condition) with health conditions that include hypertension . Weight is improving with lifestyle modifications. BMI  is above average; BMI management plan is completed. We discussed portion control and increasing exercise.              Follow Up   Return in about 3 months (around 6/19/2025) for Recheck.  Patient was given instructions and counseling regarding her condition or for health maintenance advice. Please see specific information pulled into the AVS if appropriate.           12/12/2024

## 2025-03-19 NOTE — ASSESSMENT & PLAN NOTE
Blood pressure has been running at goal.  Continue amlodipine 2.5 mg daily and atenolol 25mg daily.  Refills were needed today.  Labs were needed today.  Continue to monitor.  Orders:    atenolol (TENORMIN) 25 MG tablet; 1 tablet PO QAM and 1/4 tab PO QHS    Basic Metabolic Panel; Future    Hemoglobin & Hematocrit, Blood; Future

## 2025-03-20 PROCEDURE — G0480 DRUG TEST DEF 1-7 CLASSES: HCPCS | Performed by: FAMILY MEDICINE

## 2025-03-25 LAB — BENZODIAZ CTO UR CFM-MCNC: NEGATIVE NG/ML

## 2025-05-05 ENCOUNTER — OFFICE VISIT (OUTPATIENT)
Dept: FAMILY MEDICINE CLINIC | Age: 80
End: 2025-05-05
Payer: MEDICARE

## 2025-05-05 VITALS
HEIGHT: 63 IN | TEMPERATURE: 98.2 F | BODY MASS INDEX: 36.89 KG/M2 | WEIGHT: 208.2 LBS | SYSTOLIC BLOOD PRESSURE: 157 MMHG | HEART RATE: 53 BPM | OXYGEN SATURATION: 96 % | DIASTOLIC BLOOD PRESSURE: 82 MMHG

## 2025-05-05 DIAGNOSIS — L30.4 INTERTRIGO: Primary | ICD-10-CM

## 2025-05-05 DIAGNOSIS — I10 ESSENTIAL (PRIMARY) HYPERTENSION: ICD-10-CM

## 2025-05-05 PROCEDURE — 3077F SYST BP >= 140 MM HG: CPT

## 2025-05-05 PROCEDURE — 3079F DIAST BP 80-89 MM HG: CPT

## 2025-05-05 PROCEDURE — 1126F AMNT PAIN NOTED NONE PRSNT: CPT

## 2025-05-05 PROCEDURE — 99213 OFFICE O/P EST LOW 20 MIN: CPT

## 2025-05-05 PROCEDURE — 1159F MED LIST DOCD IN RCRD: CPT

## 2025-05-05 PROCEDURE — 1160F RVW MEDS BY RX/DR IN RCRD: CPT

## 2025-05-05 RX ORDER — CLOTRIMAZOLE 1 %
1 CREAM (GRAM) TOPICAL 2 TIMES DAILY
Qty: 45 G | Refills: 0 | Status: SHIPPED | OUTPATIENT
Start: 2025-05-05

## 2025-05-05 NOTE — PROGRESS NOTES
Subjective     CHIEF COMPLAINT    Chief Complaint   Patient presents with    Cellulitis     Possible cellulitis, red spot under skin fold      Patient or patient representative verbalized consent for the use of Ambient Listening during the visit with  DIEGO Salgado for chart documentation. 5/5/2025  10:19 EDT  History of Present Illness  The patient is an 79-year-old female who presents for evaluation of a skin condition.    She reports a skin condition located at the junction of her abdomen and leg, where her stomach overhangs. Initially, she suspected it to be a fungal infection due to perspiration. However, upon closer examination yesterday, she noticed an enlargement in one area. She describes the affected area as warm and painful but not itchy. She recalls an incident last week where she accidentally hit the edge above the affected area, causing pain but no visible bruising. She also mentions that the area feels raw and sore. She first noticed the condition during her shower on Sunday night when she experienced a burning sensation upon washing the area. She has not been scratching the area. She has been applying hand lotion with aloe vera, which causes a burning sensation upon application. She has a history of cellulitis and is concerned about a recurrence. She has not experienced any fevers.    Her blood pressure was elevated.          Review of Systems   Constitutional:  Negative for chills and fever.   Skin:  Positive for rash.         Past Medical History:   Diagnosis Date    Allergic rhinitis     Anxiety     Asymptomatic menopausal state     Contusion of right knee     Fracture of ankle     History of falling     Hypertension     Keratoconus     s/p 3 cornea transplants    Laryngeal spasm     Low back pain     Mitral valve prolapse     Other long term (current) drug therapy     Pain in right knee     Primary insomnia     Sprain of left rotator cuff capsule          Past Surgical History:   Procedure  Laterality Date    BREAST BIOPSY      CHOLECYSTECTOMY      COLON SURGERY      CORNEAL TRANSPLANT      x3    HERNIA REPAIR      HYSTERECTOMY      ORIF ANKLE FRACTURE Right          Family History   Problem Relation Age of Onset    Heart disease Mother     Heart attack Mother     Hypertension Mother     Coronary artery disease Mother     Lung disease Father          Social History     Socioeconomic History    Marital status:     Number of children: 1   Tobacco Use    Smoking status: Never     Passive exposure: Past    Smokeless tobacco: Never   Vaping Use    Vaping status: Never Used   Substance and Sexual Activity    Alcohol use: No    Drug use: Defer    Sexual activity: Defer         No Known Allergies       Current Outpatient Medications on File Prior to Visit   Medication Sig Dispense Refill    amLODIPine (NORVASC) 2.5 MG tablet Take 1 tablet by mouth Daily. 90 tablet 1    atenolol (TENORMIN) 25 MG tablet 1 tablet PO QAM and 1/4 tab PO  tablet 1    Calcium Carb-Cholecalciferol (CALCIUM 600 + D PO) Take  by mouth As Needed.      escitalopram (LEXAPRO) 10 MG tablet Take 0.5 tablets by mouth Daily. 45 tablet 1    fluorometholone (FML) 0.1 % ophthalmic suspension Administer 1 drop to both eyes Daily.      ibuprofen (ADVIL,MOTRIN) 200 MG tablet Take 1 tablet by mouth Every 6 (Six) Hours As Needed for Mild Pain.      LORazepam (ATIVAN) 1 MG tablet TAKE 1/2 TO 1 (ONE-HALF TO ONE) TABLET BY MOUTH AT BEDTIME AS NEEDED FOR SLEEP 30 tablet 2    melatonin 5 MG tablet tablet Take 1 tablet by mouth At Night As Needed.      Misc Natural Products (CORTISOL MANAGER PO) Take  by mouth Every Night.      Multiple Vitamins-Minerals (PRESERVISION AREDS PO) Take 1 tablet by mouth Daily.      pilocarpine (PILOCAR) 1 % ophthalmic solution Administer 1 drop into the left eye 2 (Two) Times a Day.       No current facility-administered medications on file prior to visit.          /82   Pulse 53   Temp 98.2 °F (36.8 °C)  "(Oral)   Ht 160 cm (62.99\")   Wt 94.4 kg (208 lb 3.2 oz)   SpO2 96%   BMI 36.89 kg/m²       Objective     Physical Exam  Vitals and nursing note reviewed.   Constitutional:       General: She is not in acute distress.     Appearance: Normal appearance. She is not ill-appearing.   Skin:     General: Skin is warm and dry.      Capillary Refill: Capillary refill takes less than 2 seconds.      Findings: Rash present.      Comments: Erythematous, moist appearing rash to left lower abdomen in panniculus. No drainage or bleeding. No surrounding erythema. Tender    Neurological:      General: No focal deficit present.      Mental Status: She is alert and oriented to person, place, and time.   Psychiatric:         Mood and Affect: Mood and affect normal.         Behavior: Behavior normal.         Assessment & Plan  [facility]  - A prescription for an antifungal cream will be sent to the pharmacy  - She is advised to discontinue the use of the current lotion and to maintain dryness in the affected area by using absorbent materials such as a pillowcase. Antibiotics are not deemed necessary for this condition.  Orders:    clotrimazole (Lotrimin AF) 1 % cream; Apply 1 Application topically to the appropriate area as directed 2 (Two) Times a Day.    Essential (primary) hypertension  Blood pressure elevated in office today.  Recommend monitoring at home.  Follow-up with PCP if remains elevated.             Follow up:  Return if symptoms worsen or fail to improve.  Patient was given instructions and counseling regarding her condition or for health maintenance advice. Please see specific information pulled into the AVS if appropriate.   "

## 2025-05-05 NOTE — ASSESSMENT & PLAN NOTE
Blood pressure elevated in office today.  Recommend monitoring at home.  Follow-up with PCP if remains elevated.

## 2025-07-02 ENCOUNTER — OFFICE VISIT (OUTPATIENT)
Dept: FAMILY MEDICINE CLINIC | Age: 80
End: 2025-07-02
Payer: MEDICARE

## 2025-07-02 VITALS
DIASTOLIC BLOOD PRESSURE: 77 MMHG | HEIGHT: 63 IN | HEART RATE: 59 BPM | WEIGHT: 209.8 LBS | SYSTOLIC BLOOD PRESSURE: 152 MMHG | TEMPERATURE: 98.7 F | BODY MASS INDEX: 37.17 KG/M2 | OXYGEN SATURATION: 95 %

## 2025-07-02 DIAGNOSIS — H35.3130 BILATERAL NONEXUDATIVE AGE-RELATED MACULAR DEGENERATION, UNSPECIFIED STAGE: ICD-10-CM

## 2025-07-02 DIAGNOSIS — Z79.899 HIGH RISK MEDICATION USE: ICD-10-CM

## 2025-07-02 DIAGNOSIS — F41.1 GENERALIZED ANXIETY DISORDER: Primary | ICD-10-CM

## 2025-07-02 DIAGNOSIS — I10 ESSENTIAL (PRIMARY) HYPERTENSION: ICD-10-CM

## 2025-07-02 DIAGNOSIS — E66.01 CLASS 2 SEVERE OBESITY DUE TO EXCESS CALORIES WITH SERIOUS COMORBIDITY AND BODY MASS INDEX (BMI) OF 37.0 TO 37.9 IN ADULT: ICD-10-CM

## 2025-07-02 DIAGNOSIS — E66.812 CLASS 2 SEVERE OBESITY DUE TO EXCESS CALORIES WITH SERIOUS COMORBIDITY AND BODY MASS INDEX (BMI) OF 37.0 TO 37.9 IN ADULT: ICD-10-CM

## 2025-07-02 DIAGNOSIS — Z94.7 CORNEAL TRANSPLANT STATUS: ICD-10-CM

## 2025-07-02 RX ORDER — AMLODIPINE BESYLATE 5 MG/1
5 TABLET ORAL DAILY
Qty: 90 TABLET | Refills: 1 | Status: SHIPPED | OUTPATIENT
Start: 2025-07-02

## 2025-07-02 NOTE — PROGRESS NOTES
Chief Complaint  Anxiety    Patient or patient representative verbalized consent for the use of Ambient Listening during the visit with  Maria E Melgar MD for chart documentation. 7/2/2025  14:12 EDT     Subjective          Yanni Huynh presents to [facility] today for follow-up of routine issues.    History of Present Illness  The patient is an 79-year-old female here for follow-up on routine issues.    She is on lorazepam, escitalopram, and melatonin for anxiety and insomnia. She previously followed with Dr. Aguilera for sleep therapy in 2015, which allowed her to decrease the amount of lorazepam she was using.  She has previously tried trazodone and Ambien, but both had adverse side effects. She has cut back on her lorazepam, and #30 tablets now typically last her about 2 months. Her last dispensed refill was on 05/16/2025. She reports that her current medications, including Lexapro, are effective, and she has been sleeping well. She has reduced her lorazepam dosage by cutting the tablets in half.    She is on amlodipine and atenolol for blood pressure management. She notes that her blood pressure readings are usually high during initial measurements. She attributes her elevated blood pressure to recent stressors, including a family member's involvement in a car accident. She also mentions consuming a significant amount of coffee prior to her appointment, which she believes may have contributed to her high blood pressure reading.    She has experienced weight loss, which she attributes to dietary changes, including the consumption of sugar-free foods.    She had two areas that looked like skin cancer, so she saw Dr. Whyte who burned them off and assumed it was probably basal cell carcinoma because it started to look just like what she had there before. She will see Dr. Whyte in 3 months.             Current Outpatient Medications:     amLODIPine (NORVASC) 5 MG  "tablet, Take 1 tablet by mouth Daily., Disp: 90 tablet, Rfl: 1    atenolol (TENORMIN) 25 MG tablet, 1 tablet PO QAM and 1/4 tab PO QHS, Disp: 135 tablet, Rfl: 1    Calcium Carb-Cholecalciferol (CALCIUM 600 + D PO), Take  by mouth As Needed., Disp: , Rfl:     escitalopram (LEXAPRO) 10 MG tablet, Take 0.5 tablets by mouth Daily., Disp: 45 tablet, Rfl: 1    fluorometholone (FML) 0.1 % ophthalmic suspension, Administer 1 drop to both eyes Daily., Disp: , Rfl:     ibuprofen (ADVIL,MOTRIN) 200 MG tablet, Take 1 tablet by mouth Every 6 (Six) Hours As Needed for Mild Pain., Disp: , Rfl:     LORazepam (ATIVAN) 1 MG tablet, TAKE 1/2 TO 1 (ONE-HALF TO ONE) TABLET BY MOUTH AT BEDTIME AS NEEDED FOR SLEEP, Disp: 30 tablet, Rfl: 2    melatonin 5 MG tablet tablet, Take 1 tablet by mouth At Night As Needed., Disp: , Rfl:     Misc Natural Products (CORTISOL MANAGER PO), Take  by mouth Every Night., Disp: , Rfl:     Multiple Vitamins-Minerals (PRESERVISION AREDS PO), Take 1 tablet by mouth Daily., Disp: , Rfl:     pilocarpine (PILOCAR) 1 % ophthalmic solution, Administer 1 drop into the left eye 2 (Two) Times a Day., Disp: , Rfl:   Medications Discontinued During This Encounter   Medication Reason    clotrimazole (Lotrimin AF) 1 % cream Historical Med - Therapy completed    amLODIPine (NORVASC) 2.5 MG tablet Reorder         Allergies:  Patient has no known allergies.      Objective   Vital Signs:   Vitals:    07/02/25 1356 07/02/25 1425   BP: 150/78 152/77   BP Location: Left arm    Patient Position: Sitting    Cuff Size: Large Adult    Pulse: 59    Temp: 98.7 °F (37.1 °C)    TempSrc: Temporal    SpO2: 95%    Weight: 95.2 kg (209 lb 12.8 oz)    Height: 160 cm (62.99\")      Body mass index is 37.17 kg/m².           Physical Exam  Vitals reviewed.   Constitutional:       General: She is not in acute distress.     Appearance: Normal appearance. She is well-developed.   HENT:      Head: Normocephalic and atraumatic.      Right Ear: " External ear normal.      Left Ear: External ear normal.   Eyes:      Extraocular Movements: Extraocular movements intact.      Conjunctiva/sclera: Conjunctivae normal.      Pupils: Pupils are equal, round, and reactive to light.   Cardiovascular:      Rate and Rhythm: Normal rate and regular rhythm.      Heart sounds: No murmur heard.  Pulmonary:      Effort: Pulmonary effort is normal.      Breath sounds: Normal breath sounds. No wheezing, rhonchi or rales.   Abdominal:      General: Bowel sounds are normal. There is no distension.      Palpations: Abdomen is soft.      Tenderness: There is no abdominal tenderness.      Hernia: There is no hernia in the left inguinal area or right inguinal area.   Genitourinary:     General: Normal vulva.      Pubic Area: No rash.       Labia:         Right: No rash, tenderness, lesion or injury.         Left: No rash, tenderness, lesion or injury.       Urethra: No prolapse, urethral pain, urethral swelling or urethral lesion.      Vagina: Normal.      Uterus: Absent.    Musculoskeletal:         General: Normal range of motion.   Neurological:      Mental Status: She is alert.   Psychiatric:         Mood and Affect: Affect normal.           Lab Results   Component Value Date    GLUCOSE 140 (H) 03/19/2025    BUN 18 03/19/2025    CREATININE 0.77 03/19/2025    EGFRIFNONA 97 01/13/2022    BCR 23.4 03/19/2025    K 4.4 03/19/2025    CO2 23.8 03/19/2025    CALCIUM 9.7 03/19/2025    ALBUMIN 4.4 08/30/2024    AST 18 08/30/2024    ALT 24 08/30/2024       Lab Results   Component Value Date    CHOL 162 08/30/2024    CHLPL 185 03/01/2021    TRIG 221 (H) 08/30/2024    HDL 38 (L) 08/30/2024    LDL 87 08/30/2024       Lab Results   Component Value Date    WBC 9.78 08/30/2024    HGB 13.9 03/19/2025    HCT 40.1 03/19/2025    MCV 86.0 08/30/2024     08/30/2024            Assessment and Plan    Assessment & Plan  Generalized anxiety disorder    Stable on current regimen.  Symptoms are well  controlled.  No adverse effects. She does require ongoing use of this controlled substance to function.  Tox screen was due today.  Prior tox screen appropriate.  OSWALDO was run today.  Refills were not needed today.  RTC 3 months.       High risk medication use    Orders:    POC Medline 12 Panel Urine Drug Screen    Essential (primary) hypertension      Orders:    amLODIPine (NORVASC) 5 MG tablet; Take 1 tablet by mouth Daily.    Class 2 severe obesity due to excess calories with serious comorbidity and body mass index (BMI) of 37.0 to 37.9 in adult  Patient's (Body mass index is 37.17 kg/m².) indicates that they are morbidly/severely obese (BMI > 40 or > 35 with obesity - related health condition) with health conditions that include hypertension . Weight is improving with lifestyle modifications. BMI  is above average; BMI management plan is completed. We discussed portion control and increasing exercise.          Corneal transplant status         Bilateral nonexudative age-related macular degeneration, unspecified stage                 Assessment & Plan  Anxiety and insomnia  - Currently on lorazepam, escitalopram, and melatonin  - Successfully reduced lorazepam intake, with 30 tablets lasting approximately 2 months  - Last refill was on 05/16/2025  - Advised to continue current regimen and further reduce lorazepam use if possible due to its potential link to dementia    Blood pressure management  - Blood pressure slightly elevated during this visit, likely due to recent stress from a family accident  - Increase amlodipine dosage to 5 mg daily  - Atenolol dosage remains at 25 mg daily  - Recheck blood pressure before leaving  - Prescription sent to pharmacy    Weight management  - Experienced weight loss attributed to dietary changes, including consumption of sugar-free foods  - Encouraged to maintain current lifestyle modifications  - Weight is down from previous visit    Skin lesions  - Two lesions treated by  Dr. Whyte, presumed to be basal cell carcinoma  - Lesions were burned off  - Follow-up scheduled in 3 months  - No further concerns regarding the lesions at this time        Follow Up   Return in about 3 months (around 10/2/2025) for Medicare Wellness.  Patient was given instructions and counseling regarding her condition or for health maintenance advice. Please see specific information pulled into the AVS if appropriate.           07/02/2025

## 2025-07-02 NOTE — ASSESSMENT & PLAN NOTE
{Hypertension is (optional):[id number]}    Orders:    amLODIPine (NORVASC) 5 MG tablet; Take 1 tablet by mouth Daily.

## 2025-07-02 NOTE — ASSESSMENT & PLAN NOTE
{Psychiatric illness (Optional):64385}  Stable on current regimen.  Symptoms are well controlled.  No adverse effects. She does require ongoing use of this controlled substance to function.  Tox screen was due today.  Prior tox screen appropriate.  OSWALDO was run today.  Refills were not needed today.  RTC 3 months.

## 2025-07-10 ENCOUNTER — OFFICE VISIT (OUTPATIENT)
Dept: FAMILY MEDICINE CLINIC | Age: 80
End: 2025-07-10
Payer: MEDICARE

## 2025-07-10 VITALS
SYSTOLIC BLOOD PRESSURE: 138 MMHG | BODY MASS INDEX: 37.21 KG/M2 | OXYGEN SATURATION: 99 % | TEMPERATURE: 97.8 F | DIASTOLIC BLOOD PRESSURE: 80 MMHG | HEIGHT: 63 IN | WEIGHT: 210 LBS | HEART RATE: 60 BPM

## 2025-07-10 DIAGNOSIS — R21 RASH: Primary | ICD-10-CM

## 2025-07-10 DIAGNOSIS — I10 ESSENTIAL (PRIMARY) HYPERTENSION: ICD-10-CM

## 2025-07-10 PROCEDURE — 1160F RVW MEDS BY RX/DR IN RCRD: CPT | Performed by: NURSE PRACTITIONER

## 2025-07-10 PROCEDURE — 1126F AMNT PAIN NOTED NONE PRSNT: CPT | Performed by: NURSE PRACTITIONER

## 2025-07-10 PROCEDURE — 99213 OFFICE O/P EST LOW 20 MIN: CPT | Performed by: NURSE PRACTITIONER

## 2025-07-10 PROCEDURE — 1159F MED LIST DOCD IN RCRD: CPT | Performed by: NURSE PRACTITIONER

## 2025-07-10 PROCEDURE — 3078F DIAST BP <80 MM HG: CPT | Performed by: NURSE PRACTITIONER

## 2025-07-10 PROCEDURE — 3075F SYST BP GE 130 - 139MM HG: CPT | Performed by: NURSE PRACTITIONER

## 2025-07-10 RX ORDER — METHYLPREDNISOLONE 4 MG/1
TABLET ORAL
Qty: 21 TABLET | Refills: 0 | Status: SHIPPED | OUTPATIENT
Start: 2025-07-10

## 2025-07-10 NOTE — PROGRESS NOTES
Chief Complaint  Rash (Itchy Rash on both hands X 2 days /Using Benadryl gel for Sx, this has helped with redness. 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 and rash started after that   Areas effected:  hands, between fingers and arms/wrist  Remedies tried:  benadryl gel helps some with the itching     PAST MEDICAL HISTORY changes:         Hypertension     keratoconus s/p 3 cornea transplants     Insomnia    MVP      PREVENTIVE HEALTH MAINTENANCE              COLORECTAL CANCER SCREENING: Up to date (colonoscopy q10y; sigmoidoscopy q5y; Cologuard q3y) was last done 17, Results are in chart; colonoscopy with normal results; The next colonoscopy is due  2027; diverticulosis and hemorrhoids             CURRENT MEDICAL PROVIDERS:    Dentist: Dr lange    General Surgeon: Apryl Denton    Ophthalmologist: Dr. Gallardo/Dr. Aguiar           Surgical History:         Cholecystectomy     Hernia Repair: ventral hernia;     Hysterectomy: total, d/t endometriosis;     cornea transplant x 3;    colectomy (14 in) d/t diverticulosis;  Breast biopsy    R ankle ORIF;         Family History:       Father: Lung Cancer ( dec at age 54 )     Mother: Coronary Artery Disease,           Social History:       Occupation: Homemaker/ Retired     Marital Status:      Children: 1 child           Past Medical History:   Diagnosis Date    Allergic rhinitis     Anxiety     Asymptomatic menopausal state     Contusion of right knee     Fracture of ankle     History of falling     Hypertension     Keratoconus     s/p 3 cornea transplants    Laryngeal spasm     Low back pain     Mitral valve prolapse     Other long term (current) drug therapy     Pain in right knee     Primary insomnia     Sprain of left  rotator cuff capsule        No Known Allergies     Past Surgical History:   Procedure Laterality Date    BREAST BIOPSY      CHOLECYSTECTOMY      COLON SURGERY      CORNEAL TRANSPLANT      x3    HERNIA REPAIR      HYSTERECTOMY      ORIF ANKLE FRACTURE Right         Social History     Tobacco Use    Smoking status: Never     Passive exposure: Past    Smokeless tobacco: Never   Substance Use Topics    Alcohol use: No       Family History   Problem Relation Age of Onset    Heart disease Mother     Heart attack Mother     Hypertension Mother     Coronary artery disease Mother     Lung disease Father         Health Maintenance Due   Topic Date Due    ZOSTER VACCINE (1 of 2) Never done    RSV Vaccine - Adults (1 - 1-dose 75+ series) Never done    ANNUAL WELLNESS VISIT  08/30/2025        Current Outpatient Medications on File Prior to Visit   Medication Sig    amLODIPine (NORVASC) 5 MG tablet Take 1 tablet by mouth Daily.    atenolol (TENORMIN) 25 MG tablet 1 tablet PO QAM and 1/4 tab PO QHS    Calcium Carb-Cholecalciferol (CALCIUM 600 + D PO) Take  by mouth As Needed.    escitalopram (LEXAPRO) 10 MG tablet Take 0.5 tablets by mouth Daily.    fluorometholone (FML) 0.1 % ophthalmic suspension Administer 1 drop to both eyes Daily.    ibuprofen (ADVIL,MOTRIN) 200 MG tablet Take 1 tablet by mouth Every 6 (Six) Hours As Needed for Mild Pain.    LORazepam (ATIVAN) 1 MG tablet TAKE 1/2 TO 1 (ONE-HALF TO ONE) TABLET BY MOUTH AT BEDTIME AS NEEDED FOR SLEEP    melatonin 5 MG tablet tablet Take 1 tablet by mouth At Night As Needed.    Misc Natural Products (CORTISOL MANAGER PO) Take  by mouth Every Night.    Multiple Vitamins-Minerals (PRESERVISION AREDS PO) Take 1 tablet by mouth Daily.    pilocarpine (PILOCAR) 1 % ophthalmic solution Administer 1 drop into the left eye 2 (Two) Times a Day.     No current facility-administered medications on file prior to visit.       Immunization History   Administered Date(s) Administered     "COVID-19 (ANGEL) 05/07/2021    Td (TDVAX) 01/01/2019       Review of Systems   Constitutional:  Negative for fatigue and fever.   Respiratory:  Negative for cough and shortness of breath.    Cardiovascular:  Negative for chest pain, palpitations and leg swelling.   Neurological:  Positive for dizziness (this am).        Objective     Vitals:    07/10/25 1112 07/10/25 1132   BP: 151/63 138/80  Comment: manual recheck   BP Location: Right arm Left arm   Patient Position: Sitting Sitting   Cuff Size: Adult Adult   Pulse: 58 60   Temp: 97.8 °F (36.6 °C)    TempSrc: Oral    SpO2: 99%    Weight: 95.3 kg (210 lb)    Height: 160 cm (62.99\")             Physical Exam  Vitals reviewed.   Constitutional:       General: She is not in acute distress.     Appearance: Normal appearance.   Cardiovascular:      Rate and Rhythm: Normal rate and regular rhythm.      Heart sounds: Normal heart sounds.   Pulmonary:      Effort: Pulmonary effort is normal. No respiratory distress.      Breath sounds: Normal breath sounds.   Skin:     Findings: Rash (to bilateral hands and wrist, with some mild erythema, maculpapular areas, no vessicles noted) present.   Neurological:      Mental Status: She is alert.   Psychiatric:         Mood and Affect: Mood normal.         Behavior: Behavior normal.         Result Review :     The following data was reviewed by: DIEGO Blair on 07/10/2025:                       Assessment and Plan      Assessment & Plan  Rash  To avoid exposure, to continue benadryl prn, cover her with a steroid pack, if not improving let us know   Orders:    methylPREDNISolone (MEDROL) 4 MG dose pack; Take as directed on package instructions, with food.    Essential (primary) hypertension  Repeat BP improved                              Follow Up     Return if symptoms worsen or fail to improve.    Patient was given instructions and counseling regarding her condition or for health maintenance advice. Please see " specific information pulled into the AVS if appropriate.

## 2025-07-10 NOTE — ASSESSMENT & PLAN NOTE
To avoid exposure, to continue benadryl prn, cover her with a steroid pack, if not improving let us know   Orders:    methylPREDNISolone (MEDROL) 4 MG dose pack; Take as directed on package instructions, with food.

## 2025-07-29 DIAGNOSIS — F41.1 GENERALIZED ANXIETY DISORDER: ICD-10-CM

## 2025-07-30 RX ORDER — LORAZEPAM 1 MG/1
TABLET ORAL
Qty: 30 TABLET | Refills: 0 | Status: SHIPPED | OUTPATIENT
Start: 2025-07-30